# Patient Record
Sex: FEMALE | Race: WHITE | NOT HISPANIC OR LATINO | Employment: OTHER | URBAN - METROPOLITAN AREA
[De-identification: names, ages, dates, MRNs, and addresses within clinical notes are randomized per-mention and may not be internally consistent; named-entity substitution may affect disease eponyms.]

---

## 2022-05-20 LAB — HBA1C MFR BLD HPLC: 6.2 %

## 2022-08-26 ENCOUNTER — OFFICE VISIT (OUTPATIENT)
Dept: INTERNAL MEDICINE CLINIC | Facility: CLINIC | Age: 67
End: 2022-08-26
Payer: MEDICARE

## 2022-08-26 VITALS
TEMPERATURE: 97.6 F | BODY MASS INDEX: 51.7 KG/M2 | RESPIRATION RATE: 18 BRPM | OXYGEN SATURATION: 98 % | WEIGHT: 291.8 LBS | SYSTOLIC BLOOD PRESSURE: 138 MMHG | HEART RATE: 66 BPM | DIASTOLIC BLOOD PRESSURE: 78 MMHG | HEIGHT: 63 IN

## 2022-08-26 DIAGNOSIS — N39.3 STRESS INCONTINENCE: ICD-10-CM

## 2022-08-26 DIAGNOSIS — Z00.00 ENCOUNTER FOR SUBSEQUENT ANNUAL WELLNESS VISIT (AWV) IN MEDICARE PATIENT: Primary | ICD-10-CM

## 2022-08-26 DIAGNOSIS — E11.42 TYPE 2 DIABETES MELLITUS WITH DIABETIC POLYNEUROPATHY, WITH LONG-TERM CURRENT USE OF INSULIN (HCC): ICD-10-CM

## 2022-08-26 DIAGNOSIS — E78.2 MIXED HYPERLIPIDEMIA: ICD-10-CM

## 2022-08-26 DIAGNOSIS — I10 PRIMARY HYPERTENSION: ICD-10-CM

## 2022-08-26 DIAGNOSIS — Z12.31 ENCOUNTER FOR SCREENING MAMMOGRAM FOR BREAST CANCER: ICD-10-CM

## 2022-08-26 DIAGNOSIS — E66.01 OBESITY, MORBID (HCC): ICD-10-CM

## 2022-08-26 DIAGNOSIS — Z79.4 TYPE 2 DIABETES MELLITUS WITH DIABETIC POLYNEUROPATHY, WITH LONG-TERM CURRENT USE OF INSULIN (HCC): ICD-10-CM

## 2022-08-26 DIAGNOSIS — Z11.59 NEED FOR HEPATITIS C SCREENING TEST: ICD-10-CM

## 2022-08-26 PROCEDURE — 99204 OFFICE O/P NEW MOD 45 MIN: CPT | Performed by: FAMILY MEDICINE

## 2022-08-26 PROCEDURE — G0438 PPPS, INITIAL VISIT: HCPCS | Performed by: FAMILY MEDICINE

## 2022-08-26 RX ORDER — GABAPENTIN 600 MG/1
600 TABLET ORAL 3 TIMES DAILY
COMMUNITY

## 2022-08-26 RX ORDER — GLIMEPIRIDE 4 MG/1
4 TABLET ORAL
COMMUNITY
End: 2022-09-23

## 2022-08-26 RX ORDER — DULOXETIN HYDROCHLORIDE 60 MG/1
60 CAPSULE, DELAYED RELEASE ORAL
COMMUNITY

## 2022-08-26 RX ORDER — HYDROCODONE BITARTRATE AND ACETAMINOPHEN 7.5; 325 MG/1; MG/1
1 TABLET ORAL EVERY 6 HOURS PRN
COMMUNITY
End: 2022-08-26

## 2022-08-26 RX ORDER — FEXOFENADINE HCL 180 MG/1
180 TABLET ORAL
COMMUNITY

## 2022-08-26 RX ORDER — HYDROCHLOROTHIAZIDE 25 MG/1
25 TABLET ORAL
COMMUNITY

## 2022-08-26 RX ORDER — FUROSEMIDE 40 MG/1
40 TABLET ORAL DAILY
COMMUNITY

## 2022-08-26 RX ORDER — ATORVASTATIN CALCIUM 40 MG/1
40 TABLET, FILM COATED ORAL DAILY
COMMUNITY

## 2022-08-26 NOTE — PROGRESS NOTES
Assessment and Plan:     Problem List Items Addressed This Visit        Other    Obesity, morbid ([address] )      Other Visit Diagnoses     Encounter for subsequent annual wellness visit (AWV) in Medicare patient    -  Primary    Need for hepatitis C screening test        Relevant Orders    Hepatitis C Antibody ([facility]) (Completed)    Encounter for immunization        Encounter for screening mammogram for breast cancer        Relevant Orders    Mammo screening bilateral w 3d & cad    Type 2 diabetes mellitus with diabetic polyneuropathy, with long-term current use of insulin (HCC)        Relevant Medications    glimepiride (AMARYL) 4 mg tablet    Dulaglutide 1 5 MG/0 5ML SOPN    insulin glargine (TOUJEO MAX) 300 units/mL CONCENTRATED U-300 injection pen (2-unit dial)    Other Relevant Orders    HEMOGLOBIN A1C W/ EAG ESTIMATION (Completed)    Mixed hyperlipidemia        Relevant Medications    atorvastatin (LIPITOR) 40 mg tablet    Primary hypertension        Relevant Medications    furosemide (LASIX) 40 mg tablet    hydrochlorothiazide (HYDRODIURIL) 25 mg tablet    Stress incontinence            BMI Counseling: Body mass index is 51 69 kg/m²  The BMI is above normal  Nutrition recommendations include encouraging healthy choices of fruits and vegetables, reducing intake of saturated and trans fat and reducing intake of cholesterol  Exercise recommendations include exercising 3-5 times per week  Rationale for BMI follow-up plan is due to patient being overweight or obese  Depression Screening and Follow-up Plan: Patient was screened for depression during today's encounter  They screened negative with a PHQ-2 score of 1  Urinary Incontinence Plan of Care: counseling topics discussed: use restroom every 2 hours, limit alcohol, caffeine, spicy foods, and acidic foods and limiting fluid intake 3-4 hours before bed         Preventive health issues were discussed with patient, and age appropriate screening tests were ordered as noted in patient's After Visit Summary  Personalized health advice and appropriate referrals for health education or preventive services given if needed, as noted in patient's After Visit Summary  History of Present Illness:     Patient presents for a Medicare Wellness Visit       Patient Care Team:  Jorje Montague DO as PCP - General (Family Medicine)     Review of Systems:          Problem List:     Patient Active Problem List   Diagnosis    Obesity, morbid [facility])      Past Medical and Surgical History:     Past Medical History:   Diagnosis Date    Cancer [facility])      Past Surgical History:   Procedure Laterality Date    GALLBLADDER SURGERY      HYSTERECTOMY      UMBILICAL HERNIA REPAIR        Family History:     History reviewed  No pertinent family history     Social History:     Social History     Socioeconomic History    Marital status: /Civil Union     Spouse name: None    Number of children: None    Years of education: None    Highest education level: None   Occupational History    None   Tobacco Use    Smoking status: Former Smoker     Types: Cigarettes     Quit date: 2000     Years since quittin 7    Smokeless tobacco: Former User   Vaping Use    Vaping Use: Never used   Substance and Sexual Activity    Alcohol use: None    Drug use: Yes     Types: Marijuana     Comment: every day medical marijuana    Sexual activity: None   Other Topics Concern    None   Social History Narrative    None     Social Determinants of Health     Financial Resource Strain: Not on file   Food Insecurity: Not on file   Transportation Needs: Not on file   Physical Activity: Not on file   Stress: Not on file   Social Connections: Not on file   Intimate Partner Violence: Not on file   Housing Stability: Not on file      Medications and Allergies:     Current Outpatient Medications   Medication Sig Dispense Refill    atorvastatin (LIPITOR) 40 mg tablet Take 40 mg by mouth daily Once a day      DULoxetine (CYMBALTA) 60 mg delayed release capsule Take 60 mg by mouth      fexofenadine (ALLEGRA) 180 MG tablet Take 180 mg by mouth      furosemide (LASIX) 40 mg tablet Take 40 mg by mouth daily      gabapentin (NEURONTIN) 600 MG tablet Take 600 mg by mouth Three times a day      glimepiride (AMARYL) 4 mg tablet Take 4 mg by mouth      hydrochlorothiazide (HYDRODIURIL) 25 mg tablet Take 25 mg by mouth      insulin glargine (TOUJEO MAX) 300 units/mL CONCENTRATED U-300 injection pen (2-unit dial) Inject under the skin      Dulaglutide 1 5 MG/0 5ML SOPN Inject 1 5 mg under the skin Once a week (Patient not taking: Reported on 8/26/2022)       No current facility-administered medications for this visit  Allergies   Allergen Reactions    Penicillins Throat Swelling and Tongue Swelling      Immunizations:     Immunization History   Administered Date(s) Administered    COVID-19 PFIZER VACCINE 0 3 ML IM 04/19/2021, 05/14/2021      Health Maintenance:         Topic Date Due    Breast Cancer Screening: Mammogram  Never done    Colorectal Cancer Screening  Never done    Hepatitis C Screening  Completed         Topic Date Due    Pneumococcal Vaccine: 65+ Years (1 - PCV) Never done    COVID-19 Vaccine (3 - Booster for Pfizer series) 10/14/2021    Influenza Vaccine (1) 09/01/2022      Medicare Screening Tests and Risk Assessments:         Health Risk Assessment:   Patient rates overall health as fair  Patient feels that their physical health rating is slightly better  Patient is satisfied with their life  Eyesight was rated as same  Hearing was rated as slightly worse  Patient feels that their emotional and mental health rating is same  Patients states they are sometimes angry  Patient states they are always unusually tired/fatigued  Pain experienced in the last 7 days has been some  Patient's pain rating has been 8/10   Patient states that she has experienced weight loss or gain in last 6 months  Lost weight    Fall Risk Screening: In the past year, patient has experienced: no history of falling in past year      Urinary Incontinence Screening:   Patient has leaked urine accidently in the last six months  Home Safety:  Patient does not have trouble with stairs inside or outside of their home  Patient has working smoke alarms and has working carbon monoxide detector  Home safety hazards include: none  Nutrition:   Current diet is Unhealthy  Medications:   Patient is not currently taking any over-the-counter supplements  Patient is able to manage medications  Activities of Daily Living (ADLs)/Instrumental Activities of Daily Living (IADLs):   Walk and transfer into and out of bed and chair?: Yes  Dress and groom yourself?: Yes    Bathe or shower yourself?: Yes    Feed yourself? Yes  Do your laundry/housekeeping?: Yes  Manage your money, pay your bills and track your expenses?: Yes  Make your own meals?: Yes    Do your own shopping?: No    Previous Hospitalizations:   Any hospitalizations or ED visits within the last 12 months?: No      Advance Care Planning:   Living will: No      PREVENTIVE SCREENINGS        Colorectal Cancer Screening:     General: Screening Current      Cervical Cancer Screening:    General: Screening Not Indicated      Lung Cancer Screening:     General: Screening Not Indicated    Screening, Brief Intervention, and Referral to Treatment (SBIRT)    Screening  Typical number of drinks in a day: 0  Typical number of drinks in a week: 0  Interpretation: Low risk drinking behavior      Review of Current Opioid Use    Opioid Risk Tool (ORT) Interpretation: Complete Opioid Risk Tool (ORT)    No exam data present     Physical Exam:     /78 (BP Location: Left arm, Patient Position: Sitting, Cuff Size: Large)   Pulse 66   Temp 97 6 °F (36 4 °C) (Temporal)   Resp 18   Ht 5' 3" (1 6 m)   Wt 132 kg (291 lb 12 8 oz)   SpO2 98%   BMI 51 69 kg/m²          Waldo Jordan Lan Ocampo DO

## 2022-08-26 NOTE — PROGRESS NOTES
INITIAL OFFICE VISIT  [facility] - [facility] OF [address] [place] [address]    NAME: Shannon Styles  AGE: 78 y o  SEX: female  : 1955     DATE: 2022     Assessment and Plan:     Problem List Items Addressed This Visit        Other    Obesity, morbid ([address] )      Other Visit Diagnoses     Encounter for subsequent annual wellness visit (AWV) in Medicare patient    -  Primary    Need for hepatitis C screening test        Relevant Orders    Hepatitis C Antibody ([facility]) (Completed)    Encounter for screening mammogram for breast cancer        Relevant Orders    Mammo screening bilateral w 3d & cad    Type 2 diabetes mellitus with diabetic polyneuropathy, with long-term current use of insulin (HCC)        Relevant Medications    glimepiride (AMARYL) 4 mg tablet    Dulaglutide 1 5 MG/0 5ML SOPN    insulin glargine (TOUJEO MAX) 300 units/mL CONCENTRATED U-300 injection pen (2-unit dial)    Other Relevant Orders    HEMOGLOBIN A1C W/ EAG ESTIMATION (Completed)    Mixed hyperlipidemia        Relevant Medications    atorvastatin (LIPITOR) 40 mg tablet    Primary hypertension        Relevant Medications    furosemide (LASIX) 40 mg tablet    hydrochlorothiazide (HYDRODIURIL) 25 mg tablet    Stress incontinence            Refills provided  BP seems well controlled on current antihypertensive regimen  Will obtain updated laboratory studies of titrate additional therapies as needed  Return in about 4 weeks (around 2022) for Recheck  Chief Complaint:     Chief Complaint   Patient presents with    Establish Care     Pt states that she is here to start care and have other concerns      History of Present Illness:     Medical hx for dm2, htn, hld, drepressio h/o of endomedetirial cancer and chronic nerve pain  Has a medical marijuana licmien  usig oral thc  Also take advil, tyneol and gabapening  For diabetes trucliicyt, [address]  and glimpiride   No prandial inusline   Didn't tolrate metformin  Last colon cancer screening- colonoscpy 6 years + poloyps  Last mammo- never had one    Denies hx of MI or CVA   Quite toboacco   etoh once a year   Family hx    Wears glasses  Last visit was two years          The following portions of the patient's history were reviewed and updated as appropriate: allergies, current medications, past family history, past medical history, past social history, past surgical history and problem list      Review of Systems:     Review of Systems   Eyes: Positive for visual disturbance (glasses)  Respiratory: Negative for shortness of breath  Cardiovascular: Negative for chest pain  Gastrointestinal: Negative for constipation and diarrhea  Genitourinary: Positive for frequency  Musculoskeletal: Positive for arthralgias, back pain and gait problem  Neurological: Positive for light-headedness  Negative for headaches          Past Medical History:     Past Medical History:   Diagnosis Date    Cancer [facility])         Past Surgical History:     Past Surgical History:   Procedure Laterality Date    GALLBLADDER SURGERY      HYSTERECTOMY      UMBILICAL HERNIA REPAIR          Social History:     Social History     Socioeconomic History    Marital status: /Civil Union     Spouse name: None    Number of children: None    Years of education: None    Highest education level: None   Occupational History    None   Tobacco Use    Smoking status: Former Smoker     Types: Cigarettes     Quit date: 2000     Years since quittin 7    Smokeless tobacco: Former User   Vaping Use    Vaping Use: Never used   Substance and Sexual Activity    Alcohol use: None    Drug use: Yes     Types: Marijuana     Comment: every day medical marijuana    Sexual activity: None   Other Topics Concern    None   Social History Narrative    None     Social Determinants of Health     Financial Resource Strain: Not on file   Food Insecurity: Not on file   Transportation Needs: Not on file   Physical Activity: Not on file   Stress: Not on file   Social Connections: Not on file   Intimate Partner Violence: Not on file   Housing Stability: Not on file         Family History:   History reviewed  No pertinent family history  Current Medications:     Current Outpatient Medications:     atorvastatin (LIPITOR) 40 mg tablet, Take 40 mg by mouth daily Once a day, Disp: , Rfl:     DULoxetine (CYMBALTA) 60 mg delayed release capsule, Take 60 mg by mouth, Disp: , Rfl:     fexofenadine (ALLEGRA) 180 MG tablet, Take 180 mg by mouth, Disp: , Rfl:     furosemide (LASIX) 40 mg tablet, Take 40 mg by mouth daily, Disp: , Rfl:     gabapentin (NEURONTIN) 600 MG tablet, Take 600 mg by mouth Three times a day, Disp: , Rfl:     glimepiride (AMARYL) 4 mg tablet, Take 4 mg by mouth, Disp: , Rfl:     hydrochlorothiazide (HYDRODIURIL) 25 mg tablet, Take 25 mg by mouth, Disp: , Rfl:     insulin glargine (TOUJEO MAX) 300 units/mL CONCENTRATED U-300 injection pen (2-unit dial), Inject under the skin, Disp: , Rfl:     Dulaglutide 1 5 MG/0 5ML SOPN, Inject 1 5 mg under the skin Once a week (Patient not taking: Reported on 8/26/2022), Disp: , Rfl:      Allergies: Allergies   Allergen Reactions    Penicillins Throat Swelling and Tongue Swelling        Physical Exam:     /78 (BP Location: Left arm, Patient Position: Sitting, Cuff Size: Large)   Pulse 66   Temp 97 6 °F (36 4 °C) (Temporal)   Resp 18   Ht 5' 3" (1 6 m)   Wt 132 kg (291 lb 12 8 oz)   SpO2 98%   BMI 51 69 kg/m²     Physical Exam  Constitutional:       Appearance: She is obese  HENT:      Head: Normocephalic and atraumatic  Right Ear: External ear normal       Left Ear: External ear normal    Eyes:      Extraocular Movements: Extraocular movements intact  Conjunctiva/sclera: Conjunctivae normal       Pupils: Pupils are equal, round, and reactive to light     Cardiovascular:      Rate and Rhythm: Normal rate and regular rhythm  Heart sounds: No murmur heard  Pulmonary:      Effort: Pulmonary effort is normal       Breath sounds: Normal breath sounds  Abdominal:      General: Bowel sounds are normal       Palpations: Abdomen is soft  Musculoskeletal:      Right lower leg: Edema (trace) present  Left lower leg: Edema (trace) present  Neurological:      Mental Status: She is alert and oriented to person, place, and time     Psychiatric:         Mood and Affect: Mood normal          Behavior: Behavior normal               Antonio Carreon,   MEDICAL [address]

## 2022-08-26 NOTE — PATIENT INSTRUCTIONS
Urinary Incontinence   AMBULATORY CARE:   Urinary incontinence (UI)  is when you lose control of your bladder  UI develops because your bladder cannot store or empty urine properly  The 3 most common types of UI are stress incontinence, urge incontinence, or both  Common symptoms include the following:   · You feel like your bladder does not empty completely when you urinate  · You urinate often and need to urinate immediately  · You leak urine when you sleep, or you wake up with the urge to urinate  · You leak urine when you cough, sneeze, exercise, or laugh  Call your doctor if:   · You have severe pain  · You are confused or cannot think clearly  · You have a fever  · You see blood in your urine  · You have pain when you urinate  · You have new or worse pain, even after treatment  · Your mouth feels dry or you have vision changes  · Your urine is cloudy or smells bad  · You have questions or concerns about your condition or care  Medicines:   · Medicines  may be given to help strengthen your bladder control  · Take your medicine as directed  Contact your healthcare provider if you think your medicine is not helping or if you have side effects  Tell him or her if you are allergic to any medicine  Keep a list of the medicines, vitamins, and herbs you take  Include the amounts, and when and why you take them  Bring the list or the pill bottles to follow-up visits  Carry your medicine list with you in case of an emergency  Do pelvic muscle exercises often:  Your pelvic muscles help you stop urinating  Squeeze these muscles tight for 5 seconds, then relax for 5 seconds  Gradually work up to squeezing for 10 seconds  Do 3 sets of 15 repetitions a day, or as directed  This will help strengthen your pelvic muscles and improve bladder control  Train your bladder:  Go to the bathroom at set times, such as every 2 hours, even if you do not feel the urge to go   You can also try to hold your urine when you feel the urge to go  For example, hold your urine for 5 minutes when you feel the urge to go  As that becomes easier, hold your urine for 10 minutes  Self-care:   · Keep a UI record  Write down how often you leak urine and how much you leak  Make a note of what you were doing when you leaked urine  · Drink liquids as directed  Ask your healthcare provider how much liquid to drink each day and which liquids are best for you  You may need to limit the amount of liquid you drink to help control your urine leakage  Do not drink any liquid right before you go to bed  Limit or do not have drinks that contain caffeine or alcohol  · Prevent constipation  Eat a variety of high-fiber foods  Good examples are high-fiber cereals, beans, vegetables, and whole-grain breads  Prune juice may help make your bowel movement softer  Walking is the best way to trigger your intestines to have a bowel movement  · Exercise regularly and maintain a healthy weight  Ask your healthcare provider how much you should weigh and about the best exercise plan for you  Weight loss and exercise will decrease pressure on your bladder and help you control your leakage  Ask him or her to help you create a weight loss plan if you are overweight  · Use a catheter as directed  to help empty your bladder  A catheter is a tiny, plastic tube that is put into your bladder to drain your urine  Your healthcare provider may tell you to use a catheter to prevent your bladder from getting too full and leaking urine  · Go to behavior therapy as directed  Behavior therapy may be used to help you learn to control your urge to urinate  Follow up with your doctor as directed:  Write down your questions so you remember to ask them during your visits  © Copyright [employer] 2022 Information is for End User's use only and may not be sold, redistributed or otherwise used for commercial purposes   All illustrations and images included in CareNotes® are the copyrighted property of A D A M , Inc  or [employer] Garcia  The above information is an  only  It is not intended as medical advice for individual conditions or treatments  Talk to your doctor, nurse or pharmacist before following any medical regimen to see if it is safe and effective for you

## 2022-08-27 ENCOUNTER — APPOINTMENT (OUTPATIENT)
Dept: LAB | Facility: HOSPITAL | Age: 67
End: 2022-08-27
Payer: MEDICARE

## 2022-08-27 DIAGNOSIS — E11.42 TYPE 2 DIABETES MELLITUS WITH DIABETIC POLYNEUROPATHY, WITH LONG-TERM CURRENT USE OF INSULIN (HCC): ICD-10-CM

## 2022-08-27 DIAGNOSIS — Z79.4 TYPE 2 DIABETES MELLITUS WITH DIABETIC POLYNEUROPATHY, WITH LONG-TERM CURRENT USE OF INSULIN (HCC): ICD-10-CM

## 2022-08-27 DIAGNOSIS — Z11.59 NEED FOR HEPATITIS C SCREENING TEST: ICD-10-CM

## 2022-08-27 PROCEDURE — 86803 HEPATITIS C AB TEST: CPT

## 2022-08-27 PROCEDURE — 83036 HEMOGLOBIN GLYCOSYLATED A1C: CPT

## 2022-08-27 PROCEDURE — 36415 COLL VENOUS BLD VENIPUNCTURE: CPT

## 2022-08-28 LAB
EST. AVERAGE GLUCOSE BLD GHB EST-MCNC: 137 MG/DL
HBA1C MFR BLD: 6.4 %
HCV AB SER QL: NORMAL

## 2022-09-01 ENCOUNTER — TELEPHONE (OUTPATIENT)
Dept: INTERNAL MEDICINE CLINIC | Facility: CLINIC | Age: 67
End: 2022-09-01

## 2022-09-01 NOTE — TELEPHONE ENCOUNTER
Patient has been notified about her labs and A1C       Patient stated that she is also using her Free Style Mark 2 to monitor her sugars and it has helped her a lot and she will see you in a 4 weeks for a F/U

## 2022-09-01 NOTE — TELEPHONE ENCOUNTER
----- Message from Papa Lester DO sent at 9/1/2022  8:21 AM EDT -----  A1c is 6 4 this is in the goal range  Diabetes is well controlled on current medication

## 2022-09-16 ENCOUNTER — RA CDI HCC (OUTPATIENT)
Dept: OTHER | Facility: HOSPITAL | Age: 67
End: 2022-09-16

## 2022-09-16 NOTE — PROGRESS NOTES
[address]  coding opportunities       Chart reviewed, no opportunity found: CHART REVIEWED, NO OPPORTUNITY FOUND        Patients Insurance     Medicare Insurance: Medicare

## 2022-09-23 ENCOUNTER — OFFICE VISIT (OUTPATIENT)
Dept: INTERNAL MEDICINE CLINIC | Facility: CLINIC | Age: 67
End: 2022-09-23
Payer: MEDICARE

## 2022-09-23 VITALS
RESPIRATION RATE: 16 BRPM | WEIGHT: 292.4 LBS | TEMPERATURE: 98.1 F | BODY MASS INDEX: 51.81 KG/M2 | DIASTOLIC BLOOD PRESSURE: 76 MMHG | SYSTOLIC BLOOD PRESSURE: 142 MMHG | OXYGEN SATURATION: 98 % | HEIGHT: 63 IN | HEART RATE: 77 BPM

## 2022-09-23 DIAGNOSIS — Z79.4 TYPE 2 DIABETES MELLITUS WITH DIABETIC POLYNEUROPATHY, WITH LONG-TERM CURRENT USE OF INSULIN (HCC): Primary | ICD-10-CM

## 2022-09-23 DIAGNOSIS — Z23 ENCOUNTER FOR IMMUNIZATION: ICD-10-CM

## 2022-09-23 DIAGNOSIS — E78.2 MIXED HYPERLIPIDEMIA: ICD-10-CM

## 2022-09-23 DIAGNOSIS — Z78.0 POST-MENOPAUSAL: ICD-10-CM

## 2022-09-23 DIAGNOSIS — E11.42 TYPE 2 DIABETES MELLITUS WITH DIABETIC POLYNEUROPATHY, WITH LONG-TERM CURRENT USE OF INSULIN (HCC): Primary | ICD-10-CM

## 2022-09-23 DIAGNOSIS — I10 PRIMARY HYPERTENSION: ICD-10-CM

## 2022-09-23 PROCEDURE — G0008 ADMIN INFLUENZA VIRUS VAC: HCPCS | Performed by: FAMILY MEDICINE

## 2022-09-23 PROCEDURE — 90662 IIV NO PRSV INCREASED AG IM: CPT | Performed by: FAMILY MEDICINE

## 2022-09-23 PROCEDURE — 99214 OFFICE O/P EST MOD 30 MIN: CPT | Performed by: FAMILY MEDICINE

## 2022-09-23 NOTE — PROGRESS NOTES
FOLLOW-UP OFFICE VISIT  [facility]    NAME: Silvana Runner  AGE: 78 y o  SEX: female  : 1955     DATE: 2022     Assessment and Plan:     Problem List Items Addressed This Visit        Endocrine    Type 2 diabetes mellitus with diabetic polyneuropathy, with long-term current use of insulin ([address] ) - Primary    Relevant Medications    Empagliflozin (Jardiance) 10 MG TABS tablet       Cardiovascular and Mediastinum    Primary hypertension       Other    Mixed hyperlipidemia      Other Visit Diagnoses     Encounter for immunization        Relevant Orders    influenza vaccine, high-dose, PF 0 7 mL (FLUZONE HIGH-DOSE) (Completed)    Post-menopausal        Relevant Orders    DXA bone density spine hip and pelvis        A1C of 6  4  will decrease insulin to 70 units nightly  Add SGLT2  D/c sulfonyurea  Continued statin  BP well controlled  Screening studies ordered         Return in about 4 weeks (around 10/21/2022) for Recheck  Chief Complaint:     Chief Complaint   Patient presents with    Follow-up     4 week         History of Present Illness:     Presents for f/u  Discussed a1c and blood sugars  Review of Systems:     Review of Systems   Respiratory: Negative for shortness of breath  Cardiovascular: Negative for chest pain and leg swelling  Musculoskeletal: Negative for gait problem  Problem List:     Patient Active Problem List   Diagnosis    Obesity, morbid ([address] )    Mixed hyperlipidemia    Primary hypertension    Type 2 diabetes mellitus with diabetic polyneuropathy, with long-term current use of insulin (HCC)        Objective:     /76 (BP Location: Right arm, Patient Position: Sitting, Cuff Size: Large)   Pulse 77   Temp 98 1 °F (36 7 °C) (Tympanic)   Resp 16   Ht 5' 3" (1 6 m)   Wt 133 kg (292 lb 6 4 oz)   SpO2 98%   BMI 51 80 kg/m²     Physical Exam  HENT:      Head: Normocephalic     Eyes: Conjunctiva/sclera: Conjunctivae normal    Cardiovascular:      Rate and Rhythm: Normal rate and regular rhythm  Heart sounds: No murmur heard  Pulmonary:      Effort: Pulmonary effort is normal       Breath sounds: Normal breath sounds  Abdominal:      General: Bowel sounds are normal       Palpations: Abdomen is soft  Neurological:      Mental Status: She is alert  Pertinent Laboratory/Diagnostic Studies:    Laboratory Results: I have personally reviewed the pertinent laboratory results/reports     Chemistry Profile: No results for input(s): NA, K, CL, CO2, ANIONGAP, BUN, CREATININE, GLUF, GLUC, GLUCOSE, CALCIUM, CORRECTEDCA, MG, PHOS, AST, ALT, ALKPHOS, PROT, BILITOT, EGFR, GFRAA, GFRNONAA, EGFRAA, EGFRNAA, EGFRNONAFA in the last 8784 hours  Invalid input(s): EXTSODIUM, EXTPOTASSIUM, EXTCO2, EXTANIONGAP, EXTBUN, EXTCREAT, EXTGLUBLD, GLU, SLAMBGLUCOSE, EXTCALCIUM, CAADJUST, ALBUMIN, SERUMALBUMIN, EXTEGFR  Endocrine Studies:   Results from Last 12 Months   Lab Units 08/27/22  1117   HEMOGLOBIN A1C % 6 4*       Radiology/Other Diagnostic Testing Results: I have personally reviewed pertinent reports          Tr Lopez [facility]  9/27/2022 2:04 PM

## 2022-09-23 NOTE — PATIENT INSTRUCTIONS
Decrease insulin to 70 units  Start taking Jardiance 10 mg daily     Stop taking gliperimide    Schedule your bone scan and you mammogram

## 2022-10-13 ENCOUNTER — TELEPHONE (OUTPATIENT)
Dept: INTERNAL MEDICINE CLINIC | Facility: CLINIC | Age: 67
End: 2022-10-13

## 2022-10-13 DIAGNOSIS — E11.42 TYPE 2 DIABETES MELLITUS WITH DIABETIC POLYNEUROPATHY, WITH LONG-TERM CURRENT USE OF INSULIN (HCC): ICD-10-CM

## 2022-10-13 DIAGNOSIS — Z79.4 TYPE 2 DIABETES MELLITUS WITH DIABETIC POLYNEUROPATHY, WITH LONG-TERM CURRENT USE OF INSULIN (HCC): ICD-10-CM

## 2022-10-13 RX ORDER — EMPAGLIFLOZIN 10 MG/1
TABLET, FILM COATED ORAL
Qty: 30 TABLET | Refills: 11 | Status: SHIPPED | OUTPATIENT
Start: 2022-10-13 | End: 2022-10-18

## 2022-10-13 NOTE — TELEPHONE ENCOUNTER
Hold the jardiance for the next 3 days  If symptoms improve then it probably was from the medicaiton and I can switch it to something else

## 2022-10-13 NOTE — TELEPHONE ENCOUNTER
Taking Jardiance for 2 wks- lost 15lbs  Could she be sick from medication?   Didn't take it today  Vomiting, diahrrea, and not eating- can't keep anything down

## 2022-10-17 ENCOUNTER — TELEPHONE (OUTPATIENT)
Dept: INTERNAL MEDICINE CLINIC | Facility: CLINIC | Age: 67
End: 2022-10-17

## 2022-10-17 NOTE — TELEPHONE ENCOUNTER
Feeling better after stopping Jardiance for 3 days    Can Dr Anupam Gibbs order something different for her to try?

## 2022-10-18 DIAGNOSIS — Z79.4 TYPE 2 DIABETES MELLITUS WITH DIABETIC POLYNEUROPATHY, WITH LONG-TERM CURRENT USE OF INSULIN (HCC): Primary | ICD-10-CM

## 2022-10-18 DIAGNOSIS — E11.42 TYPE 2 DIABETES MELLITUS WITH DIABETIC POLYNEUROPATHY, WITH LONG-TERM CURRENT USE OF INSULIN (HCC): Primary | ICD-10-CM

## 2022-10-24 ENCOUNTER — TELEPHONE (OUTPATIENT)
Dept: OTHER | Facility: OTHER | Age: 67
End: 2022-10-24

## 2022-10-24 DIAGNOSIS — E11.42 TYPE 2 DIABETES MELLITUS WITH DIABETIC POLYNEUROPATHY, WITH LONG-TERM CURRENT USE OF INSULIN (HCC): Primary | ICD-10-CM

## 2022-10-24 DIAGNOSIS — Z79.4 TYPE 2 DIABETES MELLITUS WITH DIABETIC POLYNEUROPATHY, WITH LONG-TERM CURRENT USE OF INSULIN (HCC): Primary | ICD-10-CM

## 2022-10-24 NOTE — PROGRESS NOTES
Please notify pt that rybelsus wasn't covered by insurance  alterative antihyperglycemic farxiga has been sent to pharmacy    This is a once a day tablet for blood sugar

## 2022-10-24 NOTE — TELEPHONE ENCOUNTER
Contacted [facility] who states that patient's insurance does not cover Rybelsus at this time  Can someone contact patient in regards to this? Patient see's Dr Alicia Childers and this prescription was sent in on 10/18, as per [facility] they contacted the Dr's office to state that the Rybelsus was not covered by pt's insurance and an alternative needed to be ordered

## 2022-10-28 ENCOUNTER — OFFICE VISIT (OUTPATIENT)
Dept: INTERNAL MEDICINE CLINIC | Facility: CLINIC | Age: 67
End: 2022-10-28

## 2022-10-28 VITALS
TEMPERATURE: 97.2 F | OXYGEN SATURATION: 96 % | BODY MASS INDEX: 50.14 KG/M2 | RESPIRATION RATE: 18 BRPM | HEART RATE: 5 BPM | WEIGHT: 283 LBS | HEIGHT: 63 IN | DIASTOLIC BLOOD PRESSURE: 76 MMHG | SYSTOLIC BLOOD PRESSURE: 124 MMHG

## 2022-10-28 DIAGNOSIS — Z79.4 TYPE 2 DIABETES MELLITUS WITH DIABETIC POLYNEUROPATHY, WITH LONG-TERM CURRENT USE OF INSULIN (HCC): Primary | ICD-10-CM

## 2022-10-28 DIAGNOSIS — E78.2 MIXED HYPERLIPIDEMIA: ICD-10-CM

## 2022-10-28 DIAGNOSIS — I10 PRIMARY HYPERTENSION: ICD-10-CM

## 2022-10-28 DIAGNOSIS — E11.42 TYPE 2 DIABETES MELLITUS WITH DIABETIC POLYNEUROPATHY, WITH LONG-TERM CURRENT USE OF INSULIN (HCC): Primary | ICD-10-CM

## 2022-10-28 LAB
LEFT EYE DIABETIC RETINOPATHY: NORMAL
LEFT EYE IMAGE QUALITY: NORMAL
LEFT EYE MACULAR EDEMA: NORMAL
LEFT EYE OTHER RETINOPATHY: NORMAL
RIGHT EYE DIABETIC RETINOPATHY: NORMAL
RIGHT EYE IMAGE QUALITY: NORMAL
RIGHT EYE MACULAR EDEMA: NORMAL
RIGHT EYE OTHER RETINOPATHY: NORMAL
SEVERITY (EYE EXAM): NORMAL

## 2022-10-28 RX ORDER — DULAGLUTIDE 3 MG/.5ML
3 INJECTION, SOLUTION SUBCUTANEOUS WEEKLY
Qty: 6 ML | Refills: 0 | Status: SHIPPED | OUTPATIENT
Start: 2022-10-28 | End: 2023-01-26

## 2022-10-28 RX ORDER — DULAGLUTIDE 0.75 MG/.5ML
INJECTION, SOLUTION SUBCUTANEOUS
COMMUNITY
Start: 2022-10-21 | End: 2022-10-28

## 2022-10-28 NOTE — PROGRESS NOTES
FOLLOW-UP OFFICE VISIT  [facility] [address] [place] [address]    NAME: Renato Porter  AGE: 78 y o  SEX: female  : 1955     DATE: 10/28/2022     Assessment and Plan:     1  Type 2 diabetes mellitus with diabetic polyneuropathy, with long-term current use of insulin (HCC)  a m  fasting glucose within range  Overall also decrease insulin use  Patient encouraged to start [address]  Decrease Toujeo 65 units nightly  Will increase Trulicity to 3 mg Q weekly  Continues to have difficulty changing dietary choices that are beneficial for the management of this issue   - IRIS Diabetic eye exam  - Microalbumin / creatinine urine ratio ([facility]); Future  - HEMOGLOBIN A1C W/ EAG ESTIMATION; Future  - Dulaglutide (Trulicity) 3 UE/5 3PV SOPN; Inject 0 5 mL (3 mg total) under the skin once a week  Dispense: 6 mL; Refill: 0    2  Mixed hyperlipidemia-Continue atorvastatin 49mg qd  3  Primary hypertension  HTN controlled  Continue hctz 25mg qd  Return in about 3 months (around 2023) for Next scheduled follow up  Chief Complaint:     Chief Complaint   Patient presents with   • Follow-up     4 weeks        History of Present Illness:     Presents for diabetes follow-up  Currently taking 1 5 Q weekly Trulicity and 70 units of Toujeo  Has not yet received the [address]  Didn't tolerate the Jardiance  A m  fasting glucose between 85 and mid 120s  Taking statin without issue  Review of Systems:     Review of Systems   Constitutional: Negative for fever  Respiratory: Negative for shortness of breath  Cardiovascular: Negative for chest pain and leg swelling  Gastrointestinal: Positive for nausea and vomiting          Issue has resolved since stopping Jardiance        Problem List:     Patient Active Problem List   Diagnosis   • Obesity, morbid ([address] )   • Mixed hyperlipidemia   • Primary hypertension   • Type 2 diabetes mellitus with diabetic polyneuropathy, with long-term current use of insulin (HCC)        Objective:     /76 (BP Location: Left arm, Patient Position: Sitting, Cuff Size: Large)   Pulse (!) 5   Temp (!) 97 2 °F (36 2 °C) (Temporal)   Resp 18   Ht 5' 3" (1 6 m)   Wt 128 kg (283 lb) Comment: WITH SHOES ON  SpO2 96%   BMI 50 13 kg/m²     Physical Exam  Constitutional:       Appearance: She is obese  HENT:      Head: Normocephalic  Eyes:      Conjunctiva/sclera: Conjunctivae normal    Cardiovascular:      Rate and Rhythm: Normal rate and regular rhythm  Heart sounds: No murmur heard  Pulmonary:      Effort: Pulmonary effort is normal       Breath sounds: Normal breath sounds  Abdominal:      General: Bowel sounds are normal       Palpations: Abdomen is soft  Neurological:      Mental Status: She is alert           Pertinent Laboratory/Diagnostic Studies:    Laboratory Results: I have personally reviewed the pertinent laboratory results/reports           Lucina DUTTON: [facility]  10/28/2022 10:24 AM

## 2022-10-31 ENCOUNTER — TELEPHONE (OUTPATIENT)
Dept: INTERNAL MEDICINE CLINIC | Facility: CLINIC | Age: 67
End: 2022-10-31

## 2022-10-31 NOTE — TELEPHONE ENCOUNTER
AttnDoreene Lenka Pt was seen 10/28 dropped off paperwork to be faxed to [facility]  Pt can't get it through the pharmacy [facility] only  This needs to be done by Tues   11/1/2022  Please call pt back [phone]

## 2022-11-02 ENCOUNTER — TELEPHONE (OUTPATIENT)
Dept: INTERNAL MEDICINE CLINIC | Facility: CLINIC | Age: 67
End: 2022-11-02

## 2022-11-11 ENCOUNTER — HOSPITAL ENCOUNTER (OUTPATIENT)
Dept: BONE DENSITY | Facility: CLINIC | Age: 67
Discharge: HOME | End: 2022-11-11

## 2022-11-11 VITALS — WEIGHT: 283 LBS | HEIGHT: 62 IN | BODY MASS INDEX: 52.08 KG/M2

## 2022-11-11 DIAGNOSIS — Z78.0 POST-MENOPAUSAL: ICD-10-CM

## 2022-11-14 ENCOUNTER — TELEPHONE (OUTPATIENT)
Dept: INTERNAL MEDICINE CLINIC | Facility: CLINIC | Age: 67
End: 2022-11-14

## 2022-11-16 DIAGNOSIS — R35.0 URINARY FREQUENCY: Primary | ICD-10-CM

## 2022-11-18 ENCOUNTER — APPOINTMENT (OUTPATIENT)
Dept: LAB | Facility: CLINIC | Age: 67
End: 2022-11-18

## 2022-11-18 DIAGNOSIS — Z79.4 TYPE 2 DIABETES MELLITUS WITH DIABETIC POLYNEUROPATHY, WITH LONG-TERM CURRENT USE OF INSULIN (HCC): ICD-10-CM

## 2022-11-18 DIAGNOSIS — R35.0 URINARY FREQUENCY: ICD-10-CM

## 2022-11-18 DIAGNOSIS — E11.42 TYPE 2 DIABETES MELLITUS WITH DIABETIC POLYNEUROPATHY, WITH LONG-TERM CURRENT USE OF INSULIN (HCC): ICD-10-CM

## 2022-11-19 LAB
EST. AVERAGE GLUCOSE BLD GHB EST-MCNC: 137 MG/DL
HBA1C MFR BLD: 6.4 %

## 2022-11-21 ENCOUNTER — APPOINTMENT (OUTPATIENT)
Dept: LAB | Facility: CLINIC | Age: 67
End: 2022-11-21

## 2022-11-21 ENCOUNTER — TELEPHONE (OUTPATIENT)
Dept: INTERNAL MEDICINE CLINIC | Facility: CLINIC | Age: 67
End: 2022-11-21

## 2022-11-21 DIAGNOSIS — Z79.4 TYPE 2 DIABETES MELLITUS WITH DIABETIC POLYNEUROPATHY, WITH LONG-TERM CURRENT USE OF INSULIN (HCC): Primary | ICD-10-CM

## 2022-11-21 DIAGNOSIS — E11.42 TYPE 2 DIABETES MELLITUS WITH DIABETIC POLYNEUROPATHY, WITH LONG-TERM CURRENT USE OF INSULIN (HCC): Primary | ICD-10-CM

## 2022-11-21 LAB
BACTERIA UR QL AUTO: ABNORMAL /HPF
BILIRUB UR QL STRIP: NEGATIVE
CLARITY UR: ABNORMAL
COLOR UR: YELLOW
GLUCOSE UR STRIP-MCNC: NEGATIVE MG/DL
HGB UR QL STRIP.AUTO: NEGATIVE
KETONES UR STRIP-MCNC: NEGATIVE MG/DL
LEUKOCYTE ESTERASE UR QL STRIP: NEGATIVE
MUCOUS THREADS UR QL AUTO: ABNORMAL
NITRITE UR QL STRIP: NEGATIVE
NON-SQ EPI CELLS URNS QL MICRO: ABNORMAL /HPF
PH UR STRIP.AUTO: 6 [PH]
PROT UR STRIP-MCNC: ABNORMAL MG/DL
RBC #/AREA URNS AUTO: ABNORMAL /HPF
SP GR UR STRIP.AUTO: 1.02 (ref 1–1.03)
UROBILINOGEN UR STRIP-ACNC: <2 MG/DL
WBC #/AREA URNS AUTO: ABNORMAL /HPF

## 2022-11-22 ENCOUNTER — TELEPHONE (OUTPATIENT)
Dept: INTERNAL MEDICINE CLINIC | Facility: CLINIC | Age: 67
End: 2022-11-22

## 2022-11-22 LAB
CREAT UR-MCNC: 238 MG/DL
MICROALBUMIN UR-MCNC: 24.9 MG/L (ref 0–20)
MICROALBUMIN/CREAT 24H UR: 10 MG/G CREATININE (ref 0–30)

## 2022-11-22 RX ORDER — GLIPIZIDE 5 MG/1
5 TABLET, FILM COATED, EXTENDED RELEASE ORAL DAILY
Qty: 30 TABLET | Refills: 2 | Status: SHIPPED | OUTPATIENT
Start: 2022-11-22

## 2022-11-22 NOTE — TELEPHONE ENCOUNTER
----- Message from Tavo Green DO sent at 11/22/2022  9:44 AM EST -----  The urine doesn't have bacteria in it to suggest infection  However it is very concentrated  She is to increase her water intake and take over the counter AZO for discomfort

## 2023-01-01 ENCOUNTER — HOSPITAL ENCOUNTER (EMERGENCY)
Facility: HOSPITAL | Age: 68
Discharge: HOME | End: 2023-01-02
Attending: EMERGENCY MEDICINE

## 2023-01-01 VITALS
OXYGEN SATURATION: 99 % | TEMPERATURE: 99.7 F | SYSTOLIC BLOOD PRESSURE: 134 MMHG | DIASTOLIC BLOOD PRESSURE: 75 MMHG | HEART RATE: 72 BPM | RESPIRATION RATE: 16 BRPM

## 2023-01-01 DIAGNOSIS — E87.6 HYPOKALEMIA: ICD-10-CM

## 2023-01-01 DIAGNOSIS — R11.2 NAUSEA & VOMITING: ICD-10-CM

## 2023-01-01 DIAGNOSIS — J11.1 INFLUENZA: Primary | ICD-10-CM

## 2023-01-01 LAB
ALBUMIN SERPL BCP-MCNC: 3.6 G/DL (ref 3.5–5)
ALP SERPL-CCNC: 86 U/L (ref 46–116)
ALT SERPL W P-5'-P-CCNC: 24 U/L (ref 12–78)
ANION GAP SERPL CALCULATED.3IONS-SCNC: 10 MMOL/L (ref 4–13)
AST SERPL W P-5'-P-CCNC: 23 U/L (ref 5–45)
BASOPHILS # BLD AUTO: 0.04 THOUSANDS/ÂΜL (ref 0–0.1)
BASOPHILS NFR BLD AUTO: 1 % (ref 0–1)
BILIRUB SERPL-MCNC: 0.57 MG/DL (ref 0.2–1)
BUN SERPL-MCNC: 15 MG/DL (ref 5–25)
CALCIUM SERPL-MCNC: 9.3 MG/DL (ref 8.3–10.1)
CHLORIDE SERPL-SCNC: 100 MMOL/L (ref 96–108)
CO2 SERPL-SCNC: 30 MMOL/L (ref 21–32)
CREAT SERPL-MCNC: 0.79 MG/DL (ref 0.6–1.3)
EOSINOPHIL # BLD AUTO: 0.05 THOUSAND/ÂΜL (ref 0–0.61)
EOSINOPHIL NFR BLD AUTO: 1 % (ref 0–6)
ERYTHROCYTE [DISTWIDTH] IN BLOOD BY AUTOMATED COUNT: 14.7 % (ref 11.6–15.1)
FLUAV RNA RESP QL NAA+PROBE: POSITIVE
FLUBV RNA RESP QL NAA+PROBE: NEGATIVE
GFR SERPL CREATININE-BSD FRML MDRD: 78 ML/MIN/1.73SQ M
GLUCOSE SERPL-MCNC: 140 MG/DL (ref 65–140)
HCT VFR BLD AUTO: 45 % (ref 34.8–46.1)
HGB BLD-MCNC: 14.3 G/DL (ref 11.5–15.4)
IMM GRANULOCYTES # BLD AUTO: 0.04 THOUSAND/UL (ref 0–0.2)
IMM GRANULOCYTES NFR BLD AUTO: 1 % (ref 0–2)
LIPASE SERPL-CCNC: 72 U/L (ref 73–393)
LYMPHOCYTES # BLD AUTO: 0.77 THOUSANDS/ÂΜL (ref 0.6–4.47)
LYMPHOCYTES NFR BLD AUTO: 11 % (ref 14–44)
MCH RBC QN AUTO: 27.1 PG (ref 26.8–34.3)
MCHC RBC AUTO-ENTMCNC: 31.8 G/DL (ref 31.4–37.4)
MCV RBC AUTO: 85 FL (ref 82–98)
MONOCYTES # BLD AUTO: 1.12 THOUSAND/ÂΜL (ref 0.17–1.22)
MONOCYTES NFR BLD AUTO: 17 % (ref 4–12)
NEUTROPHILS # BLD AUTO: 4.78 THOUSANDS/ÂΜL (ref 1.85–7.62)
NEUTS SEG NFR BLD AUTO: 69 % (ref 43–75)
NRBC BLD AUTO-RTO: 0 /100 WBCS
PLATELET # BLD AUTO: 261 THOUSANDS/UL (ref 149–390)
PMV BLD AUTO: 9.7 FL (ref 8.9–12.7)
POTASSIUM SERPL-SCNC: 2.7 MMOL/L (ref 3.5–5.3)
PROT SERPL-MCNC: 8 G/DL (ref 6.4–8.4)
RBC # BLD AUTO: 5.28 MILLION/UL (ref 3.81–5.12)
RSV RNA RESP QL NAA+PROBE: NEGATIVE
SARS-COV-2 RNA RESP QL NAA+PROBE: NEGATIVE
SODIUM SERPL-SCNC: 140 MMOL/L (ref 135–147)
WBC # BLD AUTO: 6.8 THOUSAND/UL (ref 4.31–10.16)

## 2023-01-01 RX ORDER — ONDANSETRON 2 MG/ML
4 INJECTION INTRAMUSCULAR; INTRAVENOUS ONCE
Status: COMPLETED | OUTPATIENT
Start: 2023-01-01 | End: 2023-01-01

## 2023-01-01 RX ORDER — POTASSIUM CHLORIDE 20 MEQ/1
40 TABLET, EXTENDED RELEASE ORAL ONCE
Status: COMPLETED | OUTPATIENT
Start: 2023-01-02 | End: 2023-01-02

## 2023-01-01 RX ORDER — POTASSIUM CHLORIDE 14.9 MG/ML
20 INJECTION INTRAVENOUS ONCE
Status: DISCONTINUED | OUTPATIENT
Start: 2023-01-02 | End: 2023-01-02 | Stop reason: HOSPADM

## 2023-01-01 RX ADMIN — ONDANSETRON 4 MG: 2 INJECTION INTRAMUSCULAR; INTRAVENOUS at 22:59

## 2023-01-01 RX ADMIN — SODIUM CHLORIDE 1000 ML: 0.9 INJECTION, SOLUTION INTRAVENOUS at 22:54

## 2023-01-02 RX ORDER — ONDANSETRON 4 MG/1
4 TABLET, ORALLY DISINTEGRATING ORAL EVERY 6 HOURS PRN
Qty: 20 TABLET | Refills: 0 | Status: SHIPPED | OUTPATIENT
Start: 2023-01-02

## 2023-01-02 RX ADMIN — POTASSIUM CHLORIDE 40 MEQ: 1500 TABLET, EXTENDED RELEASE ORAL at 00:51

## 2023-01-02 NOTE — ED NOTES
Patient requesting if possible not to take IV potassium due to how long medication will take and patient does not wish to stay all night  Patient requesting if able to take more PO potassium instead   RN advised patient will inform provider and to see how PO meds are tolerated first  Provider stated that if pt able to tolerate PO potassium and no vomiting after zofran, pt will be able to be discharged home with medications     Refugia Pod, RN  01/02/23 9466

## 2023-01-03 ENCOUNTER — PATIENT MESSAGE (OUTPATIENT)
Dept: INTERNAL MEDICINE CLINIC | Facility: CLINIC | Age: 68
End: 2023-01-03

## 2023-01-03 DIAGNOSIS — E87.6 HYPOKALEMIA: Primary | ICD-10-CM

## 2023-01-05 ENCOUNTER — PATIENT MESSAGE (OUTPATIENT)
Dept: INTERNAL MEDICINE CLINIC | Facility: CLINIC | Age: 68
End: 2023-01-05

## 2023-01-05 ENCOUNTER — APPOINTMENT (OUTPATIENT)
Dept: LAB | Facility: CLINIC | Age: 68
End: 2023-01-05

## 2023-01-05 DIAGNOSIS — E87.6 HYPOKALEMIA: ICD-10-CM

## 2023-01-05 DIAGNOSIS — J10.1 INFLUENZA A: Primary | ICD-10-CM

## 2023-01-05 LAB
ANION GAP SERPL CALCULATED.3IONS-SCNC: 6 MMOL/L (ref 4–13)
BUN SERPL-MCNC: 10 MG/DL (ref 5–25)
CALCIUM SERPL-MCNC: 9 MG/DL (ref 8.3–10.1)
CHLORIDE SERPL-SCNC: 97 MMOL/L (ref 96–108)
CO2 SERPL-SCNC: 34 MMOL/L (ref 21–32)
CREAT SERPL-MCNC: 0.74 MG/DL (ref 0.6–1.3)
GFR SERPL CREATININE-BSD FRML MDRD: 84 ML/MIN/1.73SQ M
GLUCOSE P FAST SERPL-MCNC: 135 MG/DL (ref 65–99)
POTASSIUM SERPL-SCNC: 3.3 MMOL/L (ref 3.5–5.3)
SODIUM SERPL-SCNC: 137 MMOL/L (ref 135–147)

## 2023-01-05 RX ORDER — DEXTROMETHORPHAN HYDROBROMIDE AND PROMETHAZINE HYDROCHLORIDE 15; 6.25 MG/5ML; MG/5ML
5 SOLUTION ORAL 4 TIMES DAILY PRN
Qty: 240 ML | Refills: 0 | Status: SHIPPED | OUTPATIENT
Start: 2023-01-05 | End: 2023-02-04

## 2023-01-06 ENCOUNTER — PATIENT MESSAGE (OUTPATIENT)
Dept: INTERNAL MEDICINE CLINIC | Facility: CLINIC | Age: 68
End: 2023-01-06

## 2023-01-06 ENCOUNTER — TELEPHONE (OUTPATIENT)
Dept: INTERNAL MEDICINE CLINIC | Facility: CLINIC | Age: 68
End: 2023-01-06

## 2023-01-06 DIAGNOSIS — E87.6 LOW SERUM POTASSIUM: ICD-10-CM

## 2023-01-06 DIAGNOSIS — E87.6 LOW SERUM POTASSIUM: Primary | ICD-10-CM

## 2023-01-06 RX ORDER — POTASSIUM CHLORIDE 20 MEQ/1
20 TABLET, EXTENDED RELEASE ORAL DAILY
Qty: 60 TABLET | Refills: 0 | Status: SHIPPED | OUTPATIENT
Start: 2023-01-06 | End: 2023-01-09 | Stop reason: SDUPTHER

## 2023-01-06 RX ORDER — POTASSIUM CHLORIDE 20 MEQ/1
20 TABLET, EXTENDED RELEASE ORAL DAILY
Qty: 90 TABLET | Refills: 1 | Status: SHIPPED | OUTPATIENT
Start: 2023-01-06 | End: 2023-01-06 | Stop reason: SDUPTHER

## 2023-01-06 NOTE — TELEPHONE ENCOUNTER
----- Message from Lona Cruz DO sent at 1/6/2023  1:17 PM EST -----  Potassium is still low she should start a potassium supplement   I will sent to pharmacy today

## 2023-01-06 NOTE — TELEPHONE ENCOUNTER
As per pt consent we are able to leave a voicemail with results and providers message if further question she can call us back

## 2023-01-06 NOTE — TELEPHONE ENCOUNTER
----- Message from Felicia Weaver sent at 1/6/2023  3:08 PM EST -----  Regarding: Potassium pill  Contact: [phone]  Was sent to [facility]  it will take more than a week to receive this   can you cancel that and order [facility] again please

## 2023-01-09 DIAGNOSIS — E87.6 LOW SERUM POTASSIUM: ICD-10-CM

## 2023-01-09 RX ORDER — POTASSIUM CHLORIDE 20 MEQ/1
20 TABLET, EXTENDED RELEASE ORAL DAILY
Qty: 60 TABLET | Refills: 0 | Status: SHIPPED | OUTPATIENT
Start: 2023-01-09

## 2023-01-09 NOTE — ED PROVIDER NOTES
History  Chief Complaint   Patient presents with   • Vomiting     Pt c/o vomiting x 1 week, "I can't keep anything down, not even water" as well as several episodes of diarrhea  Pt states loss of appetite for 3 weeks now     60-year-old female presenting to the emergency department for evaluation of nausea vomiting  Patient had 3 weeks of poor appetite which has been worsening over the past 4 days, it has worsened to the point where she is even not able to keep down clear liquids including water and so she was concerned and came here to the emergency department, she has so had in this time several episodes of loose watery diarrhea, some diffuse cramping abdominal pain  No fevers or chills  No chest pain palpitations or shortness of breath  Prior to Admission Medications   Prescriptions Last Dose Informant Patient Reported? Taking?    Dulaglutide (Trulicity) 3 EO/2 2QH SOPN   No Yes   Sig: Inject 0 5 mL (3 mg total) under the skin once a week   Ibuprofen-diphenhydrAMINE Cit 200-38 MG TABS Not Taking  Yes No   Sig: Take by mouth   Patient not taking: Reported on 1/1/2023   atorvastatin (LIPITOR) 40 mg tablet   Yes Yes   Sig: Take 40 mg by mouth daily Once a day   fexofenadine (ALLEGRA) 180 MG tablet   Yes Yes   Sig: Take 180 mg by mouth   furosemide (LASIX) 40 mg tablet   Yes Yes   Sig: Take 40 mg by mouth daily   gabapentin (NEURONTIN) 600 MG tablet   No Yes   Sig: Take 1 tablet (600 mg total) by mouth 3 (three) times a day   glipiZIDE (GLUCOTROL XL) 5 mg 24 hr tablet   No Yes   Sig: Take 1 tablet (5 mg total) by mouth daily   hydrochlorothiazide (HYDRODIURIL) 25 mg tablet   Yes Yes   Sig: Take 25 mg by mouth   insulin glargine (TOUJEO MAX) 300 units/mL CONCENTRATED U-300 injection pen (2-unit dial)   Yes Yes   Sig: Inject 60 Units under the skin daily at bedtime      Facility-Administered Medications: None       Past Medical History:   Diagnosis Date   • Cancer [facility])        Past Surgical History: Procedure Laterality Date   • GALLBLADDER SURGERY     • HYSTERECTOMY     • UMBILICAL HERNIA REPAIR         No family history on file  I have reviewed and agree with the history as documented  E-Cigarette/Vaping   • E-Cigarette Use Never User      E-Cigarette/Vaping Substances   • Nicotine No      Social History     Tobacco Use   • Smoking status: Former     Types: Cigarettes     Quit date: 2000     Years since quittin 1   • Smokeless tobacco: Former   Vaping Use   • Vaping Use: Never used   Substance Use Topics   • Drug use: Yes     Types: Marijuana     Comment: every day medical marijuana       Review of Systems   Constitutional: Negative for appetite change, chills, fatigue and fever  HENT: Negative for sneezing and sore throat  Eyes: Negative for visual disturbance  Respiratory: Negative for cough, choking, chest tightness, shortness of breath and wheezing  Cardiovascular: Negative for chest pain and palpitations  Gastrointestinal: Positive for abdominal pain, diarrhea, nausea and vomiting  Negative for constipation  Genitourinary: Negative for difficulty urinating and dysuria  Neurological: Negative for dizziness, weakness, light-headedness, numbness and headaches  All other systems reviewed and are negative  Physical Exam  Physical Exam  Vitals and nursing note reviewed  Constitutional:       General: She is not in acute distress  Appearance: She is well-developed  She is not diaphoretic  HENT:      Head: Normocephalic and atraumatic  Eyes:      Pupils: Pupils are equal, round, and reactive to light  Neck:      Vascular: No JVD  Trachea: No tracheal deviation  Cardiovascular:      Rate and Rhythm: Normal rate and regular rhythm  Heart sounds: Normal heart sounds  No murmur heard  No friction rub  No gallop  Pulmonary:      Effort: Pulmonary effort is normal  No respiratory distress  Breath sounds: Normal breath sounds  No wheezing or rales  Abdominal:      General: Bowel sounds are normal  There is no distension  Palpations: Abdomen is soft  Tenderness: There is no abdominal tenderness  There is no guarding or rebound  Skin:     General: Skin is warm and dry  Coloration: Skin is not pale  Neurological:      Mental Status: She is alert and oriented to person, place, and time  Cranial Nerves: No cranial nerve deficit  Motor: No abnormal muscle tone     Psychiatric:         Behavior: Behavior normal          Vital Signs  ED Triage Vitals [01/01/23 2056]   Temperature Pulse Respirations Blood Pressure SpO2   97 9 °F (36 6 °C) 72 22 (!) 188/89 96 %      Temp Source Heart Rate Source Patient Position - Orthostatic VS BP Location FiO2 (%)   Oral Monitor Sitting Left arm --      Pain Score       --           Vitals:    01/01/23 2056 01/01/23 2344   BP: (!) 188/89 134/75   Pulse: 72 72   Patient Position - Orthostatic VS: Sitting Sitting         Visual Acuity      ED Medications  Medications   sodium chloride 0 9 % bolus 1,000 mL (0 mL Intravenous Stopped 1/2/23 0054)   ondansetron (ZOFRAN) injection 4 mg (4 mg Intravenous Given 1/1/23 2259)   potassium chloride (K-DUR,KLOR-CON) CR tablet 40 mEq (40 mEq Oral Given 1/2/23 0051)       Diagnostic Studies  Results Reviewed     Procedure Component Value Units Date/Time    Comprehensive metabolic panel [[id number]]  (Abnormal) Collected: 01/01/23 2253    Lab Status: Final result Specimen: Blood from Arm, Right Updated: 01/01/23 2354     Sodium 140 mmol/L      Potassium 2 7 mmol/L      Chloride 100 mmol/L      CO2 30 mmol/L      ANION GAP 10 mmol/L      BUN 15 mg/dL      Creatinine 0 79 mg/dL      Glucose 140 mg/dL      Calcium 9 3 mg/dL      AST 23 U/L      ALT 24 U/L      Alkaline Phosphatase 86 U/L      Total Protein 8 0 g/dL      Albumin 3 6 g/dL      Total Bilirubin 0 57 mg/dL      eGFR 78 ml/min/1 73sq m     Narrative:      [place] guidelines for Chronic Kidney Disease (CKD):   •  Stage 1 with normal or high GFR (GFR > 90 mL/min/1 73 square meters)  •  Stage 2 Mild CKD (GFR = 60-89 mL/min/1 73 square meters)  •  Stage 3A Moderate CKD (GFR = 45-59 mL/min/1 73 square meters)  •  Stage 3B Moderate CKD (GFR = 30-44 mL/min/1 73 square meters)  •  Stage 4 Severe CKD (GFR = 15-29 mL/min/1 73 square meters)  •  Stage 5 End Stage CKD (GFR <15 mL/min/1 73 square meters)  Note: GFR calculation is accurate only with a steady state creatinine    Lipase [[id number]]  (Abnormal) Collected: 01/01/23 2253    Lab Status: Final result Specimen: Blood from Arm, Right Updated: 01/01/23 2351     Lipase 72 u/L     CBC and differential [[id number]]  (Abnormal) Collected: 01/01/23 2253    Lab Status: Final result Specimen: Blood from Arm, Right Updated: 01/01/23 2259     WBC 6 80 Thousand/uL      RBC 5 28 Million/uL      Hemoglobin 14 3 g/dL      Hematocrit 45 0 %      MCV 85 fL      MCH 27 1 pg      MCHC 31 8 g/dL      RDW 14 7 %      MPV 9 7 fL      Platelets 818 Thousands/uL      nRBC 0 /100 WBCs      Neutrophils Relative 69 %      Immat GRANS % 1 %      Lymphocytes Relative 11 %      Monocytes Relative 17 %      Eosinophils Relative 1 %      Basophils Relative 1 %      Neutrophils Absolute 4 78 Thousands/µL      Immature Grans Absolute 0 04 Thousand/uL      Lymphocytes Absolute 0 77 Thousands/µL      Monocytes Absolute 1 12 Thousand/µL      Eosinophils Absolute 0 05 Thousand/µL      Basophils Absolute 0 04 Thousands/µL     FLU/RSV/COVID - if FLU/RSV clinically relevant [[id number]]  (Abnormal) Collected: 01/01/23 2059    Lab Status: Final result Specimen: Nares from Nose Updated: 01/01/23 2229     SARS-CoV-2 Negative     INFLUENZA A PCR Positive     INFLUENZA B PCR Negative     RSV PCR Negative    Narrative:      FOR PEDIATRIC PATIENTS - copy/paste COVID Guidelines URL to browser: [email or web address]    SARS-CoV-2 assay is a Nucleic Acid Amplification assay intended for the  qualitative detection of nucleic acid from SARS-CoV-2 in nasopharyngeal  swabs  Results are for the presumptive identification of SARS-CoV-2 RNA  Positive results are indicative of infection with SARS-CoV-2, the virus  causing COVID-19, but do not rule out bacterial infection or co-infection  with other viruses  Laboratories within the [place] and its  territories are required to report all positive results to the appropriate  public health authorities  Negative results do not preclude SARS-CoV-2  infection and should not be used as the sole basis for treatment or other  patient management decisions  Negative results must be combined with  clinical observations, patient history, and epidemiological information  This test has not been FDA cleared or approved  This test has been authorized by FDA under an Emergency Use Authorization  (EUA)  This test is only authorized for the duration of time the  declaration that circumstances exist justifying the authorization of the  emergency use of an in vitro diagnostic tests for detection of SARS-CoV-2  virus and/or diagnosis of COVID-19 infection under section 564(b)(1) of  the Act, 21 U  S C  160AEN-6(U)(5), unless the authorization is terminated  or revoked sooner  The test has been validated but independent review by FDA  and CLIA is pending  Test performed using [employer] GeneXpert: This RT-PCR assay targets N2,  a region unique to SARS-CoV-2  A conserved region in the E-gene was chosen  for pan-Sarbecovirus detection which includes SARS-CoV-2  According to CMS-2020-01-R, this platform meets the definition of high-throughput technology                   No orders to display              Procedures  Procedures         ED Course                               SBIRT 20yo+    Flowsheet Row Most Recent Value   SBIRT (23 yo +)    In order to provide better care to our patients, we are screening all of our patients for alcohol and drug use  Would it be okay to ask you these screening questions? Yes Filed at: 01/01/2023 2350   Initial Alcohol Screen: US AUDIT-C     1  How often do you have a drink containing alcohol? 0 Filed at: 01/01/2023 2350   2  How many drinks containing alcohol do you have on a typical day you are drinking? 0 Filed at: 01/01/2023 2350   3a  Male UNDER 65: How often do you have five or more drinks on one occasion? 0 Filed at: 01/01/2023 2350   3b  FEMALE Any Age, or MALE 65+: How often do you have 4 or more drinks on one occassion? 0 Filed at: 01/01/2023 2350   Audit-C Score 0 Filed at: 01/01/2023 2350   LESLI: How many times in the past year have you    Used an illegal drug or used a prescription medication for non-medical reasons? Never Filed at: 01/01/2023 2350                    Medical Decision Making  56-year-old female with abdominal pain nausea vomiting diarrhea, also has some flulike symptoms as well, check flu swab, check electrolytes, reassess  Patient's potassium is low, repleted here, patient is feeling better after fluids and antiemetics, patient is flu positive, she is tolerating p o  without difficulty and requesting discharge, I believe this is reasonable, will discharge her with antiemetics, instructions to follow-up with PCP closely to monitor potassium level, recommend dietary repletion for now, recommend return to emergency department if symptoms worsen  Patient expresses understanding  Hypokalemia: complicated acute illness or injury  Influenza: complicated acute illness or injury  Nausea & vomiting: complicated acute illness or injury  Amount and/or Complexity of Data Reviewed  Labs: ordered  Risk  Prescription drug management            Disposition  Final diagnoses:   Influenza   Nausea & vomiting   Hypokalemia     Time reflects when diagnosis was documented in both MDM as applicable and the Disposition within this note     Time User Action Codes Description Comment 1/2/2023  1:51 AM Kristian Curiel Add [J11 1] Influenza     1/2/2023  1:51 AM Kristian Curiel Add [R11 2] Nausea & vomiting     1/2/2023  1:51 AM Federica Odsoo Add [E87 6] Hypokalemia       ED Disposition     ED Disposition   Discharge    Condition   Stable    Date/Time   Mon Jan 2, 2023  1:51 AM    Charmaine Bolden discharge to home/self care                 Follow-up Information     Follow up With Specialties Details Why Contact Info    Segundo Muller DO Family Medicine   [address]  [phone]            Discharge Medication List as of 1/2/2023  2:01 AM      START taking these medications    Details   ondansetron (ZOFRAN-ODT) 4 mg disintegrating tablet Take 1 tablet (4 mg total) by mouth every 6 (six) hours as needed for nausea or vomiting, Starting Mon 1/2/2023, Normal         CONTINUE these medications which have NOT CHANGED    Details   atorvastatin (LIPITOR) 40 mg tablet Take 40 mg by mouth daily Once a day, Historical Med      Dulaglutide (Trulicity) 3 ES/1 8FO SOPN Inject 0 5 mL (3 mg total) under the skin once a week, Starting Fri 10/28/2022, Until Thu 1/26/2023, Normal      fexofenadine (ALLEGRA) 180 MG tablet Take 180 mg by mouth, Historical Med      furosemide (LASIX) 40 mg tablet Take 40 mg by mouth daily, Historical Med      gabapentin (NEURONTIN) 600 MG tablet Take 1 tablet (600 mg total) by mouth 3 (three) times a day, Starting Sun 1/1/2023, Until Fri 6/30/2023, Normal      glipiZIDE (GLUCOTROL XL) 5 mg 24 hr tablet Take 1 tablet (5 mg total) by mouth daily, Starting Tue 11/22/2022, Normal      hydrochlorothiazide (HYDRODIURIL) 25 mg tablet Take 25 mg by mouth, Historical Med      insulin glargine (TOUJEO MAX) 300 units/mL CONCENTRATED U-300 injection pen (2-unit dial) Inject 60 Units under the skin daily at bedtime, Historical Med      Ibuprofen-diphenhydrAMINE Cit 200-38 MG TABS Take by mouth, Historical Med             No discharge procedures on file     PDMP Review       Value Time User    PDMP Reviewed  Yes 1/5/2023 10:06 AM [address], DO          ED Provider  Electronically Signed by           Lorie Berman MD  01/09/23 9303

## 2023-01-20 ENCOUNTER — RA CDI HCC (OUTPATIENT)
Dept: OTHER | Facility: HOSPITAL | Age: 68
End: 2023-01-20

## 2023-01-23 DIAGNOSIS — E11.42 TYPE 2 DIABETES MELLITUS WITH DIABETIC POLYNEUROPATHY, WITH LONG-TERM CURRENT USE OF INSULIN (HCC): ICD-10-CM

## 2023-01-23 DIAGNOSIS — Z79.4 TYPE 2 DIABETES MELLITUS WITH DIABETIC POLYNEUROPATHY, WITH LONG-TERM CURRENT USE OF INSULIN (HCC): ICD-10-CM

## 2023-01-23 RX ORDER — GLIPIZIDE 5 MG/1
TABLET, FILM COATED, EXTENDED RELEASE ORAL
Qty: 90 TABLET | Refills: 3 | Status: SHIPPED | OUTPATIENT
Start: 2023-01-23

## 2023-01-27 ENCOUNTER — HOSPITAL ENCOUNTER (OUTPATIENT)
Dept: RADIOLOGY | Facility: HOSPITAL | Age: 68
End: 2023-01-27

## 2023-01-27 ENCOUNTER — APPOINTMENT (OUTPATIENT)
Dept: LAB | Facility: CLINIC | Age: 68
End: 2023-01-27

## 2023-01-27 ENCOUNTER — OFFICE VISIT (OUTPATIENT)
Dept: INTERNAL MEDICINE CLINIC | Facility: CLINIC | Age: 68
End: 2023-01-27

## 2023-01-27 VITALS
HEART RATE: 64 BPM | SYSTOLIC BLOOD PRESSURE: 128 MMHG | RESPIRATION RATE: 20 BRPM | OXYGEN SATURATION: 96 % | DIASTOLIC BLOOD PRESSURE: 78 MMHG | BODY MASS INDEX: 48.47 KG/M2 | TEMPERATURE: 97.7 F | WEIGHT: 265 LBS

## 2023-01-27 DIAGNOSIS — J10.1 INFLUENZA A: ICD-10-CM

## 2023-01-27 DIAGNOSIS — J10.1 INFLUENZA A: Primary | ICD-10-CM

## 2023-01-27 DIAGNOSIS — E66.01 OBESITY, MORBID (HCC): ICD-10-CM

## 2023-01-27 DIAGNOSIS — R11.2 NAUSEA & VOMITING: ICD-10-CM

## 2023-01-27 DIAGNOSIS — E11.42 TYPE 2 DIABETES MELLITUS WITH DIABETIC POLYNEUROPATHY, WITH LONG-TERM CURRENT USE OF INSULIN (HCC): ICD-10-CM

## 2023-01-27 DIAGNOSIS — E87.6 HYPOKALEMIA: ICD-10-CM

## 2023-01-27 DIAGNOSIS — Z79.4 TYPE 2 DIABETES MELLITUS WITH DIABETIC POLYNEUROPATHY, WITH LONG-TERM CURRENT USE OF INSULIN (HCC): ICD-10-CM

## 2023-01-27 LAB
ANION GAP SERPL CALCULATED.3IONS-SCNC: 6 MMOL/L (ref 4–13)
BUN SERPL-MCNC: 9 MG/DL (ref 5–25)
CALCIUM SERPL-MCNC: 9.5 MG/DL (ref 8.3–10.1)
CHLORIDE SERPL-SCNC: 105 MMOL/L (ref 96–108)
CO2 SERPL-SCNC: 28 MMOL/L (ref 21–32)
CREAT SERPL-MCNC: 0.8 MG/DL (ref 0.6–1.3)
GFR SERPL CREATININE-BSD FRML MDRD: 77 ML/MIN/1.73SQ M
GLUCOSE P FAST SERPL-MCNC: 130 MG/DL (ref 65–99)
POTASSIUM SERPL-SCNC: 3.9 MMOL/L (ref 3.5–5.3)
SODIUM SERPL-SCNC: 139 MMOL/L (ref 135–147)

## 2023-01-27 RX ORDER — ONDANSETRON 4 MG/1
4 TABLET, ORALLY DISINTEGRATING ORAL EVERY 6 HOURS PRN
Qty: 20 TABLET | Refills: 0 | Status: SHIPPED | OUTPATIENT
Start: 2023-01-27 | End: 2023-01-27

## 2023-01-27 RX ORDER — ONDANSETRON 4 MG/1
4 TABLET, ORALLY DISINTEGRATING ORAL EVERY 6 HOURS PRN
Qty: 20 TABLET | Refills: 0 | Status: SHIPPED | OUTPATIENT
Start: 2023-01-27

## 2023-01-27 RX ORDER — DEXTROMETHORPHAN HYDROBROMIDE AND PROMETHAZINE HYDROCHLORIDE 15; 6.25 MG/5ML; MG/5ML
5 SOLUTION ORAL 4 TIMES DAILY PRN
Qty: 240 ML | Refills: 0 | Status: SHIPPED | OUTPATIENT
Start: 2023-01-27 | End: 2023-02-26

## 2023-01-27 RX ORDER — DEXTROMETHORPHAN HYDROBROMIDE AND PROMETHAZINE HYDROCHLORIDE 15; 6.25 MG/5ML; MG/5ML
5 SOLUTION ORAL 4 TIMES DAILY PRN
Qty: 240 ML | Refills: 0 | Status: SHIPPED | OUTPATIENT
Start: 2023-01-27 | End: 2023-01-27

## 2023-01-27 NOTE — PROGRESS NOTES
Name: Alan Ingram      : 1955      MRN: [id number]  Encounter Provider: Maggie Orr DO  Encounter Date: 2023   [facility]   [address]     1  Influenza A- slow recovery  Possibly second prodromal spike  Will evaluate for post influenza PNA  Antitussive provided  Pt encouraged to continue rest, vitamin C, and hydration  -     XR chest pa & lateral; Future; Expected date: 2023  -     Promethazine-DM (PHENERGAN-DM) 6 25-15 mg/5 mL oral syrup; Take 5 mL by mouth 4 (four) times a day as needed for cough      2  Obesity, morbid ([address] )- note counseling below     3  Nausea & vomiting- 2/2 influenza illness  Will provide antiemetic    -     ondansetron (ZOFRAN-ODT) 4 mg disintegrating tablet; Take 1 tablet (4 mg total) by mouth every 6 (six) hours as needed for nausea or vomiting    4  Hypokalemia- persistant  Was started on kdur 20mEq  Will repeat BMP to determine if an increase is needed  -     Basic metabolic panel; Future      BMI Counseling: Body mass index is 48 47 kg/m²  The BMI is above normal  Nutrition recommendations include encouraging healthy choices of fruits and vegetables, decreasing fast food intake, consuming healthier snacks and limiting drinks that contain sugar  Rationale for BMI follow-up plan is due to patient being overweight or obese  Depression Screening and Follow-up Plan: Patient was screened for depression during today's encounter  They screened negative with a PHQ-2 score of 0  Subjective      Presents for f/u  Reports very slow recovery to the flu  Still having episodes of n/v  Poor po intake  Still feels very weak and tired  Review of Systems   Constitutional: Positive for fatigue  Negative for fever  Respiratory: Positive for cough and shortness of breath  Cardiovascular: Negative for chest pain  Gastrointestinal: Positive for nausea and vomiting     Neurological: Positive for weakness (generalized)  Current Outpatient Medications on File Prior to Visit   Medication Sig   • atorvastatin (LIPITOR) 40 mg tablet Take 40 mg by mouth daily Once a day   • Dulaglutide (Trulicity) 3 VV/8 1WE SOPN Inject 0 5 mL (3 mg total) under the skin once a week   • fexofenadine (ALLEGRA) 180 MG tablet Take 180 mg by mouth   • furosemide (LASIX) 40 mg tablet Take 40 mg by mouth daily   • gabapentin (NEURONTIN) 600 MG tablet Take 1 tablet (600 mg total) by mouth 3 (three) times a day   • glipiZIDE (GLUCOTROL XL) 5 mg 24 hr tablet TAKE 1 TABLET BY MOUTH DAILY   • hydrochlorothiazide (HYDRODIURIL) 25 mg tablet Take 25 mg by mouth   • insulin glargine (TOUJEO MAX) 300 units/mL CONCENTRATED U-300 injection pen (2-unit dial) Inject 60 Units under the skin daily at bedtime   • potassium chloride (K-DUR,KLOR-CON) 20 mEq tablet Take 1 tablet (20 mEq total) by mouth daily   • [DISCONTINUED] Promethazine-DM (PHENERGAN-DM) 6 25-15 mg/5 mL oral syrup Take 5 mL by mouth 4 (four) times a day as needed for cough   • [DISCONTINUED] Ibuprofen-diphenhydrAMINE Cit 200-38 MG TABS Take by mouth (Patient not taking: Reported on 1/1/2023)   • [DISCONTINUED] ondansetron (ZOFRAN-ODT) 4 mg disintegrating tablet Take 1 tablet (4 mg total) by mouth every 6 (six) hours as needed for nausea or vomiting (Patient not taking: Reported on 1/27/2023)       Objective     /78 (BP Location: Left arm, Patient Position: Sitting, Cuff Size: Large)   Pulse 64   Temp 97 7 °F (36 5 °C) (Temporal)   Resp 20   Wt 120 kg (265 lb)   SpO2 96%   BMI 48 47 kg/m²     Physical Exam  Constitutional:       General: She is not in acute distress  Comments: Appears tired    HENT:      Head: Normocephalic and atraumatic  Mouth/Throat:      Mouth: Mucous membranes are dry  Eyes:      Conjunctiva/sclera: Conjunctivae normal    Cardiovascular:      Rate and Rhythm: Normal rate and regular rhythm        Heart sounds: No murmur heard   Pulmonary:      Effort: Pulmonary effort is normal       Breath sounds: Normal breath sounds  Neurological:      Mental Status: She is alert and oriented to person, place, and time         Chaparro Miller DO

## 2023-01-31 ENCOUNTER — TELEPHONE (OUTPATIENT)
Dept: INTERNAL MEDICINE CLINIC | Facility: CLINIC | Age: 68
End: 2023-01-31

## 2023-01-31 NOTE — TELEPHONE ENCOUNTER
----- Message from Paramjit Márquez DO sent at 1/31/2023  8:41 AM EST -----  Potassium is normal  Her electrolytes re ok  Her blood sugar is elevated  Overall blood work is ok

## 2023-02-06 ENCOUNTER — PATIENT MESSAGE (OUTPATIENT)
Dept: INTERNAL MEDICINE CLINIC | Facility: CLINIC | Age: 68
End: 2023-02-06

## 2023-02-06 DIAGNOSIS — E11.42 TYPE 2 DIABETES MELLITUS WITH DIABETIC POLYNEUROPATHY, WITH LONG-TERM CURRENT USE OF INSULIN (HCC): Primary | ICD-10-CM

## 2023-02-06 DIAGNOSIS — Z79.4 TYPE 2 DIABETES MELLITUS WITH DIABETIC POLYNEUROPATHY, WITH LONG-TERM CURRENT USE OF INSULIN (HCC): Primary | ICD-10-CM

## 2023-02-06 RX ORDER — DULOXETIN HYDROCHLORIDE 60 MG/1
60 CAPSULE, DELAYED RELEASE ORAL DAILY
Qty: 90 CAPSULE | Refills: 1 | Status: SHIPPED | OUTPATIENT
Start: 2023-02-06 | End: 2023-05-07

## 2023-02-20 ENCOUNTER — PATIENT MESSAGE (OUTPATIENT)
Dept: INTERNAL MEDICINE CLINIC | Facility: CLINIC | Age: 68
End: 2023-02-20

## 2023-02-20 DIAGNOSIS — E11.42 TYPE 2 DIABETES MELLITUS WITH DIABETIC POLYNEUROPATHY, WITH LONG-TERM CURRENT USE OF INSULIN (HCC): ICD-10-CM

## 2023-02-20 DIAGNOSIS — Z79.4 TYPE 2 DIABETES MELLITUS WITH DIABETIC POLYNEUROPATHY, WITH LONG-TERM CURRENT USE OF INSULIN (HCC): ICD-10-CM

## 2023-02-20 RX ORDER — GLIPIZIDE 5 MG/1
5 TABLET, FILM COATED, EXTENDED RELEASE ORAL DAILY
Qty: 90 TABLET | Refills: 3 | Status: SHIPPED | OUTPATIENT
Start: 2023-02-20

## 2023-03-03 ENCOUNTER — RA CDI HCC (OUTPATIENT)
Dept: OTHER | Facility: HOSPITAL | Age: 68
End: 2023-03-03

## 2023-05-04 DIAGNOSIS — I10 PRIMARY HYPERTENSION: Primary | ICD-10-CM

## 2023-05-04 RX ORDER — HYDROCHLOROTHIAZIDE 25 MG/1
25 TABLET ORAL DAILY
Qty: 90 TABLET | Refills: 1 | Status: SHIPPED | OUTPATIENT
Start: 2023-05-04

## 2023-05-04 NOTE — TELEPHONE ENCOUNTER
----- Message from Lynne Faith sent at 5/4/2023  9:13 AM EDT -----  Regarding: hydrochlorothiazide 25 mg 1 x for blood pressure  Contact: [phone]  [facility] called said tried to reach out for a new prescription on this 
Statement Selected

## 2023-05-17 DIAGNOSIS — E11.42 TYPE 2 DIABETES MELLITUS WITH DIABETIC POLYNEUROPATHY, WITH LONG-TERM CURRENT USE OF INSULIN (HCC): ICD-10-CM

## 2023-05-17 DIAGNOSIS — Z79.4 TYPE 2 DIABETES MELLITUS WITH DIABETIC POLYNEUROPATHY, WITH LONG-TERM CURRENT USE OF INSULIN (HCC): ICD-10-CM

## 2023-05-18 RX ORDER — GABAPENTIN 600 MG/1
TABLET ORAL
Qty: 270 TABLET | Refills: 3 | Status: SHIPPED | OUTPATIENT
Start: 2023-05-18

## 2023-06-16 ENCOUNTER — TELEPHONE (OUTPATIENT)
Age: 68
End: 2023-06-16

## 2023-06-16 NOTE — TELEPHONE ENCOUNTER
[facility]    Requesting the doc in patients Media  6/9/23   (order for diabetic supplies) be faxed along with last two AVS     Fax # [phone]    Ph# [phone]

## 2023-06-23 ENCOUNTER — OFFICE VISIT (OUTPATIENT)
Age: 68
End: 2023-06-23
Payer: MEDICARE

## 2023-06-23 VITALS
HEIGHT: 62 IN | DIASTOLIC BLOOD PRESSURE: 80 MMHG | WEIGHT: 245 LBS | OXYGEN SATURATION: 99 % | RESPIRATION RATE: 18 BRPM | SYSTOLIC BLOOD PRESSURE: 140 MMHG | TEMPERATURE: 98.6 F | HEART RATE: 91 BPM | BODY MASS INDEX: 45.08 KG/M2

## 2023-06-23 DIAGNOSIS — Z79.4 TYPE 2 DIABETES MELLITUS WITH DIABETIC POLYNEUROPATHY, WITH LONG-TERM CURRENT USE OF INSULIN (HCC): Primary | ICD-10-CM

## 2023-06-23 DIAGNOSIS — E11.42 TYPE 2 DIABETES MELLITUS WITH DIABETIC POLYNEUROPATHY, WITH LONG-TERM CURRENT USE OF INSULIN (HCC): Primary | ICD-10-CM

## 2023-06-23 DIAGNOSIS — R11.2 NAUSEA AND VOMITING, UNSPECIFIED VOMITING TYPE: ICD-10-CM

## 2023-06-23 DIAGNOSIS — R63.4 UNINTENTIONAL WEIGHT LOSS: ICD-10-CM

## 2023-06-23 LAB — SL AMB POCT HEMOGLOBIN AIC: 6.4 (ref ?–6.5)

## 2023-06-23 PROCEDURE — 99214 OFFICE O/P EST MOD 30 MIN: CPT | Performed by: FAMILY MEDICINE

## 2023-06-23 PROCEDURE — 83036 HEMOGLOBIN GLYCOSYLATED A1C: CPT | Performed by: FAMILY MEDICINE

## 2023-06-23 NOTE — TELEPHONE ENCOUNTER
Valdemar Carcamo received fax that Amrita send on 6/16/23/    Requesting a diagnosis code on the office notes then refax notes pls    [phone]

## 2023-06-26 ENCOUNTER — TELEPHONE (OUTPATIENT)
Age: 68
End: 2023-06-26

## 2023-06-26 NOTE — TELEPHONE ENCOUNTER
Karen THOMASON from [address] [place] [address]     phone number is [phone]  fax number is [phone]    received medical records, but we need the diagnosis codes to be included on these records  Please reflax those documents so that the patient can continue receiving supplies

## 2023-06-26 NOTE — PROGRESS NOTES
Name: Amparo Sauer      : 1955      MRN: [id number]  Encounter Provider: Zaira Pham DO  Encounter Date: 2023   Encounter department: [address] [place] [address]  Type 2 diabetes mellitus with diabetic polyneuropathy, with long-term current use of insulin (HCC)(E11 42)- well controlled with an a1c of 6 4  pt to continue glipizide 5mg qd and toujeo 60 units a day  Pt to continue check blood sugars at least 3x daily    -     POCT hemoglobin A1c    2  Nausea and vomiting, unspecified vomiting type(R11 2)  3  Unintentional weight loss(R63 4)- etiology unclear  Concerned for intraabdominal pathology  Will evaluate via imaging     -     Ambulatory Referral to Gastroenterology; Future  -     CT abdomen pelvis w contrast; Future; Expected date: 2023           Subjective      Pt reports cyclic nausea and vomiting  Going on for more than 3 months  Every 4 or 5th day of eating she has a vomiting episodes  Unintentional  No hematemesis  Has lost a significant amount of with but hasn't been trying  She is tolerating some fluids but has had episodes for coming after drinking water  Review of Systems   Constitutional: Positive for fatigue  Negative for fever  HENT: Negative for sore throat and trouble swallowing  Gastrointestinal: Positive for abdominal pain, nausea and vomiting         Current Outpatient Medications on File Prior to Visit   Medication Sig   • atorvastatin (LIPITOR) 40 mg tablet Take 1 tablet (40 mg total) by mouth daily Once a day   • DULoxetine (CYMBALTA) 60 mg delayed release capsule Take 1 capsule (60 mg total) by mouth daily   • fexofenadine (ALLEGRA) 180 MG tablet Take 180 mg by mouth   • furosemide (LASIX) 40 mg tablet Take 40 mg by mouth daily   • glipiZIDE (GLUCOTROL XL) 5 mg 24 hr tablet Take 1 tablet (5 mg total) by mouth daily   • hydrochlorothiazide (HYDRODIURIL) 25 mg tablet Take 1 tablet (25 mg total) by mouth daily "  • insulin glargine (TOUJEO MAX) 300 units/mL CONCENTRATED U-300 injection pen (2-unit dial) Inject 60 Units under the skin daily at bedtime   • ondansetron (ZOFRAN-ODT) 4 mg disintegrating tablet Take 1 tablet (4 mg total) by mouth every 6 (six) hours as needed for nausea or vomiting   • potassium chloride (K-DUR,KLOR-CON) 20 mEq tablet Take 1 tablet (20 mEq total) by mouth daily       Objective     /80 (BP Location: Left arm, Patient Position: Sitting, Cuff Size: Large)   Pulse 91   Temp 98 6 °F (37 °C) (Tympanic)   Resp 18   Ht 5' 2\" (1 575 m)   Wt 111 kg (245 lb)   SpO2 99%   BMI 44 81 kg/m²     Physical Exam  HENT:      Head: Normocephalic  Cardiovascular:      Rate and Rhythm: Normal rate and regular rhythm  Heart sounds: No murmur heard  Pulmonary:      Effort: Pulmonary effort is normal    Abdominal:      Palpations: Abdomen is soft  Tenderness: There is abdominal tenderness (generalized )  There is no guarding or rebound  Neurological:      Mental Status: She is alert and oriented to person, place, and time     Psychiatric:         Mood and Affect: Mood normal          Behavior: Behavior normal        Andie Childs DO  "

## 2023-06-27 ENCOUNTER — TELEPHONE (OUTPATIENT)
Age: 68
End: 2023-06-27

## 2023-06-27 DIAGNOSIS — I10 PRIMARY HYPERTENSION: Primary | ICD-10-CM

## 2023-06-27 NOTE — TELEPHONE ENCOUNTER
Poke to central scheduling and she will need to have blood work bun and creatinine prior to having ct done

## 2023-06-27 NOTE — TELEPHONE ENCOUNTER
Ely Bray called back to get the diagnosis codes for the office visit from most recent office note    I see one code is: E11 42  Z79 4    [facility] is requesting these for diabetic supplies  Received DWO form    Please Re-Fax with diagnosis codes; noted in the office note    F#: [phone]

## 2023-06-27 NOTE — TELEPHONE ENCOUNTER
Ольга Douglass /  Nyasia Scott    Patient is scheduled for a CT scan of the abdomen and pelvis with contrast at the [facility] on August 25    Her script only says oral contrast PO contrast   Does not say so just clarifying the script is with IV which is the contrast part and the PO or if it's just with PO contrast   It should say without a contract since contrast only refers to IV  Wanted to clarify this correction you a call back and just to see if to reschedule it and if it's if you could just clarify the script and change it if need be  If it stays PO, it should say without contrast and if it's IV, if it has IV part of it then the with contrast is fine      Please Advise  No number given

## 2023-08-11 ENCOUNTER — APPOINTMENT (OUTPATIENT)
Age: 68
End: 2023-08-11
Payer: MEDICARE

## 2023-08-11 DIAGNOSIS — I10 PRIMARY HYPERTENSION: ICD-10-CM

## 2023-08-11 LAB
ANION GAP SERPL CALCULATED.3IONS-SCNC: 4 MMOL/L
BUN SERPL-MCNC: 14 MG/DL (ref 5–25)
CALCIUM SERPL-MCNC: 9.5 MG/DL (ref 8.3–10.1)
CHLORIDE SERPL-SCNC: 106 MMOL/L (ref 96–108)
CO2 SERPL-SCNC: 31 MMOL/L (ref 21–32)
CREAT SERPL-MCNC: 0.74 MG/DL (ref 0.6–1.3)
GFR SERPL CREATININE-BSD FRML MDRD: 84 ML/MIN/1.73SQ M
GLUCOSE P FAST SERPL-MCNC: 101 MG/DL (ref 65–99)
POTASSIUM SERPL-SCNC: 3.9 MMOL/L (ref 3.5–5.3)
SODIUM SERPL-SCNC: 141 MMOL/L (ref 135–147)

## 2023-08-11 PROCEDURE — 80048 BASIC METABOLIC PNL TOTAL CA: CPT

## 2023-08-11 PROCEDURE — 36415 COLL VENOUS BLD VENIPUNCTURE: CPT

## 2023-08-15 DIAGNOSIS — E11.42 TYPE 2 DIABETES MELLITUS WITH DIABETIC POLYNEUROPATHY, WITH LONG-TERM CURRENT USE OF INSULIN (HCC): Primary | ICD-10-CM

## 2023-08-15 DIAGNOSIS — Z79.4 TYPE 2 DIABETES MELLITUS WITH DIABETIC POLYNEUROPATHY, WITH LONG-TERM CURRENT USE OF INSULIN (HCC): Primary | ICD-10-CM

## 2023-08-22 ENCOUNTER — TELEPHONE (OUTPATIENT)
Age: 68
End: 2023-08-22

## 2023-08-22 NOTE — TELEPHONE ENCOUNTER
----- Message from Sapna Mireles DO sent at 8/22/2023 12:42 PM EDT -----  BMP is ok. Potassium level is normal. sugar is improved.

## 2023-08-25 ENCOUNTER — HOSPITAL ENCOUNTER (OUTPATIENT)
Dept: CT IMAGING | Facility: CLINIC | Age: 68
Discharge: HOME | End: 2023-08-25
Payer: MEDICARE

## 2023-08-25 DIAGNOSIS — R11.2 NAUSEA AND VOMITING, UNSPECIFIED VOMITING TYPE: ICD-10-CM

## 2023-08-25 DIAGNOSIS — R63.4 UNINTENTIONAL WEIGHT LOSS: ICD-10-CM

## 2023-08-25 PROCEDURE — G1004 CDSM NDSC: HCPCS

## 2023-08-25 PROCEDURE — 74177 CT ABD & PELVIS W/CONTRAST: CPT

## 2023-08-25 RX ADMIN — IOHEXOL 100 ML: 350 INJECTION, SOLUTION INTRAVENOUS at 10:13

## 2023-08-31 DIAGNOSIS — K86.2 PANCREATIC CYST: Primary | ICD-10-CM

## 2023-08-31 PROBLEM — M47.816 LUMBAR FACET ARTHROPATHY: Status: ACTIVE | Noted: 2023-08-31

## 2023-08-31 PROBLEM — K57.90 DIVERTICULOSIS: Status: ACTIVE | Noted: 2023-08-31

## 2023-09-01 ENCOUNTER — TELEPHONE (OUTPATIENT)
Age: 68
End: 2023-09-01

## 2023-09-01 DIAGNOSIS — E78.2 MIXED HYPERLIPIDEMIA: ICD-10-CM

## 2023-09-01 RX ORDER — ATORVASTATIN CALCIUM 40 MG/1
40 TABLET, FILM COATED ORAL DAILY
Qty: 90 TABLET | Refills: 1 | Status: SHIPPED | OUTPATIENT
Start: 2023-09-01

## 2023-09-01 NOTE — TELEPHONE ENCOUNTER
Patient has a referral from her PCP to have certain tests done but is not sure what they are.   She would like to schedule in the [facility]

## 2023-09-05 DIAGNOSIS — E11.42 TYPE 2 DIABETES MELLITUS WITH DIABETIC POLYNEUROPATHY, WITH LONG-TERM CURRENT USE OF INSULIN (HCC): ICD-10-CM

## 2023-09-05 DIAGNOSIS — Z79.4 TYPE 2 DIABETES MELLITUS WITH DIABETIC POLYNEUROPATHY, WITH LONG-TERM CURRENT USE OF INSULIN (HCC): ICD-10-CM

## 2023-09-05 RX ORDER — INSULIN GLARGINE 300 U/ML
INJECTION, SOLUTION SUBCUTANEOUS
Qty: 6 ML | Refills: 11 | Status: SHIPPED | OUTPATIENT
Start: 2023-09-05

## 2023-09-12 DIAGNOSIS — Z79.4 TYPE 2 DIABETES MELLITUS WITH DIABETIC POLYNEUROPATHY, WITH LONG-TERM CURRENT USE OF INSULIN (HCC): ICD-10-CM

## 2023-09-12 DIAGNOSIS — E11.42 TYPE 2 DIABETES MELLITUS WITH DIABETIC POLYNEUROPATHY, WITH LONG-TERM CURRENT USE OF INSULIN (HCC): ICD-10-CM

## 2023-09-12 DIAGNOSIS — E78.2 MIXED HYPERLIPIDEMIA: ICD-10-CM

## 2023-09-12 RX ORDER — ATORVASTATIN CALCIUM 40 MG/1
40 TABLET, FILM COATED ORAL DAILY
Qty: 90 TABLET | Refills: 0 | OUTPATIENT
Start: 2023-09-12

## 2023-09-12 RX ORDER — INSULIN GLARGINE 300 U/ML
INJECTION, SOLUTION SUBCUTANEOUS
Qty: 6 ML | Refills: 0 | OUTPATIENT
Start: 2023-09-12

## 2023-09-13 ENCOUNTER — OFFICE VISIT (OUTPATIENT)
Age: 68
End: 2023-09-13
Payer: MEDICARE

## 2023-09-13 ENCOUNTER — TELEPHONE (OUTPATIENT)
Age: 68
End: 2023-09-13

## 2023-09-13 VITALS
HEIGHT: 62 IN | DIASTOLIC BLOOD PRESSURE: 80 MMHG | WEIGHT: 264 LBS | OXYGEN SATURATION: 98 % | SYSTOLIC BLOOD PRESSURE: 142 MMHG | HEART RATE: 75 BPM | BODY MASS INDEX: 48.58 KG/M2

## 2023-09-13 DIAGNOSIS — K86.2 PANCREATIC CYST: ICD-10-CM

## 2023-09-13 DIAGNOSIS — R10.13 EPIGASTRIC PAIN: ICD-10-CM

## 2023-09-13 DIAGNOSIS — R63.4 ABNORMAL WEIGHT LOSS: ICD-10-CM

## 2023-09-13 DIAGNOSIS — R11.0 NAUSEA: ICD-10-CM

## 2023-09-13 DIAGNOSIS — R63.4 WEIGHT LOSS: Primary | ICD-10-CM

## 2023-09-13 PROCEDURE — 99204 OFFICE O/P NEW MOD 45 MIN: CPT | Performed by: PHYSICIAN ASSISTANT

## 2023-09-13 NOTE — PROGRESS NOTES
[facility] - Outpatient Consultation  [facility] [place] 80 y.o. female MRN: [id number]  Encounter: [id number]          ASSESSMENT AND PLAN:      1. Pancreatic cyst  2. Weight loss  3. Nausea  4. Epigastric pain    Patient presents for an evaluation of an enlarging pancreatic cystic lesion. Patient reports that she has been struggling with epigastric pain, poor appetite, and nausea with vomiting at times x 2 months. She reports a weight loss of 40 lbs. CT Scan A/P with IV contrast showed an enlarging pancreatic cystic lesion of 2.4 cm (previously was 1.8cm). Will refer patient to our advanced endoscopists for further evaluation with an urgent endoscopic ultrasound with FNA. Addendum 9/15: Will also plan to rule out other causes of potential weight loss. EGD/colonoscopy, mesenteric duplex, gastric emptying study, TSH level, etc.   ______________________________________________________________________    HPI:  Patient is a pleasant 80year old female with a PMH of DM, HTN, hyperlipidemia, obesity, endometrial cancer who presents to the office for a gastrointestinal evaluation. Patient reports that she has been struggling with epigastric pain, poor appetite, and nausea with vomiting at times x 2 months. She reports a weight loss of 40 lbs. No heartburn. CT Scan A/P with IV contrast showed an enlarging pancreatic cystic lesion of 2.4 cm (previously was 1.8cm). She reports recently she started utilizing marijuana. She is a former smoker and quit in 2000. No family history of pancreatic cancer. No family history of esophageal, stomach, or colon cancer. She reports her last colonoscopy was 5 years ago and polyps were removed. REVIEW OF SYSTEMS:    CONSTITUTIONAL: Denies any fever, chills, rigors, +weight loss. HEENT: No earache or tinnitus. Denies hearing loss or visual disturbances. CARDIOVASCULAR: No chest pain or palpitations.    RESPIRATORY: Denies any cough, hemoptysis, shortness of breath or dyspnea on exertion. GASTROINTESTINAL: As noted in the History of Present Illness. GENITOURINARY: No problems with urination. Denies any hematuria or dysuria. NEUROLOGIC: No dizziness or vertigo, denies headaches. MUSCULOSKELETAL: Denies any muscle or joint pain. SKIN: Denies skin rashes or itching. ENDOCRINE: Denies excessive thirst. Denies intolerance to heat or cold. PSYCHOSOCIAL: Denies depression or anxiety. Denies any recent memory loss.        Historical Information   Past Medical History:   Diagnosis Date   • Cancer ([facility])    • Diabetes mellitus ([facility])    • Diverticulitis of colon    • Hypertension      Past Surgical History:   Procedure Laterality Date   • CHOLECYSTECTOMY     • COLONOSCOPY     • GALLBLADDER SURGERY     • HYSTERECTOMY     • UMBILICAL HERNIA REPAIR       Social History   Social History     Substance and Sexual Activity   Alcohol Use Not Currently     Social History     Substance and Sexual Activity   Drug Use Yes   • Types: Marijuana    Comment: every day medical marijuana     Social History     Tobacco Use   Smoking Status Former   • Types: Cigarettes   • Quit date: 2000   • Years since quittin.7   Smokeless Tobacco Former     Family History   Problem Relation Age of Onset   • Heart disease Mother    • Cancer Father    • Hypertension Father    • Cancer Sister    • Mental illness Sister    • Cancer Paternal Aunt    • Diabetes Brother        Meds/Allergies       Current Outpatient Medications:   •  atorvastatin (LIPITOR) 40 mg tablet  •  DULoxetine (CYMBALTA) 60 mg delayed release capsule  •  fexofenadine (ALLEGRA) 180 MG tablet  •  furosemide (LASIX) 40 mg tablet  •  glipiZIDE (GLUCOTROL XL) 5 mg 24 hr tablet  •  hydrochlorothiazide (HYDRODIURIL) 25 mg tablet  •  ondansetron (ZOFRAN-ODT) 4 mg disintegrating tablet  •  potassium chloride (K-DUR,KLOR-CON) 20 mEq tablet  •  Toujeo Max SoloStar 300 units/mL CONCENTRATED U-300 injection pen (2-unit dial)    Allergies   Allergen Reactions   • Penicillins Throat Swelling and Tongue Swelling   • Pollen Extract Abdominal Pain           Objective     Blood pressure 142/80, pulse 75, height 5' 2" (1.575 m), weight 120 kg (264 lb), SpO2 98 %. Body mass index is 48.29 kg/m². PHYSICAL EXAM:      General Appearance:   Alert, cooperative, no distress   HEENT:   Normocephalic, atraumatic, anicteric    Neck:  Supple, symmetrical, trachea midline   Lungs:   Clear to auscultation bilaterally; no rales, rhonchi or wheezing; respirations unlabored    Heart[de-identified]   Regular rate and rhythm; no murmur, rub, or gallop. Abdomen:   Soft, non-tender, non-distended; normal bowel sounds; no masses, no organomegaly    Genitalia:   Deferred    Rectal:   Deferred    Extremities:  No cyanosis, clubbing or edema    Pulses:  2+ and symmetric    Skin:  No jaundice, rashes, or lesions    Lymph nodes:  No palpable cervical lymphadenopathy        Lab Results:   No visits with results within 1 Day(s) from this visit. Latest known visit with results is:   Appointment on 08/11/2023   Component Date Value   • Sodium 08/11/2023 141    • Potassium 08/11/2023 3.9    • Chloride 08/11/2023 106    • CO2 08/11/2023 31    • ANION GAP 08/11/2023 4    • BUN 08/11/2023 14    • Creatinine 08/11/2023 0.74    • Glucose, Fasting 08/11/2023 101 (H)    • Calcium 08/11/2023 9.5    • eGFR 08/11/2023 84          Radiology Results:   CT abdomen pelvis w contrast    Result Date: 8/31/2023  Narrative: CT ABDOMEN AND PELVIS WITH IV CONTRAST INDICATION:   R11.2: Nausea with vomiting, unspecified R63.4: Abnormal weight loss. COMPARISON: Report of CT abdomen and pelvis February 2016 TECHNIQUE:  CT examination of the abdomen and pelvis was performed. Multiplanar 2D reformatted images were created from the source data.  This examination, like all CT scans performed in the [facility], was performed utilizing techniques to minimize radiation dose exposure, including the use of iterative reconstruction and automated exposure control. Radiation dose length product (DLP) for this visit:  1710 mGy-cm IV Contrast:  100 mL of iohexol (OMNIPAQUE) Enteric Contrast:  Enteric contrast was not administered. FINDINGS: ABDOMEN LOWER CHEST:  No clinically significant abnormality identified in the visualized lower chest. LIVER/BILIARY TREE:  Unremarkable. GALLBLADDER: Gallbladder is surgically absent. SPLEEN:  Unremarkable. PANCREAS: 2.4 x 1.9 cm cystic lesion is demonstrated arising in the body of the pancreas. This has enlarged compared to the report of the previous exam when it measured 1.8 x 1.6 cm. Correlation with MRI abdomen with MRCP is recommended. There are 2 peripheral calcifications along the lateral aspect of the lesion. ADRENAL GLANDS:  Unremarkable. KIDNEYS/URETERS:  No hydronephrosis or urinary tract calculus. One or more sharply circumscribed subcentimeter renal hypodensities are present, too small to accurately characterize, and statistically most likely benign findings. According to recent literature (Radiology 2019) no further workup of these findings is recommended. Excretion of contrast by the renal collecting systems is seen with small amount of contrast in the urinary bladder. STOMACH AND BOWEL: Stomach is unremarkable. Small bowel loops are normal. Moderate amount of stool is seen in the colon. There is colonic diverticulosis without evidence of acute diverticulitis. APPENDIX:  No findings to suggest appendicitis. ABDOMINOPELVIC CAVITY:  No ascites. No pneumoperitoneum. No lymphadenopathy. VESSELS:  Unremarkable for patient's age. PELVIS REPRODUCTIVE ORGANS:  Surgical changes of prior hysterectomy. URINARY BLADDER:  Unremarkable. ABDOMINAL WALL/INGUINAL REGIONS: Postsurgical changes in the anterior abdominal wall. OSSEOUS STRUCTURES: Grade 1 anterolisthesis of L4 on L5. Severe lumbar facet hypertrophy. Thoracic and lumbar spondylosis.  Mild old superior endplate compressions from T10-T12. Impression: 1. 2.4 cm cystic lesion pancreatic body lesion, enlarged compared to report of prior CT. Evaluation MRI abdomen with MRCP is recommended. 2. No inflammatory changes or bowel obstruction. The study was marked in [facility] for significant notification and additional evaluation.  Workstation performed: [id number]

## 2023-09-13 NOTE — LETTER
September 15, 2023     Charity Orona DO  [address] [place] [address]    Patient: Sally Bello   YOB: 1955   Date of Visit: 9/13/2023       Dear Dr. Subha Tenorio: Thank you for referring Sally Bello to me for evaluation. Below are my notes for this consultation. If you have questions, please do not hesitate to call me. I look forward to following your patient along with you. Sincerely,        Clau Brunner PA-C        CC: No Recipients    Mercy Mejia  9/13/2023  2:35 PM  Attested  [facility] - Outpatient Consultation  Sally Bello 80 y.o. female MRN: [id number]  Encounter: [id number]          ASSESSMENT AND PLAN:      1. Pancreatic cyst  2. Weight loss  3. Nausea  4. Epigastric pain    Patient presents for an evaluation of an enlarging pancreatic cystic lesion. Patient reports that she has been struggling with epigastric pain, poor appetite, and nausea with vomiting at times x 2 months. She reports a weight loss of 40 lbs. CT Scan A/P with IV contrast showed an enlarging pancreatic cystic lesion of 2.4 cm (previously was 1.8cm). Will refer patient to our advanced endoscopists for further evaluation with an urgent endoscopic ultrasound with FNA. Also, suggested she reach out to her PCP regarding her Toujeo as this medication delays gastric emptying and could be contributing to her symptoms. Zofran prn.  ______________________________________________________________________    HPI:  Patient is a pleasant 80year old female with a PMH of DM, HTN, hyperlipidemia, obesity, endometrial cancer who presents to the office for a gastrointestinal evaluation. Patient reports that she has been struggling with epigastric pain, poor appetite, and nausea with vomiting at times x 2 months. She reports a weight loss of 40 lbs. No heartburn.   CT Scan A/P with IV contrast showed an enlarging pancreatic cystic lesion of 2.4 cm (previously was 1.8cm). She reports recently she started utilizing marijuana. She is a former smoker and quit in . No family history of pancreatic cancer. No family history of esophageal, stomach, or colon cancer. She reports her last colonoscopy was 5 years ago and polyps were removed (she reports she is due for a repeat and will be following up for a repeat after her upper GI symptoms are evaluated). REVIEW OF SYSTEMS:    CONSTITUTIONAL: Denies any fever, chills, rigors, +weight loss. HEENT: No earache or tinnitus. Denies hearing loss or visual disturbances. CARDIOVASCULAR: No chest pain or palpitations. RESPIRATORY: Denies any cough, hemoptysis, shortness of breath or dyspnea on exertion. GASTROINTESTINAL: As noted in the History of Present Illness. GENITOURINARY: No problems with urination. Denies any hematuria or dysuria. NEUROLOGIC: No dizziness or vertigo, denies headaches. MUSCULOSKELETAL: Denies any muscle or joint pain. SKIN: Denies skin rashes or itching. ENDOCRINE: Denies excessive thirst. Denies intolerance to heat or cold. PSYCHOSOCIAL: Denies depression or anxiety. Denies any recent memory loss.        Historical Information   Past Medical History:   Diagnosis Date   • Cancer ([address])    • Diabetes mellitus ([address])    • Diverticulitis of colon    • Hypertension      Past Surgical History:   Procedure Laterality Date   • CHOLECYSTECTOMY     • COLONOSCOPY     • GALLBLADDER SURGERY     • HYSTERECTOMY     • UMBILICAL HERNIA REPAIR       Social History   Social History     Substance and Sexual Activity   Alcohol Use Not Currently     Social History     Substance and Sexual Activity   Drug Use Yes   • Types: Marijuana    Comment: every day medical marijuana     Social History     Tobacco Use   Smoking Status Former   • Types: Cigarettes   • Quit date: 2000   • Years since quittin.7   Smokeless Tobacco Former     Family History   Problem Relation Age of Onset   • Heart disease Mother    • Cancer Father    • Hypertension Father    • Cancer Sister    • Mental illness Sister    • Cancer Paternal Aunt    • Diabetes Brother        Meds/Allergies       Current Outpatient Medications:   •  atorvastatin (LIPITOR) 40 mg tablet  •  DULoxetine (CYMBALTA) 60 mg delayed release capsule  •  fexofenadine (ALLEGRA) 180 MG tablet  •  furosemide (LASIX) 40 mg tablet  •  glipiZIDE (GLUCOTROL XL) 5 mg 24 hr tablet  •  hydrochlorothiazide (HYDRODIURIL) 25 mg tablet  •  ondansetron (ZOFRAN-ODT) 4 mg disintegrating tablet  •  potassium chloride (K-DUR,KLOR-CON) 20 mEq tablet  •  Toujeo Max SoloStar 300 units/mL CONCENTRATED U-300 injection pen (2-unit dial)    Allergies   Allergen Reactions   • Penicillins Throat Swelling and Tongue Swelling   • Pollen Extract Abdominal Pain           Objective     Blood pressure 142/80, pulse 75, height 5' 2" (1.575 m), weight 120 kg (264 lb), SpO2 98 %. Body mass index is 48.29 kg/m². PHYSICAL EXAM:      General Appearance:   Alert, cooperative, no distress   HEENT:   Normocephalic, atraumatic, anicteric    Neck:  Supple, symmetrical, trachea midline   Lungs:   Clear to auscultation bilaterally; no rales, rhonchi or wheezing; respirations unlabored    Heart[de-identified]   Regular rate and rhythm; no murmur, rub, or gallop. Abdomen:   Soft, non-tender, non-distended; normal bowel sounds; no masses, no organomegaly    Genitalia:   Deferred    Rectal:   Deferred    Extremities:  No cyanosis, clubbing or edema    Pulses:  2+ and symmetric    Skin:  No jaundice, rashes, or lesions    Lymph nodes:  No palpable cervical lymphadenopathy        Lab Results:   No visits with results within 1 Day(s) from this visit.    Latest known visit with results is:   Appointment on 08/11/2023   Component Date Value   • Sodium 08/11/2023 141    • Potassium 08/11/2023 3.9    • Chloride 08/11/2023 106    • CO2 08/11/2023 31    • ANION GAP 08/11/2023 4    • BUN 08/11/2023 14    • Creatinine 08/11/2023 0.74    • Glucose, Fasting 08/11/2023 101 (H)    • Calcium 08/11/2023 9.5    • eGFR 08/11/2023 84          Radiology Results:   CT abdomen pelvis w contrast    Result Date: 8/31/2023  Narrative: CT ABDOMEN AND PELVIS WITH IV CONTRAST INDICATION:   R11.2: Nausea with vomiting, unspecified R63.4: Abnormal weight loss. COMPARISON: Report of CT abdomen and pelvis February 2016 TECHNIQUE:  CT examination of the abdomen and pelvis was performed. Multiplanar 2D reformatted images were created from the source data. This examination, like all CT scans performed in the [facility], was performed utilizing techniques to minimize radiation dose exposure, including the use of iterative reconstruction and automated exposure control. Radiation dose length product (DLP) for this visit:  1710 mGy-cm IV Contrast:  100 mL of iohexol (OMNIPAQUE) Enteric Contrast:  Enteric contrast was not administered. FINDINGS: ABDOMEN LOWER CHEST:  No clinically significant abnormality identified in the visualized lower chest. LIVER/BILIARY TREE:  Unremarkable. GALLBLADDER: Gallbladder is surgically absent. SPLEEN:  Unremarkable. PANCREAS: 2.4 x 1.9 cm cystic lesion is demonstrated arising in the body of the pancreas. This has enlarged compared to the report of the previous exam when it measured 1.8 x 1.6 cm. Correlation with MRI abdomen with MRCP is recommended. There are 2 peripheral calcifications along the lateral aspect of the lesion. ADRENAL GLANDS:  Unremarkable. KIDNEYS/URETERS:  No hydronephrosis or urinary tract calculus. One or more sharply circumscribed subcentimeter renal hypodensities are present, too small to accurately characterize, and statistically most likely benign findings. According to recent literature (Radiology 2019) no further workup of these findings is recommended. Excretion of contrast by the renal collecting systems is seen with small amount of contrast in the urinary bladder.  STOMACH AND BOWEL: Stomach is unremarkable. Small bowel loops are normal. Moderate amount of stool is seen in the colon. There is colonic diverticulosis without evidence of acute diverticulitis. APPENDIX:  No findings to suggest appendicitis. ABDOMINOPELVIC CAVITY:  No ascites. No pneumoperitoneum. No lymphadenopathy. VESSELS:  Unremarkable for patient's age. PELVIS REPRODUCTIVE ORGANS:  Surgical changes of prior hysterectomy. URINARY BLADDER:  Unremarkable. ABDOMINAL WALL/INGUINAL REGIONS: Postsurgical changes in the anterior abdominal wall. OSSEOUS STRUCTURES: Grade 1 anterolisthesis of L4 on L5. Severe lumbar facet hypertrophy. Thoracic and lumbar spondylosis. Mild old superior endplate compressions from T10-T12. Impression: 1. 2.4 cm cystic lesion pancreatic body lesion, enlarged compared to report of prior CT. Evaluation MRI abdomen with MRCP is recommended. 2. No inflammatory changes or bowel obstruction. The study was marked in [facility] for significant notification and additional evaluation. Workstation performed: [id number]   Attestation signed by Malcom Bhatt MD at 9/13/2023  3:55 PM:  I supervised the Advanced Practitioner. I reviewed the Advanced Practitioner note and agree.     Malcom Bhatt MD 09/13/23

## 2023-09-15 ENCOUNTER — TELEPHONE (OUTPATIENT)
Age: 68
End: 2023-09-15

## 2023-09-15 ENCOUNTER — NURSE TRIAGE (OUTPATIENT)
Age: 68
End: 2023-09-15

## 2023-09-15 NOTE — TELEPHONE ENCOUNTER
SPOKE WITH PT, WANTS YOU TO KNOW SHE HAS BEEN TAKING TRULICITY, WAS INCREASED  UNITS WEEKLY IN 1/2023. THIS IS NOT ON HER MEDICATION LIST AND WANTED YOU TO BE AWARE IN CASE THIS ALTERS HER PLAN OF CARE. THANKS.

## 2023-09-15 NOTE — TELEPHONE ENCOUNTER
Planning for EUS for enlarging pancreatic cystic lesion. Appreciate Dr. Navarro Generous input: the amount of weight loss seems too much from a cyst that size. Also, recommend evaluating for other sources. Will plan for EGD/colonoscopy, mesenteric duplex, GES and check a TSH level. LMOM for patient to call back to discuss plan.

## 2023-09-15 NOTE — TELEPHONE ENCOUNTER
Reviewed plan with patient as detailed below and she is agreeable. Can you please schedule patient for EGD/colonoscopy (diagnoses: weight loss, epigastric pain, nausea, history of colon polyps).

## 2023-09-15 NOTE — TELEPHONE ENCOUNTER
Patients GI provider:  SHAAN Lockhart    Number to return call: [phone]    Reason for call: Pt returning [address] call and would like a call back.     Scheduled procedure/appointment date if applicable: N/A

## 2023-09-15 NOTE — TELEPHONE ENCOUNTER
Trulicity does delay gastric emptying and can cause nausea, vomiting, and decreased appetite. Recommend she talk to her prescriber about stopping this medication. Please inform, thanks!

## 2023-09-18 ENCOUNTER — TELEPHONE (OUTPATIENT)
Dept: GASTROENTEROLOGY | Facility: CLINIC | Age: 68
End: 2023-09-18

## 2023-09-18 NOTE — TELEPHONE ENCOUNTER
Scheduled date of EUS(as of today): 11/14/2023  Physician performing EUS: Sera  Location of EUS: [facility]  Instructions reviewed with patient by: Yolanda Alberto and sent in the mail  Clearances:  NONE    Patient is diabetic

## 2023-09-18 NOTE — TELEPHONE ENCOUNTER
Spoke to pt. She has left a message with the provider and waiting on response. She will continue with test as ordered.

## 2023-09-18 NOTE — TELEPHONE ENCOUNTER
Spoke to ptn, she said one of her doctors was doubling her meds and she believes that's what was makig her sick.  She is declining the EGd/Colonoscopy at this time but would like to proceed with the EUS

## 2023-10-02 DIAGNOSIS — Z79.4 TYPE 2 DIABETES MELLITUS WITH DIABETIC POLYNEUROPATHY, WITH LONG-TERM CURRENT USE OF INSULIN (HCC): ICD-10-CM

## 2023-10-02 DIAGNOSIS — E11.42 TYPE 2 DIABETES MELLITUS WITH DIABETIC POLYNEUROPATHY, WITH LONG-TERM CURRENT USE OF INSULIN (HCC): ICD-10-CM

## 2023-10-02 RX ORDER — INSULIN GLARGINE 300 U/ML
60 INJECTION, SOLUTION SUBCUTANEOUS
Qty: 6 ML | Refills: 11 | Status: SHIPPED | OUTPATIENT
Start: 2023-10-02

## 2023-10-02 NOTE — TELEPHONE ENCOUNTER
----- Message from Kike Rojas sent at 10/1/2023  1:39 PM EDT -----  Regarding: toujeo   Contact: [phone]  requested this and for some reason my chart wont let me order it and i am so close to being out.,  please help me.

## 2023-10-12 ENCOUNTER — TELEPHONE (OUTPATIENT)
Age: 68
End: 2023-10-12

## 2023-10-12 NOTE — TELEPHONE ENCOUNTER
Pt blood sugar as of today is morning 176  end of the day it around 414 woke up to 50. Blood sugar right now is 230 ate this morning. She was taken off of trulicity, is this affecting her sugar. How should to take toujeo max?     Call pt back [phone]

## 2023-10-16 ENCOUNTER — OFFICE VISIT (OUTPATIENT)
Age: 68
End: 2023-10-16
Payer: MEDICARE

## 2023-10-16 VITALS
TEMPERATURE: 97.5 F | HEART RATE: 60 BPM | BODY MASS INDEX: 45.58 KG/M2 | WEIGHT: 249.2 LBS | SYSTOLIC BLOOD PRESSURE: 128 MMHG | DIASTOLIC BLOOD PRESSURE: 70 MMHG | OXYGEN SATURATION: 98 % | RESPIRATION RATE: 18 BRPM

## 2023-10-16 DIAGNOSIS — Z79.4 TYPE 2 DIABETES MELLITUS WITH DIABETIC POLYNEUROPATHY, WITH LONG-TERM CURRENT USE OF INSULIN (HCC): ICD-10-CM

## 2023-10-16 DIAGNOSIS — I10 PRIMARY HYPERTENSION: ICD-10-CM

## 2023-10-16 DIAGNOSIS — Z00.00 ENCOUNTER FOR SUBSEQUENT ANNUAL WELLNESS VISIT (AWV) IN MEDICARE PATIENT: Primary | ICD-10-CM

## 2023-10-16 DIAGNOSIS — N39.41 URGE URINARY INCONTINENCE: ICD-10-CM

## 2023-10-16 DIAGNOSIS — M47.816 LUMBAR FACET ARTHROPATHY: ICD-10-CM

## 2023-10-16 DIAGNOSIS — K86.2 PANCREATIC CYST: ICD-10-CM

## 2023-10-16 DIAGNOSIS — E11.42 TYPE 2 DIABETES MELLITUS WITH DIABETIC POLYNEUROPATHY, WITH LONG-TERM CURRENT USE OF INSULIN (HCC): ICD-10-CM

## 2023-10-16 PROCEDURE — 99214 OFFICE O/P EST MOD 30 MIN: CPT | Performed by: FAMILY MEDICINE

## 2023-10-16 PROCEDURE — G0439 PPPS, SUBSEQ VISIT: HCPCS | Performed by: FAMILY MEDICINE

## 2023-10-16 RX ORDER — GABAPENTIN 300 MG/1
600 CAPSULE ORAL 3 TIMES DAILY
Qty: 540 CAPSULE | Refills: 1 | Status: SHIPPED | OUTPATIENT
Start: 2023-10-16 | End: 2024-01-14

## 2023-10-16 RX ORDER — INSULIN GLARGINE 300 U/ML
45 INJECTION, SOLUTION SUBCUTANEOUS
Qty: 6 ML | Refills: 11
Start: 2023-10-16

## 2023-10-16 NOTE — PROGRESS NOTES
Assessment and Plan:     Problem List Items Addressed This Visit       Primary hypertension-well-controlled on current antihypertensive. Patient continue hydrochlorothiazide 25 mg daily, Lasix 20 mg daily and daily potassium supplementation. Type 2 diabetes mellitus with diabetic polyneuropathy, with long-term current use of insulin ([facility])-pt reports episodes of hypoglycemia. Currently taking basal insulin and glipizide 5 mg daily. Unable to continue Trulicity due to evolving pancreatic cyst.  decrease Toujeo to 45 units nightly. Continue glipizide 5 mg daily. Patient educated on frequency of when she should check her blood sugar which is in no sooner than 2 hours after eating and in the morning before breakfast.    Relevant Medications    insulin glargine (Toujeo Max SoloStar) 300 units/mL CONCENTRATED U-300 injection pen (2-unit dial)    Other Relevant Orders    Albumin / creatinine urine ratio    Basic metabolic panel    Hemoglobin A1C    Pancreatic cyst-2.4 cm enlarging cyst found on 8/25/2023 abdominal CT. Patient has since established with GI. She's been referred to an advanced endoscopist for probable ultrasound with FNA. She will obtain an EGD/[place] mesenteric duplex and a gastric emptying study for further evaluation. Lumbar facet arthropathy-longstanding issue. History of gabapentin use. Patient has since discontinued medication thinking that it was not providing relief. Since being off of gabapentin for greater than 3 weeks she has noted worsening of pain. Will restart at 600 mg 3 times daily. Patient to continue Cymbalta 60 mg daily. Relevant Medications    gabapentin (NEURONTIN) 300 mg capsule     Other Visit Diagnoses       Encounter for subsequent annual wellness visit (AWV) in Medicare patient    -  Primary    Urge urinary incontinence    -see counseling below            Depression Screening and Follow-up Plan: Patient was screened for depression during today's encounter.  They screened negative with a PHQ-2 score of 1. Falls Plan of Care: balance, strength, and gait training instructions were provided. Urinary Incontinence Plan of Care: counseling topics discussed: use restroom every 2 hours, limit alcohol, caffeine, spicy foods, and acidic foods and limiting fluid intake 3-4 hours before bed. Preventive health issues were discussed with patient, and age appropriate screening tests were ordered as noted in patient's After Visit Summary. Personalized health advice and appropriate referrals for health education or preventive services given if needed, as noted in patient's After Visit Summary. History of Present Illness:     Patient presents for a Medicare Wellness Visit    Discussed diabetes regimen with patient. No longer on Trulicity due to recent change in her pancreatic cyst.  This is being evaluated by GI. Only taking glipizide 5 mg and 50 units of [place]. Patient reports blood sugars in the a.m. between 61 and 46s. She admits to checking her blood sugars frequently and often times soon after eating.        Patient Care Team:  Rohan Zambrano DO as PCP - General (Family Medicine)         Problem List:     Patient Active Problem List   Diagnosis    Obesity, morbid ([address])    Mixed hyperlipidemia    Primary hypertension    Type 2 diabetes mellitus with diabetic polyneuropathy, with long-term current use of insulin (HCC)    Diverticulosis    Pancreatic cyst    Lumbar facet arthropathy      Past Medical and Surgical History:     Past Medical History:   Diagnosis Date    Cancer ([address])     Diabetes mellitus ([address])     Diverticulitis of colon     Hypertension      Past Surgical History:   Procedure Laterality Date    CHOLECYSTECTOMY      COLONOSCOPY      GALLBLADDER SURGERY      HYSTERECTOMY      UMBILICAL HERNIA REPAIR        Family History:     Family History   Problem Relation Age of Onset    Heart disease Mother     Cancer Father     Hypertension Father Cancer Sister     Mental illness Sister     Cancer Paternal Aunt     Diabetes Brother       Social History:     Social History     Socioeconomic History    Marital status: /Civil Union     Spouse name: None    Number of children: None    Years of education: None    Highest education level: None   Occupational History    None   Tobacco Use    Smoking status: Former     Types: Cigarettes     Quit date: 2000     Years since quittin.8    Smokeless tobacco: Former   Vaping Use    Vaping Use: Never used   Substance and Sexual Activity    Alcohol use: Not Currently    Drug use: Yes     Types: Marijuana     Comment: every day medical marijuana    Sexual activity: Not Currently     Partners: Male     Birth control/protection: None   Other Topics Concern    None   Social History Narrative    None     Social Determinants of Health     Financial Resource Strain: High Risk (10/16/2023)    Overall Financial Resource Strain (CARDIA)     Difficulty of Paying Living Expenses: Hard   Food Insecurity: Not on file   Transportation Needs: No Transportation Needs (10/16/2023)    PRAPARE - Transportation     Lack of Transportation (Medical): No     Lack of Transportation (Non-Medical):  No   Physical Activity: Not on file   Stress: Stress Concern Present (10/28/2022)    [facility]     Feeling of Stress : Rather much   Social Connections: Not on file   Intimate Partner Violence: Not on file   Housing Stability: Not on file      Medications and Allergies:     Current Outpatient Medications   Medication Sig Dispense Refill    atorvastatin (LIPITOR) 40 mg tablet TAKE 1 TABLET BY MOUTH ONCE  DAILY 90 tablet 1    DULoxetine (CYMBALTA) 60 mg delayed release capsule TAKE 1 CAPSULE BY MOUTH DAILY 90 capsule 2    fexofenadine (ALLEGRA) 180 MG tablet Take 180 mg by mouth      furosemide (LASIX) 40 mg tablet Take 40 mg by mouth daily      gabapentin (NEURONTIN) 300 mg capsule Take 2 capsules (600 mg total) by mouth 3 (three) times a day 540 capsule 1    glipiZIDE (GLUCOTROL XL) 5 mg 24 hr tablet Take 1 tablet (5 mg total) by mouth daily 90 tablet 3    hydrochlorothiazide (HYDRODIURIL) 25 mg tablet Take 1 tablet (25 mg total) by mouth daily 90 tablet 1    insulin glargine (Toujeo Max SoloStar) 300 units/mL CONCENTRATED U-300 injection pen (2-unit dial) Inject 45 Units under the skin daily at bedtime 6 mL 11    ondansetron (ZOFRAN-ODT) 4 mg disintegrating tablet Take 1 tablet (4 mg total) by mouth every 6 (six) hours as needed for nausea or vomiting 20 tablet 0    potassium chloride (K-DUR,KLOR-CON) 20 mEq tablet Take 1 tablet (20 mEq total) by mouth daily 60 tablet 0     No current facility-administered medications for this visit. Allergies   Allergen Reactions    Penicillins Throat Swelling and Tongue Swelling    Pollen Extract Abdominal Pain      Immunizations:     Immunization History   Administered Date(s) Administered    COVID-19 PFIZER VACCINE 0.3 ML IM 04/19/2021, 05/14/2021, 05/14/2021    INFLUENZA 09/23/2022    Influenza, high dose seasonal 0.7 mL 09/23/2022      Health Maintenance:         Topic Date Due    Colorectal Cancer Screening  Never done    Breast Cancer Screening: Mammogram  10/16/2024 (Originally 6/8/1995)    Hepatitis C Screening  Completed         Topic Date Due    Pneumococcal Vaccine: 65+ Years (1 - PCV) Never done    COVID-19 Vaccine (4 - Pfizer series) 07/09/2021    Influenza Vaccine (1) 09/01/2023      Medicare Screening Tests and Risk Assessments:     Evangelina Turner is here for her Subsequent Wellness visit. Health Risk Assessment:   Patient rates overall health as fair. Patient feels that their physical health rating is slightly better. Patient is satisfied with their life. Eyesight was rated as same. Hearing was rated as same. Patient feels that their emotional and mental health rating is slightly worse. Patients states they are often angry. Patient states they are often unusually tired/fatigued. Pain experienced in the last 7 days has been a lot. Patient's pain rating has been 10/10. Patient states that she has experienced weight loss or gain in last 6 months. Depression Screening:   PHQ-2 Score: 1      Fall Risk Screening: In the past year, patient has experienced: history of falling in past year    Number of falls: 1  Injured during fall?: Yes    Feels unsteady when standing or walking?: Yes    Worried about falling?: Yes      Urinary Incontinence Screening:   Patient has leaked urine accidently in the last six months. Home Safety:  Patient has trouble with stairs inside or outside of their home. Patient has working smoke alarms and has working carbon monoxide detector. No stairs at home    Nutrition:   Current diet is Regular. Medications:   Patient is not currently taking any over-the-counter supplements. Patient is able to manage medications. Activities of Daily Living (ADLs)/Instrumental Activities of Daily Living (IADLs):   Walk and transfer into and out of bed and chair?: Yes  Dress and groom yourself?: Yes    Bathe or shower yourself?: Yes    Feed yourself?  Yes  Do your laundry/housekeeping?: Yes  Manage your money, pay your bills and track your expenses?: Yes  Make your own meals?: Yes    Do your own shopping?: Yes    Previous Hospitalizations:   Any hospitalizations or ED visits within the last 12 months?: No      Advance Care Planning:   Living will: No    Durable POA for healthcare: Yes      PREVENTIVE SCREENINGS      Cardiovascular Screening:    General: Screening Not Indicated and History Lipid Disorder      Diabetes Screening:     General: Screening Not Indicated and History Diabetes      Colorectal Cancer Screening:     General: Screening Current      Cervical Cancer Screening:    General: Screening Not Indicated      Lung Cancer Screening:     General: Screening Not Indicated      Hepatitis C Screening:    General: Screening Current    Screening, Brief Intervention, and Referral to Treatment (SBIRT)    Screening  Typical number of drinks in a day: 0  Typical number of drinks in a week: 0  Interpretation: Low risk drinking behavior. Single Item Drug Screening:  How often have you used an illegal drug (including marijuana) or a prescription medication for non-medical reasons in the past year? never    Single Item Drug Screen Score: 0  Interpretation: Negative screen for possible drug use disorder    No results found. Physical Exam:     /70 (BP Location: Left arm, Patient Position: Sitting, Cuff Size: Large)   Pulse 60   Temp 97.5 °F (36.4 °C) (Temporal)   Resp 18   Wt 113 kg (249 lb 3.2 oz)   SpO2 98%   BMI 45.58 kg/m²     Physical Exam  HENT:      Head: Normocephalic and atraumatic. Right Ear: External ear normal.      Left Ear: External ear normal.   Eyes:      Conjunctiva/sclera: Conjunctivae normal.   Cardiovascular:      Rate and Rhythm: Normal rate and regular rhythm. Heart sounds: No murmur heard. Pulmonary:      Effort: Pulmonary effort is normal.      Breath sounds: Normal breath sounds. Abdominal:      General: Bowel sounds are normal.      Palpations: Abdomen is soft. Musculoskeletal:      Right lower leg: Edema (trace) present. Left lower leg: Edema (trace) present. Neurological:      Mental Status: She is alert and oriented to person, place, and time.    Psychiatric:         Mood and Affect: Mood normal.         Behavior: Behavior normal.          Diane Mays DO

## 2023-10-16 NOTE — PATIENT INSTRUCTIONS
Continue to take glipizide 5mg   Decrease the insulin to 45 units at bedtime  Restart  gabapentin 600mg three times a day

## 2023-11-12 DIAGNOSIS — I10 PRIMARY HYPERTENSION: ICD-10-CM

## 2023-11-12 RX ORDER — HYDROCHLOROTHIAZIDE 25 MG/1
25 TABLET ORAL DAILY
Qty: 90 TABLET | Refills: 1 | Status: SHIPPED | OUTPATIENT
Start: 2023-11-12

## 2023-11-13 ENCOUNTER — TELEPHONE (OUTPATIENT)
Dept: GASTROENTEROLOGY | Facility: HOSPITAL | Age: 68
End: 2023-11-13

## 2023-11-13 ENCOUNTER — TELEPHONE (OUTPATIENT)
Age: 68
End: 2023-11-13

## 2023-11-14 ENCOUNTER — HOSPITAL ENCOUNTER (OUTPATIENT)
Dept: GASTROENTEROLOGY | Facility: HOSPITAL | Age: 68
Setting detail: OUTPATIENT SURGERY
Discharge: HOME | End: 2023-11-14
Attending: INTERNAL MEDICINE
Payer: MEDICARE

## 2023-11-14 ENCOUNTER — ANESTHESIA EVENT (OUTPATIENT)
Dept: GASTROENTEROLOGY | Facility: HOSPITAL | Age: 68
End: 2023-11-14

## 2023-11-14 ENCOUNTER — NURSE TRIAGE (OUTPATIENT)
Age: 68
End: 2023-11-14

## 2023-11-14 ENCOUNTER — ANESTHESIA (OUTPATIENT)
Dept: GASTROENTEROLOGY | Facility: HOSPITAL | Age: 68
End: 2023-11-14

## 2023-11-14 VITALS
WEIGHT: 249.12 LBS | SYSTOLIC BLOOD PRESSURE: 139 MMHG | HEART RATE: 68 BPM | BODY MASS INDEX: 45.84 KG/M2 | OXYGEN SATURATION: 95 % | HEIGHT: 62 IN | DIASTOLIC BLOOD PRESSURE: 77 MMHG | RESPIRATION RATE: 18 BRPM | TEMPERATURE: 96.8 F

## 2023-11-14 DIAGNOSIS — K86.2 PANCREATIC CYST: ICD-10-CM

## 2023-11-14 DIAGNOSIS — R10.13 EPIGASTRIC PAIN: ICD-10-CM

## 2023-11-14 PROCEDURE — 88305 TISSUE EXAM BY PATHOLOGIST: CPT | Performed by: PATHOLOGY

## 2023-11-14 PROCEDURE — 43242 EGD US FINE NEEDLE BX/ASPIR: CPT | Performed by: INTERNAL MEDICINE

## 2023-11-14 PROCEDURE — C1889 IMPLANT/INSERT DEVICE, NOC: HCPCS

## 2023-11-14 PROCEDURE — 43239 EGD BIOPSY SINGLE/MULTIPLE: CPT | Performed by: INTERNAL MEDICINE

## 2023-11-14 PROCEDURE — 43254 EGD ENDO MUCOSAL RESECTION: CPT | Performed by: INTERNAL MEDICINE

## 2023-11-14 PROCEDURE — 88360 TUMOR IMMUNOHISTOCHEM/MANUAL: CPT | Performed by: PATHOLOGY

## 2023-11-14 PROCEDURE — 88342 IMHCHEM/IMCYTCHM 1ST ANTB: CPT | Performed by: PATHOLOGY

## 2023-11-14 RX ORDER — CIPROFLOXACIN 500 MG/1
500 TABLET, FILM COATED ORAL EVERY 12 HOURS SCHEDULED
Qty: 6 TABLET | Refills: 0 | Status: SHIPPED | OUTPATIENT
Start: 2023-11-14 | End: 2023-11-14 | Stop reason: SDUPTHER

## 2023-11-14 RX ORDER — PROPOFOL 10 MG/ML
INJECTION, EMULSION INTRAVENOUS AS NEEDED
Status: DISCONTINUED | OUTPATIENT
Start: 2023-11-14 | End: 2023-11-14

## 2023-11-14 RX ORDER — CIPROFLOXACIN 2 MG/ML
400 INJECTION, SOLUTION INTRAVENOUS ONCE
Status: COMPLETED | OUTPATIENT
Start: 2023-11-14 | End: 2023-11-14

## 2023-11-14 RX ORDER — CIPROFLOXACIN 500 MG/1
500 TABLET, FILM COATED ORAL EVERY 12 HOURS SCHEDULED
Qty: 6 TABLET | Refills: 0 | Status: SHIPPED | OUTPATIENT
Start: 2023-11-14 | End: 2023-11-17

## 2023-11-14 RX ORDER — LABETALOL HYDROCHLORIDE 5 MG/ML
INJECTION, SOLUTION INTRAVENOUS AS NEEDED
Status: DISCONTINUED | OUTPATIENT
Start: 2023-11-14 | End: 2023-11-14

## 2023-11-14 RX ORDER — LIDOCAINE HYDROCHLORIDE 20 MG/ML
INJECTION, SOLUTION EPIDURAL; INFILTRATION; INTRACAUDAL; PERINEURAL AS NEEDED
Status: DISCONTINUED | OUTPATIENT
Start: 2023-11-14 | End: 2023-11-14

## 2023-11-14 RX ORDER — SODIUM CHLORIDE 9 MG/ML
INJECTION, SOLUTION INTRAVENOUS CONTINUOUS PRN
Status: DISCONTINUED | OUTPATIENT
Start: 2023-11-14 | End: 2023-11-14

## 2023-11-14 RX ORDER — FENTANYL CITRATE 50 UG/ML
INJECTION, SOLUTION INTRAMUSCULAR; INTRAVENOUS AS NEEDED
Status: DISCONTINUED | OUTPATIENT
Start: 2023-11-14 | End: 2023-11-14

## 2023-11-14 RX ORDER — GLYCOPYRROLATE 0.2 MG/ML
INJECTION INTRAMUSCULAR; INTRAVENOUS AS NEEDED
Status: DISCONTINUED | OUTPATIENT
Start: 2023-11-14 | End: 2023-11-14

## 2023-11-14 RX ORDER — PROPOFOL 10 MG/ML
INJECTION, EMULSION INTRAVENOUS CONTINUOUS PRN
Status: DISCONTINUED | OUTPATIENT
Start: 2023-11-14 | End: 2023-11-14

## 2023-11-14 RX ADMIN — PROPOFOL 50 MG: 10 INJECTION, EMULSION INTRAVENOUS at 08:45

## 2023-11-14 RX ADMIN — SODIUM CHLORIDE: 0.9 INJECTION, SOLUTION INTRAVENOUS at 09:24

## 2023-11-14 RX ADMIN — FENTANYL CITRATE 25 MCG: 50 INJECTION INTRAMUSCULAR; INTRAVENOUS at 08:59

## 2023-11-14 RX ADMIN — FENTANYL CITRATE 50 MCG: 50 INJECTION INTRAMUSCULAR; INTRAVENOUS at 08:45

## 2023-11-14 RX ADMIN — SODIUM CHLORIDE: 0.9 INJECTION, SOLUTION INTRAVENOUS at 08:34

## 2023-11-14 RX ADMIN — PROPOFOL 150 MG: 10 INJECTION, EMULSION INTRAVENOUS at 08:44

## 2023-11-14 RX ADMIN — CIPROFLOXACIN: 2 INJECTION, SOLUTION INTRAVENOUS at 08:43

## 2023-11-14 RX ADMIN — LABETALOL HYDROCHLORIDE 10 MG: 5 INJECTION, SOLUTION INTRAVENOUS at 08:51

## 2023-11-14 RX ADMIN — LIDOCAINE HYDROCHLORIDE 100 MG: 20 INJECTION, SOLUTION EPIDURAL; INFILTRATION; INTRACAUDAL; PERINEURAL at 08:44

## 2023-11-14 RX ADMIN — GLYCOPYRROLATE 0.2 MG: 0.2 INJECTION, SOLUTION INTRAMUSCULAR; INTRAVENOUS at 08:50

## 2023-11-14 RX ADMIN — FENTANYL CITRATE 25 MCG: 50 INJECTION INTRAMUSCULAR; INTRAVENOUS at 08:50

## 2023-11-14 RX ADMIN — PROPOFOL 100 MCG/KG/MIN: 10 INJECTION, EMULSION INTRAVENOUS at 08:44

## 2023-11-14 NOTE — TELEPHONE ENCOUNTER
Pt calling in, she antibx cipro was [facility] but she would like it to [facility] to [facility]. Please sign medication.    Reason for Disposition  • Caller has medicine question only, adult not sick, and triager answers question    Protocols used: Medication Question Call-ADULT-[place]

## 2023-11-14 NOTE — ANESTHESIA PROCEDURE NOTES
Anesthesia Notable Event    Date/Time: 11/14/2023 3:01 PM    Patient location during procedure: PACU  Performed by: Renetta Phelan MD  Authorized by: Renetta Phelan MD

## 2023-11-14 NOTE — ANESTHESIA PREPROCEDURE EVALUATION
Procedure:  ENDOSCOPIC ULTRASOUND (UPPER)    Relevant Problems   CARDIO   (+) Mixed hyperlipidemia   (+) Primary hypertension      ENDO   (+) Type 2 diabetes mellitus with diabetic polyneuropathy, with long-term current use of insulin (HCC)      GI/HEPATIC   (+) Pancreatic cyst        Physical Exam    Airway    Mallampati score: I  TM Distance: >3 FB  Neck ROM: full     Dental    upper dentures    Cardiovascular  Cardiovascular exam normal    Pulmonary  Pulmonary exam normal     Other Findings        Anesthesia Plan  ASA Score- 3     Anesthesia Type- IV sedation with anesthesia with ASA Monitors. Additional Monitors:     Airway Plan:            Plan Factors-Exercise tolerance (METS): >4 METS. Chart reviewed. EKG reviewed. Existing labs reviewed. Patient summary reviewed. Patient is not a current smoker. Patient did not smoke on day of surgery. Obstructive sleep apnea risk education given perioperatively. Induction-     Postoperative Plan- Plan for postoperative opioid use. Informed Consent- Anesthetic plan and risks discussed with patient. I personally reviewed this patient with the CRNA. Discussed and agreed on the Anesthesia Plan with the CRNA. Iliana Enriquez

## 2023-11-14 NOTE — H&P
History and Physical - [facility]  Amarjit Arvizu 80 y.o. female MRN: [id number]                  HPI: Amarjit Arvizu is a 80y.o. year old female who presents for pancreatic cyst.      REVIEW OF SYSTEMS: Per the HPI, and otherwise unremarkable.     Historical Information   Past Medical History:   Diagnosis Date    Cancer ([address])     Diabetes mellitus ([address])     Diverticulitis of colon     Hypertension      Past Surgical History:   Procedure Laterality Date    CHOLECYSTECTOMY      COLONOSCOPY      GALLBLADDER SURGERY      HYSTERECTOMY      UMBILICAL HERNIA REPAIR       Social History   Social History     Substance and Sexual Activity   Alcohol Use Not Currently     Social History     Substance and Sexual Activity   Drug Use Yes    Types: Marijuana    Comment: every day medical marijuana     Social History     Tobacco Use   Smoking Status Former    Types: Cigarettes    Quit date: 2000    Years since quittin.9   Smokeless Tobacco Former     Family History   Problem Relation Age of Onset    Heart disease Mother     Cancer Father     Hypertension Father     Cancer Sister     Mental illness Sister     Cancer Paternal Aunt     Diabetes Brother        Meds/Allergies       Current Outpatient Medications:     atorvastatin (LIPITOR) 40 mg tablet    DULoxetine (CYMBALTA) 60 mg delayed release capsule    fexofenadine (ALLEGRA) 180 MG tablet    furosemide (LASIX) 40 mg tablet    gabapentin (NEURONTIN) 300 mg capsule    glipiZIDE (GLUCOTROL XL) 5 mg 24 hr tablet    hydrochlorothiazide (HYDRODIURIL) 25 mg tablet    insulin glargine (Toujeo Max SoloStar) 300 units/mL CONCENTRATED U-300 injection pen (2-unit dial)    ondansetron (ZOFRAN-ODT) 4 mg disintegrating tablet    potassium chloride (K-DUR,KLOR-CON) 20 mEq tablet    Current Facility-Administered Medications:     ciprofloxacin (CIPRO) IVPB (premix in 5% dextrose) 400 mg 200 mL, 400 mg, Intravenous, Once    Allergies   Allergen Reactions Penicillins Throat Swelling and Tongue Swelling    Pollen Extract Abdominal Pain       Objective     There were no vitals taken for this visit. PHYSICAL EXAM    Gen: NAD  Head: NCAT  CV: RRR  CHEST: Clear  ABD: soft, NT/ND  EXT: no edema      ASSESSMENT/PLAN:  This is a 80y.o. year old female here for EUS possible FNA, and she is stable and optimized for her procedure.

## 2023-11-14 NOTE — DISCHARGE INSTRUCTIONS
Upper Endoscopic Gastrointestinal Ultrasonography   WHAT YOU NEED TO KNOW:   An upper gastrointestinal endoscopic ultrasound is done to look at the different parts of your upper gastrointestinal (GI) tract. The upper GI tract includes the esophagus, stomach, and duodenum (first part of the small intestine). This procedure is used to help diagnose and treat diseases that affect the upper GI tract. DISCHARGE INSTRUCTIONS:   Seek care immediately if:   You have sudden, severe abdominal pain. You have problems swallowing. You have a large amount of black, sticky bowel movements or blood in your bowel movements. You have sudden trouble breathing. You feel weak, lightheaded, or faint or your heart beats faster than normal for you. Contact your healthcare provider if:   You have a fever and chills. You have nausea or are vomiting. Your abdomen is bloated or feels full and hard. You have abdominal pain. You have a large amount of black, sticky bowel movements or blood in your bowel movements. You have not had a bowel movement for 3 days after your procedure. You have rash or hives. You lose your appetite, your skin feels itchy, and your skin turns yellow. You have questions or concerns about your procedure. Self-care:   ·      Rest when you feel it is needed. You may be drowsy for up to 24 hours after your procedure. Return to your daily activities as directed. ·       Ask when you can eat regular foods. Healthy foods include fruits, vegetables, whole-grain breads, low-fat dairy products, beans, lean meats, and fish. ·       Relieve a sore throat with ice chips, liquids, or lozenges as directed. Follow up with your healthcare provider as directed: Write down your questions so you remember to ask them during your visits.       If you take a “blood thinner”, please review the specific instructions from your endoscopist about when you should resume it. These can be found in the “Recommendation” and “Your Medication list” sections of this After Visit Summary.

## 2023-11-14 NOTE — TELEPHONE ENCOUNTER
Pt calling in, she is asking for cipro 500 mg BID for 3 days as recommended after EUS today to be sent to [facility] pharmacy   Reason for Disposition  • Caller has medicine question only, adult not sick, and triager answers question    Protocols used: Medication Question Call-ADULT-[place]

## 2023-11-20 ENCOUNTER — PREP FOR PROCEDURE (OUTPATIENT)
Dept: GASTROENTEROLOGY | Facility: CLINIC | Age: 68
End: 2023-11-20

## 2023-11-20 DIAGNOSIS — K22.70 BARRETT'S ESOPHAGUS WITHOUT DYSPLASIA: Primary | ICD-10-CM

## 2023-11-20 DIAGNOSIS — K31.7 GASTRIC POLYP: Primary | ICD-10-CM

## 2023-11-20 PROCEDURE — 88360 TUMOR IMMUNOHISTOCHEM/MANUAL: CPT | Performed by: PATHOLOGY

## 2023-11-20 PROCEDURE — 88305 TISSUE EXAM BY PATHOLOGIST: CPT | Performed by: PATHOLOGY

## 2023-11-20 PROCEDURE — 88342 IMHCHEM/IMCYTCHM 1ST ANTB: CPT | Performed by: PATHOLOGY

## 2023-11-20 RX ORDER — OMEPRAZOLE 40 MG/1
40 CAPSULE, DELAYED RELEASE ORAL DAILY
Qty: 30 CAPSULE | Refills: 0 | Status: SHIPPED | OUTPATIENT
Start: 2023-11-20 | End: 2023-12-20

## 2023-11-20 NOTE — RESULT ENCOUNTER NOTE
[employer] message sent. Recall for EGD in 3-6 months for hyperplastic polyp surveillance. 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

## 2023-11-21 ENCOUNTER — TELEPHONE (OUTPATIENT)
Dept: GASTROENTEROLOGY | Facility: CLINIC | Age: 68
End: 2023-11-21

## 2023-11-21 NOTE — TELEPHONE ENCOUNTER
Patients GI provider:  Dr. Carmelo Mars    Number to return call: [phone]    Reason for call: Pt calling for pathology results.     Scheduled procedure/appointment date if applicable: none

## 2023-11-22 ENCOUNTER — DOCUMENTATION (OUTPATIENT)
Dept: GASTROENTEROLOGY | Facility: MEDICAL CENTER | Age: 68
End: 2023-11-22

## 2023-11-22 ENCOUNTER — TELEPHONE (OUTPATIENT)
Dept: GASTROENTEROLOGY | Facility: CLINIC | Age: 68
End: 2023-11-22

## 2023-11-22 DIAGNOSIS — K86.2 PANCREATIC CYST: Primary | ICD-10-CM

## 2023-11-22 NOTE — PROGRESS NOTES
This is a follow-up of the endoscopic ultrasound and cyst aspiration. The following are the results of the cyst chemistry, cytology and molecular analyses if applicable. EUS Date : 11/14/23    EUS Findings : Barretts. 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 Small mural nodule (possible). Plan :   Surgical oncology eval  MRI in 6 months   PPI  EGD in 3 months. Discussed with patient.

## 2023-11-22 NOTE — TELEPHONE ENCOUNTER
----- Message from Steven August MD sent at 11/22/2023 12:16 PM EST -----  Inform patient via [address] [place] [address]. Please review the pathology/lab result of further discussion. Copied from [address] message :       German Hauser,     As we discuss. You will get a call to schedule the appointment with the surgeon, get endoscopy in 3 months and MRI in 6 months.      Best regards,     Steven August MD

## 2023-11-24 ENCOUNTER — TELEPHONE (OUTPATIENT)
Dept: HEMATOLOGY ONCOLOGY | Facility: CLINIC | Age: 68
End: 2023-11-24

## 2023-11-24 NOTE — TELEPHONE ENCOUNTER
I called [place] [facility] in response to a referral that was received for patient to establish care with Surgical Oncology. Outreach was made to schedule a consultation. I left a voicemail explaining the reason for my call and advised patient to call [facility] at [phone]. Another attempt will be made to contact patient.

## 2023-11-27 ENCOUNTER — TELEPHONE (OUTPATIENT)
Dept: HEMATOLOGY ONCOLOGY | Facility: CLINIC | Age: 68
End: 2023-11-27

## 2023-11-27 NOTE — TELEPHONE ENCOUNTER
I called [facility] in response to a referral that was received for patient to establish care with Surgical Oncology. Outreach was made to schedule a consultation. Patient declined scheduling. at this time advise that when she is ready she will call back to schedule an appointment. The referral has been closed.

## 2023-11-30 ENCOUNTER — RA CDI HCC (OUTPATIENT)
Dept: OTHER | Facility: HOSPITAL | Age: 68
End: 2023-11-30

## 2023-12-07 ENCOUNTER — OFFICE VISIT (OUTPATIENT)
Age: 68
End: 2023-12-07
Payer: MEDICARE

## 2023-12-07 ENCOUNTER — TELEPHONE (OUTPATIENT)
Age: 68
End: 2023-12-07

## 2023-12-07 VITALS
BODY MASS INDEX: 45.76 KG/M2 | TEMPERATURE: 97.5 F | DIASTOLIC BLOOD PRESSURE: 72 MMHG | RESPIRATION RATE: 18 BRPM | WEIGHT: 250.2 LBS | HEART RATE: 57 BPM | SYSTOLIC BLOOD PRESSURE: 110 MMHG | OXYGEN SATURATION: 98 %

## 2023-12-07 DIAGNOSIS — E11.42 TYPE 2 DIABETES MELLITUS WITH DIABETIC POLYNEUROPATHY, WITH LONG-TERM CURRENT USE OF INSULIN (HCC): Primary | ICD-10-CM

## 2023-12-07 DIAGNOSIS — Z79.4 TYPE 2 DIABETES MELLITUS WITH DIABETIC POLYNEUROPATHY, WITH LONG-TERM CURRENT USE OF INSULIN (HCC): Primary | ICD-10-CM

## 2023-12-07 DIAGNOSIS — K22.70 BARRETT'S ESOPHAGUS WITHOUT DYSPLASIA: ICD-10-CM

## 2023-12-07 DIAGNOSIS — N39.45 URINARY INCONTINENCE WITH CONTINUOUS LEAKAGE: ICD-10-CM

## 2023-12-07 DIAGNOSIS — I10 PRIMARY HYPERTENSION: ICD-10-CM

## 2023-12-07 DIAGNOSIS — Z23 ENCOUNTER FOR IMMUNIZATION: ICD-10-CM

## 2023-12-07 LAB
LEFT EYE DIABETIC RETINOPATHY: ABNORMAL
LEFT EYE IMAGE QUALITY: ABNORMAL
LEFT EYE MACULAR EDEMA: POSITIVE
LEFT EYE OTHER RETINOPATHY: ABNORMAL
RIGHT EYE DIABETIC RETINOPATHY: ABNORMAL
RIGHT EYE IMAGE QUALITY: ABNORMAL
RIGHT EYE MACULAR EDEMA: ABNORMAL
RIGHT EYE OTHER RETINOPATHY: ABNORMAL
SEVERITY (EYE EXAM): ABNORMAL

## 2023-12-07 PROCEDURE — 90662 IIV NO PRSV INCREASED AG IM: CPT | Performed by: FAMILY MEDICINE

## 2023-12-07 PROCEDURE — 92250 FUNDUS PHOTOGRAPHY W/I&R: CPT | Performed by: FAMILY MEDICINE

## 2023-12-07 PROCEDURE — G0008 ADMIN INFLUENZA VIRUS VAC: HCPCS | Performed by: FAMILY MEDICINE

## 2023-12-07 PROCEDURE — 99214 OFFICE O/P EST MOD 30 MIN: CPT | Performed by: FAMILY MEDICINE

## 2023-12-07 NOTE — TELEPHONE ENCOUNTER
----- Message from Sapna Mireles DO sent at 12/7/2023  1:43 PM EST -----  There is some evidence on the diabetes eye screening of changes in the retinas that are likely due to diabetes.   I recommend following up with an ophthalmologist soon

## 2023-12-10 DIAGNOSIS — K22.70 BARRETT'S ESOPHAGUS WITHOUT DYSPLASIA: ICD-10-CM

## 2023-12-11 ENCOUNTER — TELEPHONE (OUTPATIENT)
Dept: GASTROENTEROLOGY | Facility: MEDICAL CENTER | Age: 68
End: 2023-12-11

## 2023-12-11 RX ORDER — OMEPRAZOLE 40 MG/1
40 CAPSULE, DELAYED RELEASE ORAL DAILY
Qty: 30 CAPSULE | Refills: 5 | Status: SHIPPED | OUTPATIENT
Start: 2023-12-11

## 2023-12-11 NOTE — TELEPHONE ENCOUNTER
----- Message from Hallie Lo MD sent at 12/8/2023 11:43 AM EST -----  Put her on a [facility]ellation list. Thanks.     ----- Message -----  From: Frandy Aguilar  Sent: 12/8/2023  10:42 AM EST  To: Hallie Lo MD    Please let me know what day and time. Currently no openings for ov. Thank you. Ira Rutledge  ----- Message -----  From: Hallie Lo MD  Sent: 12/8/2023   8:55 AM EST  To: [facility]    Please schedule with me in office in 1 - 2 months and follow up with EGD in 3 months. Thank you.    Tamela Williamson

## 2023-12-11 NOTE — TELEPHONE ENCOUNTER
Scheduled repeat EGD in 3 months as requested. Pt requested to schedule for April. Pt states she currently does not need to see you in office due to having discussed everything over the phone recently. Pt was made aware to hold Trulicity for 7 days prior.

## 2023-12-12 NOTE — ANESTHESIA POSTPROCEDURE EVALUATION
Post-Op Assessment Note    CV Status:  Stable  Pain Score: 0    Pain management: adequate    Multimodal analgesia used between 6 hours prior to anesthesia start to PACU discharge    Mental Status:  Alert, awake and agitated   Hydration Status:  Euvolemic   PONV Controlled:  Controlled   Airway Patency:  Patent     Post Op Vitals Reviewed: Yes    No anethesia notable event occurred.     Staff: CRNA, Anesthesiologist       Patient tearful in pacu        BP   139/77   Temp 96.8   Pulse 68   Resp 18   SpO2 95%
2022

## 2023-12-14 DIAGNOSIS — E11.42 TYPE 2 DIABETES MELLITUS WITH DIABETIC POLYNEUROPATHY, WITH LONG-TERM CURRENT USE OF INSULIN (HCC): ICD-10-CM

## 2023-12-14 DIAGNOSIS — Z79.4 TYPE 2 DIABETES MELLITUS WITH DIABETIC POLYNEUROPATHY, WITH LONG-TERM CURRENT USE OF INSULIN (HCC): ICD-10-CM

## 2023-12-14 RX ORDER — GLIPIZIDE 5 MG/1
5 TABLET, FILM COATED, EXTENDED RELEASE ORAL DAILY
Qty: 90 TABLET | Refills: 2 | Status: SHIPPED | OUTPATIENT
Start: 2023-12-14

## 2023-12-24 NOTE — PROGRESS NOTES
Name: Hattie Patton      : 1955      MRN: [id number]  Encounter Provider: Linda Patton DO  Encounter Date: 2023   Encounter department: [facility] PRIMARY CARE [place]    Assessment & Plan     1. Type 2 diabetes mellitus with diabetic polyneuropathy, with long-term current use of insulin ([facility])-well-controlled with A1c of 6.4.  Patient to continue basal insulin therapy 45 units q. nightly and glipizide 5 mg daily.  On atorvastatin 40.  Using Cymbalta and gabapentin to help with neuropathy.  -     IRIS Diabetic eye exam    2. Primary hypertension-much improved on furosemide, 40 mg daily and hydrochlorothiazide 25 mg daily.  Will continue to monitor.  Repeat endoscopy scheduled for 3 months    3. Omalley's esophagus without dysplasia-etiology of patient's dysphagia and vomiting.  Has started PPI therapy and has seen improvement of her symptoms.  Patient to continue follow-up with GI.    4. Urinary incontinence with continuous leakage  -     Ambulatory Referral to Urogynecology; Future; Expected date: 2023    5. Encounter for immunization  -     influenza vaccine, high-dose, PF 0.7 mL (FLUZONE HIGH-DOSE)        Depression Screening and Follow-up Plan: Patient was screened for depression during today's encounter. They screened negative with a PHQ-2 score of 0.        Subjective      Presents for follow-up.  Discussed recent specialist visit.  Reviewed laboratory studies and special studies with patient.  Discussed diabetes regimen.      Review of Systems   Constitutional:  Negative for fatigue and fever.   HENT:  Positive for trouble swallowing (Improved).    Cardiovascular:  Negative for chest pain and leg swelling.   Genitourinary:  Positive for difficulty urinating.   Musculoskeletal:  Positive for arthralgias.       Current Outpatient Medications on File Prior to Visit   Medication Sig    atorvastatin (LIPITOR) 40 mg tablet TAKE 1 TABLET BY MOUTH ONCE  DAILY    DULoxetine  (CYMBALTA) 60 mg delayed release capsule TAKE 1 CAPSULE BY MOUTH DAILY    fexofenadine (ALLEGRA) 180 MG tablet Take 180 mg by mouth    furosemide (LASIX) 40 mg tablet Take 40 mg by mouth daily    gabapentin (NEURONTIN) 300 mg capsule Take 2 capsules (600 mg total) by mouth 3 (three) times a day    hydrochlorothiazide (HYDRODIURIL) 25 mg tablet take 1 tablet by mouth once daily    insulin glargine (Toujeo Max SoloStar) 300 units/mL CONCENTRATED U-300 injection pen (2-unit dial) Inject 45 Units under the skin daily at bedtime    ondansetron (ZOFRAN-ODT) 4 mg disintegrating tablet Take 1 tablet (4 mg total) by mouth every 6 (six) hours as needed for nausea or vomiting    potassium chloride (K-DUR,KLOR-CON) 20 mEq tablet Take 1 tablet (20 mEq total) by mouth daily       Objective     /72 (BP Location: Left arm, Patient Position: Sitting, Cuff Size: Large)   Pulse 57   Temp 97.5 °F (36.4 °C) (Temporal)   Resp 18   Wt 113 kg (250 lb 3.2 oz)   SpO2 98%   BMI 45.76 kg/m²     Physical Exam  Linda Patton DO

## 2023-12-27 DIAGNOSIS — E78.2 MIXED HYPERLIPIDEMIA: ICD-10-CM

## 2023-12-27 DIAGNOSIS — E11.42 TYPE 2 DIABETES MELLITUS WITH DIABETIC POLYNEUROPATHY, WITH LONG-TERM CURRENT USE OF INSULIN (HCC): ICD-10-CM

## 2023-12-27 DIAGNOSIS — M47.816 LUMBAR FACET ARTHROPATHY: ICD-10-CM

## 2023-12-27 DIAGNOSIS — Z79.4 TYPE 2 DIABETES MELLITUS WITH DIABETIC POLYNEUROPATHY, WITH LONG-TERM CURRENT USE OF INSULIN (HCC): ICD-10-CM

## 2023-12-27 RX ORDER — GABAPENTIN 300 MG/1
600 CAPSULE ORAL 3 TIMES DAILY
Qty: 540 CAPSULE | Refills: 2 | Status: SHIPPED | OUTPATIENT
Start: 2023-12-27

## 2023-12-27 RX ORDER — ATORVASTATIN CALCIUM 40 MG/1
40 TABLET, FILM COATED ORAL DAILY
Qty: 90 TABLET | Refills: 2 | Status: SHIPPED | OUTPATIENT
Start: 2023-12-27

## 2023-12-27 RX ORDER — DULOXETIN HYDROCHLORIDE 60 MG/1
CAPSULE, DELAYED RELEASE ORAL
Qty: 90 CAPSULE | Refills: 2 | Status: SHIPPED | OUTPATIENT
Start: 2023-12-27

## 2024-01-03 LAB
LEFT EYE DIABETIC RETINOPATHY: NORMAL
RIGHT EYE DIABETIC RETINOPATHY: NORMAL
SEVERITY (EYE EXAM): NORMAL

## 2024-01-19 DIAGNOSIS — Z12.31 ENCOUNTER FOR SCREENING MAMMOGRAM FOR MALIGNANT NEOPLASM OF BREAST: Primary | ICD-10-CM

## 2024-01-26 ENCOUNTER — TELEPHONE (OUTPATIENT)
Age: 69
End: 2024-01-26

## 2024-01-26 DIAGNOSIS — E11.42 TYPE 2 DIABETES MELLITUS WITH DIABETIC POLYNEUROPATHY, WITH LONG-TERM CURRENT USE OF INSULIN (HCC): Primary | ICD-10-CM

## 2024-01-26 DIAGNOSIS — Z79.4 TYPE 2 DIABETES MELLITUS WITH DIABETIC POLYNEUROPATHY, WITH LONG-TERM CURRENT USE OF INSULIN (HCC): Primary | ICD-10-CM

## 2024-01-26 NOTE — TELEPHONE ENCOUNTER
Received a call from [facility] they need notes that the patient has a freestyle sruthi 2 sensor so they can continue to give her the supply's for it.     You can reach them at phone [phone] or fax the notes to [phone]

## 2024-01-29 ENCOUNTER — VBI (OUTPATIENT)
Dept: ADMINISTRATIVE | Facility: OTHER | Age: 69
End: 2024-01-29

## 2024-02-02 NOTE — TELEPHONE ENCOUNTER
Freddie from [facility] wanted to know if you could add the freestyle sruthi 2 sensor  to her med list so they can continue to send her supply's. You can reach them at [phone]

## 2024-02-05 ENCOUNTER — TELEPHONE (OUTPATIENT)
Age: 69
End: 2024-02-05

## 2024-02-05 NOTE — TELEPHONE ENCOUNTER
[place] from [facility] [phone]     Needs the last office notes , diagnostic  for free style sruthi  and the script    Fax# [phone]..

## 2024-02-10 DIAGNOSIS — I10 PRIMARY HYPERTENSION: ICD-10-CM

## 2024-02-12 RX ORDER — HYDROCHLOROTHIAZIDE 25 MG/1
25 TABLET ORAL DAILY
Qty: 90 TABLET | Refills: 1 | Status: SHIPPED | OUTPATIENT
Start: 2024-02-12

## 2024-02-13 ENCOUNTER — APPOINTMENT (EMERGENCY)
Dept: RADIOLOGY | Facility: HOSPITAL | Age: 69
DRG: 189 | End: 2024-02-13
Payer: MEDICARE

## 2024-02-13 ENCOUNTER — HOSPITAL ENCOUNTER (INPATIENT)
Facility: HOSPITAL | Age: 69
LOS: 3 days | Discharge: HOME | DRG: 189 | End: 2024-02-16
Attending: EMERGENCY MEDICINE | Admitting: INTERNAL MEDICINE
Payer: MEDICARE

## 2024-02-13 DIAGNOSIS — R06.00 DYSPNEA: Primary | ICD-10-CM

## 2024-02-13 DIAGNOSIS — R09.02 HYPOXIA: ICD-10-CM

## 2024-02-13 DIAGNOSIS — R78.81 POSITIVE BLOOD CULTURE: ICD-10-CM

## 2024-02-13 PROBLEM — J96.01 ACUTE RESPIRATORY FAILURE WITH HYPOXIA (HCC): Status: ACTIVE | Noted: 2024-02-13

## 2024-02-13 PROBLEM — J44.1 COPD EXACERBATION (HCC): Status: ACTIVE | Noted: 2024-02-13

## 2024-02-13 PROBLEM — R65.10 SIRS (SYSTEMIC INFLAMMATORY RESPONSE SYNDROME) (HCC): Status: ACTIVE | Noted: 2024-02-13

## 2024-02-13 LAB
2HR DELTA HS TROPONIN: -5 NG/L
ALBUMIN SERPL BCP-MCNC: 4.4 G/DL (ref 3.5–5)
ALP SERPL-CCNC: 93 U/L (ref 34–104)
ALT SERPL W P-5'-P-CCNC: 19 U/L (ref 7–52)
ANION GAP SERPL CALCULATED.3IONS-SCNC: 10 MMOL/L
AST SERPL W P-5'-P-CCNC: 20 U/L (ref 13–39)
BASOPHILS # BLD AUTO: 0.05 THOUSANDS/ÂΜL (ref 0–0.1)
BASOPHILS NFR BLD AUTO: 1 % (ref 0–1)
BILIRUB SERPL-MCNC: 0.56 MG/DL (ref 0.2–1)
BUN SERPL-MCNC: 12 MG/DL (ref 5–25)
CALCIUM SERPL-MCNC: 9.7 MG/DL (ref 8.4–10.2)
CARDIAC TROPONIN I PNL SERPL HS: 3 NG/L
CARDIAC TROPONIN I PNL SERPL HS: 8 NG/L
CHLORIDE SERPL-SCNC: 106 MMOL/L (ref 96–108)
CO2 SERPL-SCNC: 27 MMOL/L (ref 21–32)
CREAT SERPL-MCNC: 0.88 MG/DL (ref 0.6–1.3)
EOSINOPHIL # BLD AUTO: 0.58 THOUSAND/ÂΜL (ref 0–0.61)
EOSINOPHIL NFR BLD AUTO: 7 % (ref 0–6)
ERYTHROCYTE [DISTWIDTH] IN BLOOD BY AUTOMATED COUNT: 14.4 % (ref 11.6–15.1)
FLUAV RNA RESP QL NAA+PROBE: NEGATIVE
FLUBV RNA RESP QL NAA+PROBE: NEGATIVE
GFR SERPL CREATININE-BSD FRML MDRD: 68 ML/MIN/1.73SQ M
GLUCOSE SERPL-MCNC: 121 MG/DL (ref 65–140)
GLUCOSE SERPL-MCNC: 266 MG/DL (ref 65–140)
HCT VFR BLD AUTO: 48.6 % (ref 34.8–46.1)
HGB BLD-MCNC: 15.4 G/DL (ref 11.5–15.4)
IMM GRANULOCYTES # BLD AUTO: 0.03 THOUSAND/UL (ref 0–0.2)
IMM GRANULOCYTES NFR BLD AUTO: 0 % (ref 0–2)
LYMPHOCYTES # BLD AUTO: 1.87 THOUSANDS/ÂΜL (ref 0.6–4.47)
LYMPHOCYTES NFR BLD AUTO: 22 % (ref 14–44)
MCH RBC QN AUTO: 27.3 PG (ref 26.8–34.3)
MCHC RBC AUTO-ENTMCNC: 31.7 G/DL (ref 31.4–37.4)
MCV RBC AUTO: 86 FL (ref 82–98)
MONOCYTES # BLD AUTO: 0.7 THOUSAND/ÂΜL (ref 0.17–1.22)
MONOCYTES NFR BLD AUTO: 8 % (ref 4–12)
NEUTROPHILS # BLD AUTO: 5.17 THOUSANDS/ÂΜL (ref 1.85–7.62)
NEUTS SEG NFR BLD AUTO: 62 % (ref 43–75)
NRBC BLD AUTO-RTO: 0 /100 WBCS
PLATELET # BLD AUTO: 252 THOUSANDS/UL (ref 149–390)
PMV BLD AUTO: 9.9 FL (ref 8.9–12.7)
POTASSIUM SERPL-SCNC: 3.6 MMOL/L (ref 3.5–5.3)
PROT SERPL-MCNC: 8 G/DL (ref 6.4–8.4)
RBC # BLD AUTO: 5.64 MILLION/UL (ref 3.81–5.12)
RSV RNA RESP QL NAA+PROBE: NEGATIVE
SARS-COV-2 RNA RESP QL NAA+PROBE: NEGATIVE
SODIUM SERPL-SCNC: 143 MMOL/L (ref 135–147)
WBC # BLD AUTO: 8.4 THOUSAND/UL (ref 4.31–10.16)

## 2024-02-13 PROCEDURE — 0241U HB NFCT DS VIR RESP RNA 4 TRGT: CPT | Performed by: EMERGENCY MEDICINE

## 2024-02-13 PROCEDURE — 87186 SC STD MICRODIL/AGAR DIL: CPT | Performed by: EMERGENCY MEDICINE

## 2024-02-13 PROCEDURE — 71045 X-RAY EXAM CHEST 1 VIEW: CPT

## 2024-02-13 PROCEDURE — 87040 BLOOD CULTURE FOR BACTERIA: CPT | Performed by: EMERGENCY MEDICINE

## 2024-02-13 PROCEDURE — 85025 COMPLETE CBC W/AUTO DIFF WBC: CPT | Performed by: EMERGENCY MEDICINE

## 2024-02-13 PROCEDURE — 87147 CULTURE TYPE IMMUNOLOGIC: CPT | Performed by: EMERGENCY MEDICINE

## 2024-02-13 PROCEDURE — 36415 COLL VENOUS BLD VENIPUNCTURE: CPT | Performed by: EMERGENCY MEDICINE

## 2024-02-13 PROCEDURE — 96365 THER/PROPH/DIAG IV INF INIT: CPT

## 2024-02-13 PROCEDURE — 87077 CULTURE AEROBIC IDENTIFY: CPT | Performed by: EMERGENCY MEDICINE

## 2024-02-13 PROCEDURE — 99222 1ST HOSP IP/OBS MODERATE 55: CPT | Performed by: INTERNAL MEDICINE

## 2024-02-13 PROCEDURE — 87154 CUL TYP ID BLD PTHGN 6+ TRGT: CPT | Performed by: EMERGENCY MEDICINE

## 2024-02-13 PROCEDURE — 99285 EMERGENCY DEPT VISIT HI MDM: CPT

## 2024-02-13 PROCEDURE — 82948 REAGENT STRIP/BLOOD GLUCOSE: CPT

## 2024-02-13 PROCEDURE — 99285 EMERGENCY DEPT VISIT HI MDM: CPT | Performed by: EMERGENCY MEDICINE

## 2024-02-13 PROCEDURE — 84484 ASSAY OF TROPONIN QUANT: CPT | Performed by: EMERGENCY MEDICINE

## 2024-02-13 PROCEDURE — 93005 ELECTROCARDIOGRAM TRACING: CPT

## 2024-02-13 PROCEDURE — 80053 COMPREHEN METABOLIC PANEL: CPT | Performed by: EMERGENCY MEDICINE

## 2024-02-13 RX ORDER — ENOXAPARIN SODIUM 100 MG/ML
40 INJECTION SUBCUTANEOUS DAILY
Status: DISCONTINUED | OUTPATIENT
Start: 2024-02-14 | End: 2024-02-16 | Stop reason: HOSPADM

## 2024-02-13 RX ORDER — LORATADINE 10 MG/1
10 TABLET ORAL DAILY
Status: DISCONTINUED | OUTPATIENT
Start: 2024-02-14 | End: 2024-02-16 | Stop reason: HOSPADM

## 2024-02-13 RX ORDER — GUAIFENESIN/DEXTROMETHORPHAN 100-10MG/5
10 SYRUP ORAL EVERY 4 HOURS PRN
Status: DISCONTINUED | OUTPATIENT
Start: 2024-02-13 | End: 2024-02-16 | Stop reason: HOSPADM

## 2024-02-13 RX ORDER — PREDNISONE 20 MG/1
40 TABLET ORAL ONCE
Status: COMPLETED | OUTPATIENT
Start: 2024-02-13 | End: 2024-02-13

## 2024-02-13 RX ORDER — PANTOPRAZOLE SODIUM 40 MG/1
40 TABLET, DELAYED RELEASE ORAL
Status: DISCONTINUED | OUTPATIENT
Start: 2024-02-14 | End: 2024-02-16 | Stop reason: HOSPADM

## 2024-02-13 RX ORDER — ATORVASTATIN CALCIUM 40 MG/1
40 TABLET, FILM COATED ORAL DAILY
Status: DISCONTINUED | OUTPATIENT
Start: 2024-02-14 | End: 2024-02-16 | Stop reason: HOSPADM

## 2024-02-13 RX ORDER — METHYLPREDNISOLONE SODIUM SUCCINATE 40 MG/ML
40 INJECTION, POWDER, LYOPHILIZED, FOR SOLUTION INTRAMUSCULAR; INTRAVENOUS EVERY 12 HOURS SCHEDULED
Status: DISCONTINUED | OUTPATIENT
Start: 2024-02-13 | End: 2024-02-16 | Stop reason: HOSPADM

## 2024-02-13 RX ORDER — MAGNESIUM SULFATE HEPTAHYDRATE 40 MG/ML
2 INJECTION, SOLUTION INTRAVENOUS ONCE
Status: COMPLETED | OUTPATIENT
Start: 2024-02-13 | End: 2024-02-13

## 2024-02-13 RX ORDER — ALBUTEROL SULFATE 2.5 MG/3ML
5 SOLUTION RESPIRATORY (INHALATION) ONCE
Status: COMPLETED | OUTPATIENT
Start: 2024-02-13 | End: 2024-02-13

## 2024-02-13 RX ORDER — GUAIFENESIN 600 MG/1
600 TABLET, EXTENDED RELEASE ORAL 2 TIMES DAILY
Status: DISCONTINUED | OUTPATIENT
Start: 2024-02-13 | End: 2024-02-16 | Stop reason: HOSPADM

## 2024-02-13 RX ORDER — IPRATROPIUM BROMIDE AND ALBUTEROL SULFATE 2.5; .5 MG/3ML; MG/3ML
3 SOLUTION RESPIRATORY (INHALATION)
Status: DISCONTINUED | OUTPATIENT
Start: 2024-02-13 | End: 2024-02-14

## 2024-02-13 RX ORDER — GABAPENTIN 300 MG/1
600 CAPSULE ORAL 3 TIMES DAILY
Status: DISCONTINUED | OUTPATIENT
Start: 2024-02-13 | End: 2024-02-16 | Stop reason: HOSPADM

## 2024-02-13 RX ORDER — INSULIN GLARGINE 100 [IU]/ML
45 INJECTION, SOLUTION SUBCUTANEOUS
Status: DISCONTINUED | OUTPATIENT
Start: 2024-02-13 | End: 2024-02-16 | Stop reason: HOSPADM

## 2024-02-13 RX ORDER — FUROSEMIDE 40 MG/1
40 TABLET ORAL DAILY
Status: DISCONTINUED | OUTPATIENT
Start: 2024-02-14 | End: 2024-02-16 | Stop reason: HOSPADM

## 2024-02-13 RX ORDER — AZITHROMYCIN 500 MG/1
500 TABLET, FILM COATED ORAL EVERY 24 HOURS
Status: COMPLETED | OUTPATIENT
Start: 2024-02-14 | End: 2024-02-16

## 2024-02-13 RX ORDER — ACETAMINOPHEN 325 MG/1
650 TABLET ORAL EVERY 6 HOURS PRN
Status: DISCONTINUED | OUTPATIENT
Start: 2024-02-13 | End: 2024-02-16 | Stop reason: HOSPADM

## 2024-02-13 RX ORDER — DULOXETIN HYDROCHLORIDE 60 MG/1
60 CAPSULE, DELAYED RELEASE ORAL DAILY
Status: DISCONTINUED | OUTPATIENT
Start: 2024-02-14 | End: 2024-02-16 | Stop reason: HOSPADM

## 2024-02-13 RX ADMIN — PREDNISONE 40 MG: 20 TABLET ORAL at 17:01

## 2024-02-13 RX ADMIN — DOXYCYCLINE 100 MG: 100 INJECTION, POWDER, LYOPHILIZED, FOR SOLUTION INTRAVENOUS at 21:53

## 2024-02-13 RX ADMIN — MAGNESIUM SULFATE HEPTAHYDRATE 2 G: 40 INJECTION, SOLUTION INTRAVENOUS at 20:01

## 2024-02-13 RX ADMIN — INSULIN GLARGINE 45 UNITS: 100 INJECTION, SOLUTION SUBCUTANEOUS at 23:39

## 2024-02-13 RX ADMIN — GUAIFENESIN 600 MG: 600 TABLET ORAL at 21:31

## 2024-02-13 RX ADMIN — IPRATROPIUM BROMIDE 0.5 MG: 0.5 SOLUTION RESPIRATORY (INHALATION) at 17:02

## 2024-02-13 RX ADMIN — IPRATROPIUM BROMIDE 0.5 MG: 0.5 SOLUTION RESPIRATORY (INHALATION) at 17:59

## 2024-02-13 RX ADMIN — METHYLPREDNISOLONE SODIUM SUCCINATE 40 MG: 40 INJECTION, POWDER, FOR SOLUTION INTRAMUSCULAR; INTRAVENOUS at 21:31

## 2024-02-13 RX ADMIN — ALBUTEROL SULFATE 5 MG: 2.5 SOLUTION RESPIRATORY (INHALATION) at 17:01

## 2024-02-13 RX ADMIN — ALBUTEROL SULFATE 5 MG: 2.5 SOLUTION RESPIRATORY (INHALATION) at 17:59

## 2024-02-14 LAB
ANION GAP SERPL CALCULATED.3IONS-SCNC: 8 MMOL/L
BUN SERPL-MCNC: 17 MG/DL (ref 5–25)
CALCIUM SERPL-MCNC: 9.2 MG/DL (ref 8.4–10.2)
CHLORIDE SERPL-SCNC: 105 MMOL/L (ref 96–108)
CO2 SERPL-SCNC: 27 MMOL/L (ref 21–32)
CREAT SERPL-MCNC: 0.74 MG/DL (ref 0.6–1.3)
ERYTHROCYTE [DISTWIDTH] IN BLOOD BY AUTOMATED COUNT: 14.5 % (ref 11.6–15.1)
GFR SERPL CREATININE-BSD FRML MDRD: 84 ML/MIN/1.73SQ M
GLUCOSE SERPL-MCNC: 218 MG/DL (ref 65–140)
GLUCOSE SERPL-MCNC: 283 MG/DL (ref 65–140)
HCT VFR BLD AUTO: 42 % (ref 34.8–46.1)
HGB BLD-MCNC: 13.4 G/DL (ref 11.5–15.4)
MCH RBC QN AUTO: 27.4 PG (ref 26.8–34.3)
MCHC RBC AUTO-ENTMCNC: 31.9 G/DL (ref 31.4–37.4)
MCV RBC AUTO: 86 FL (ref 82–98)
PLATELET # BLD AUTO: 218 THOUSANDS/UL (ref 149–390)
PMV BLD AUTO: 10.1 FL (ref 8.9–12.7)
POTASSIUM SERPL-SCNC: 4.2 MMOL/L (ref 3.5–5.3)
PROCALCITONIN SERPL-MCNC: 0.07 NG/ML
RBC # BLD AUTO: 4.89 MILLION/UL (ref 3.81–5.12)
SODIUM SERPL-SCNC: 140 MMOL/L (ref 135–147)
WBC # BLD AUTO: 4.03 THOUSAND/UL (ref 4.31–10.16)

## 2024-02-14 PROCEDURE — 84145 PROCALCITONIN (PCT): CPT | Performed by: STUDENT IN AN ORGANIZED HEALTH CARE EDUCATION/TRAINING PROGRAM

## 2024-02-14 PROCEDURE — 82948 REAGENT STRIP/BLOOD GLUCOSE: CPT

## 2024-02-14 PROCEDURE — 85027 COMPLETE CBC AUTOMATED: CPT | Performed by: INTERNAL MEDICINE

## 2024-02-14 PROCEDURE — 36415 COLL VENOUS BLD VENIPUNCTURE: CPT | Performed by: INTERNAL MEDICINE

## 2024-02-14 PROCEDURE — 94760 N-INVAS EAR/PLS OXIMETRY 1: CPT

## 2024-02-14 PROCEDURE — 94640 AIRWAY INHALATION TREATMENT: CPT

## 2024-02-14 PROCEDURE — 80048 BASIC METABOLIC PNL TOTAL CA: CPT | Performed by: INTERNAL MEDICINE

## 2024-02-14 PROCEDURE — 99232 SBSQ HOSP IP/OBS MODERATE 35: CPT | Performed by: STUDENT IN AN ORGANIZED HEALTH CARE EDUCATION/TRAINING PROGRAM

## 2024-02-14 PROCEDURE — 94664 DEMO&/EVAL PT USE INHALER: CPT

## 2024-02-14 RX ORDER — LEVALBUTEROL INHALATION SOLUTION 1.25 MG/3ML
1.25 SOLUTION RESPIRATORY (INHALATION)
Status: DISCONTINUED | OUTPATIENT
Start: 2024-02-14 | End: 2024-02-16 | Stop reason: HOSPADM

## 2024-02-14 RX ORDER — ALBUTEROL SULFATE 90 UG/1
2 AEROSOL, METERED RESPIRATORY (INHALATION) EVERY 4 HOURS PRN
Status: DISCONTINUED | OUTPATIENT
Start: 2024-02-14 | End: 2024-02-16 | Stop reason: HOSPADM

## 2024-02-14 RX ORDER — KETOROLAC TROMETHAMINE 30 MG/ML
15 INJECTION, SOLUTION INTRAMUSCULAR; INTRAVENOUS ONCE
Status: COMPLETED | OUTPATIENT
Start: 2024-02-14 | End: 2024-02-14

## 2024-02-14 RX ADMIN — GUAIFENESIN 600 MG: 600 TABLET ORAL at 09:19

## 2024-02-14 RX ADMIN — LEVALBUTEROL HYDROCHLORIDE 1.25 MG: 1.25 SOLUTION RESPIRATORY (INHALATION) at 13:28

## 2024-02-14 RX ADMIN — METHYLPREDNISOLONE SODIUM SUCCINATE 40 MG: 40 INJECTION, POWDER, FOR SOLUTION INTRAMUSCULAR; INTRAVENOUS at 09:23

## 2024-02-14 RX ADMIN — ENOXAPARIN SODIUM 40 MG: 40 INJECTION SUBCUTANEOUS at 09:20

## 2024-02-14 RX ADMIN — FUROSEMIDE 40 MG: 40 TABLET ORAL at 09:19

## 2024-02-14 RX ADMIN — ATORVASTATIN CALCIUM 40 MG: 40 TABLET, FILM COATED ORAL at 09:19

## 2024-02-14 RX ADMIN — IPRATROPIUM BROMIDE AND ALBUTEROL SULFATE 3 ML: 2.5; .5 SOLUTION RESPIRATORY (INHALATION) at 01:00

## 2024-02-14 RX ADMIN — IPRATROPIUM BROMIDE 0.5 MG: 0.5 SOLUTION RESPIRATORY (INHALATION) at 19:53

## 2024-02-14 RX ADMIN — IPRATROPIUM BROMIDE 0.5 MG: 0.5 SOLUTION RESPIRATORY (INHALATION) at 13:28

## 2024-02-14 RX ADMIN — LORATADINE 10 MG: 10 TABLET ORAL at 09:18

## 2024-02-14 RX ADMIN — IPRATROPIUM BROMIDE 0.5 MG: 0.5 SOLUTION RESPIRATORY (INHALATION) at 07:39

## 2024-02-14 RX ADMIN — LEVALBUTEROL HYDROCHLORIDE 1.25 MG: 1.25 SOLUTION RESPIRATORY (INHALATION) at 19:53

## 2024-02-14 RX ADMIN — GUAIFENESIN 600 MG: 600 TABLET ORAL at 17:16

## 2024-02-14 RX ADMIN — INSULIN GLARGINE 45 UNITS: 100 INJECTION, SOLUTION SUBCUTANEOUS at 21:46

## 2024-02-14 RX ADMIN — DULOXETINE HYDROCHLORIDE 60 MG: 60 CAPSULE, DELAYED RELEASE ORAL at 09:19

## 2024-02-14 RX ADMIN — KETOROLAC TROMETHAMINE 15 MG: 30 INJECTION, SOLUTION INTRAMUSCULAR at 19:46

## 2024-02-14 RX ADMIN — GABAPENTIN 600 MG: 300 CAPSULE ORAL at 17:11

## 2024-02-14 RX ADMIN — LEVALBUTEROL HYDROCHLORIDE 1.25 MG: 1.25 SOLUTION RESPIRATORY (INHALATION) at 07:39

## 2024-02-14 RX ADMIN — METHYLPREDNISOLONE SODIUM SUCCINATE 40 MG: 40 INJECTION, POWDER, FOR SOLUTION INTRAMUSCULAR; INTRAVENOUS at 21:46

## 2024-02-14 RX ADMIN — GABAPENTIN 600 MG: 300 CAPSULE ORAL at 21:46

## 2024-02-14 RX ADMIN — AZITHROMYCIN 500 MG: 500 TABLET, FILM COATED ORAL at 09:19

## 2024-02-14 RX ADMIN — GABAPENTIN 600 MG: 300 CAPSULE ORAL at 09:18

## 2024-02-14 RX ADMIN — PANTOPRAZOLE SODIUM 40 MG: 40 TABLET, DELAYED RELEASE ORAL at 06:00

## 2024-02-14 RX ADMIN — ACETAMINOPHEN 650 MG: 325 TABLET, FILM COATED ORAL at 17:12

## 2024-02-14 NOTE — ASSESSMENT & PLAN NOTE
Met criteria with tachypnea, tachycardia secondary to acute bronchitis  No other identified source of infection

## 2024-02-14 NOTE — PROGRESS NOTES
[facility]  Progress Note  Name: Hattie Patton I  MRN: [id number]  Unit/Bed#: ED 28 I Date of Admission: 2/13/2024   Date of Service: 2/14/2024 I Hospital Day: 1    Assessment/Plan   SIRS (systemic inflammatory response syndrome) (HCC)  Assessment & Plan  Met criteria with tachypnea, tachycardia secondary to acute bronchitis  No suspected source of bacterial infectioin    Type 2 diabetes mellitus with diabetic polyneuropathy, with long-term current use of insulin (HCC)  Assessment & Plan  Lab Results   Component Value Date    HGBA1C 6.4 06/23/2023       Recent Labs     02/13/24  2335   POCGLU 266*       Blood Sugar Average: Last 72 hrs:  (P) 266Resume on insulin regimen  ISS      Primary hypertension  Assessment & Plan  Stable  Cont meds; not sure why on both HCTZ and lasix    Obesity, morbid (HCC)  Assessment & Plan  Lifestyle/dietary modification    * Acute respiratory failure with hypoxia (HCC)  Assessment & Plan  Secondary to acute viral bronchitis  Pt with no formal dx of obstructive disease but has previous tobacco use though quit since 2000  Continue IV steroids, pulm toilet, azithromycin   CXR negative, procalcitonin negative.   Neg COVID/Flu/RSV  Cont O2 supplementation and wean as tolerated  Referral to pulmonary as outpt for PFT             VTE Pharmacologic Prophylaxis: VTE Score: 6 High Risk (Score >/= 5) - Pharmacological DVT Prophylaxis Ordered: enoxaparin (Lovenox). Sequential Compression Devices Ordered.    Mobility:      Not yet assessed    Patient Centered Rounds: I performed bedside rounds with nursing staff today.   Discussions with Specialists or Other Care Team Provider: None    Education and Discussions with Family / Patient: Updated  () at bedside.    Total Time Spent on Date of Encounter in care of patient: 40 mins. This time was spent on one or more of the following: performing physical exam; counseling and coordination of care; obtaining or  reviewing history; documenting in the medical record; reviewing/ordering tests, medications or procedures; communicating with other healthcare professionals and discussing with patient's family/caregivers.    Current Length of Stay: 1 day(s)  Current Patient Status: Inpatient   Certification Statement: The patient will continue to require additional inpatient hospital stay due to acute hypoxic respiratory failure   Discharge Plan: Anticipate discharge in 24-48 hrs to home.    Code Status: Level 1 - Full Code    Subjective:   Patient reports her breathing is a little better today. Still coughing. Still requiring supplemental oxygen.    Objective:     Vitals:   Temp (24hrs), Av °F (36.7 °C), Min:98 °F (36.7 °C), Max:98 °F (36.7 °C)    Temp:  [98 °F (36.7 °C)] 98 °F (36.7 °C)  HR:  [] 75  Resp:  [17-28] 17  BP: (129-222)/() 154/68  SpO2:  [89 %-98 %] 96 %  Body mass index is 45.73 kg/m².     Input and Output Summary (last 24 hours):     Intake/Output Summary (Last 24 hours) at 2024 1239  Last data filed at 2024 2325  Gross per 24 hour   Intake 100 ml   Output --   Net 100 ml       Physical Exam:   Physical Exam  Vitals and nursing note reviewed.   Constitutional:       Appearance: Normal appearance.   HENT:      Head: Normocephalic and atraumatic.      Mouth/Throat:      Mouth: Mucous membranes are moist.   Eyes:      Conjunctiva/sclera: Conjunctivae normal.      Pupils: Pupils are equal, round, and reactive to light.   Cardiovascular:      Rate and Rhythm: Normal rate and regular rhythm.      Pulses: Normal pulses.      Heart sounds: Normal heart sounds. No murmur heard.     No gallop.   Pulmonary:      Effort: Pulmonary effort is normal. No respiratory distress.      Breath sounds: No rales.      Comments: End expiratory wheezing  Abdominal:      General: Abdomen is flat. Bowel sounds are normal. There is no distension.      Palpations: Abdomen is soft.      Tenderness: There is no abdominal  tenderness. There is no guarding or rebound.   Musculoskeletal:         General: No swelling. Normal range of motion.   Skin:     General: Skin is warm and dry.      Capillary Refill: Capillary refill takes less than 2 seconds.   Neurological:      General: No focal deficit present.      Mental Status: She is alert. Mental status is at baseline.   Psychiatric:         Mood and Affect: Mood normal.         Additional Data:     Labs:  Results from last 7 days   Lab Units 02/14/24  0640 02/13/24  1644   WBC Thousand/uL 4.03* 8.40   HEMOGLOBIN g/dL 13.4 15.4   HEMATOCRIT % 42.0 48.6*   PLATELETS Thousands/uL 218 252   NEUTROS PCT %  --  62   LYMPHS PCT %  --  22   MONOS PCT %  --  8   EOS PCT %  --  7*     Results from last 7 days   Lab Units 02/14/24  0640 02/13/24  1644   SODIUM mmol/L 140 143   POTASSIUM mmol/L 4.2 3.6   CHLORIDE mmol/L 105 106   CO2 mmol/L 27 27   BUN mg/dL 17 12   CREATININE mg/dL 0.74 0.88   ANION GAP mmol/L 8 10   CALCIUM mg/dL 9.2 9.7   ALBUMIN g/dL  --  4.4   TOTAL BILIRUBIN mg/dL  --  0.56   ALK PHOS U/L  --  93   ALT U/L  --  19   AST U/L  --  20   GLUCOSE RANDOM mg/dL 283* 121         Results from last 7 days   Lab Units 02/13/24  2335   POC GLUCOSE mg/dl 266*         Results from last 7 days   Lab Units 02/14/24  0640   PROCALCITONIN ng/ml 0.07       Lines/Drains:  Invasive Devices       Peripheral Intravenous Line  Duration             Peripheral IV 02/13/24 Left Antecubital <1 day                          Imaging: Reviewed radiology reports from this admission including: chest xray    Recent Cultures (last 7 days):   Results from last 7 days   Lab Units 02/13/24  2132 02/13/24  2031   BLOOD CULTURE  Received in Microbiology Lab. Culture in Progress. Received in Microbiology Lab. Culture in Progress.       Last 24 Hours Medication List:   Current Facility-Administered Medications   Medication Dose Route Frequency Provider Last Rate    acetaminophen  650 mg Oral Q6H PRN Princess Conde  Tyson, DO      albuterol  2 puff Inhalation Q4H PRN Manuel Viktor, DO      atorvastatin  40 mg Oral Daily Princess Amaya Mehta, DO      azithromycin  500 mg Oral Q24H Princess Amaya Mehta, DO      dextromethorphan-guaiFENesin  10 mL Oral Q4H PRN Princess Amaya Mehta, DO      DULoxetine  60 mg Oral Daily Princess Amaya Mehta, DO      enoxaparin  40 mg Subcutaneous Daily Princess Amaya Mehta, DO      furosemide  40 mg Oral Daily Princess Amaya Mehta, DO      gabapentin  600 mg Oral TID Princess Amaya Mehta, DO      guaiFENesin  600 mg Oral BID Princess Amaya Mehta, DO      insulin glargine  45 Units Subcutaneous HS Princess Amaya Mehta, DO      ipratropium  0.5 mg Nebulization TID [facility] Viktor, DO      levalbuterol  1.25 mg Nebulization TID [facility] Viktor, DO      loratadine  10 mg Oral Daily [facility] Amaya Mehta, DO      methylPREDNISolone sodium succinate  40 mg Intravenous Q12H KRISTINE Princess Amaya Mehta, DO      pantoprazole  40 mg Oral Early Morning [facility] Amaya Mehta, DO          Today, Patient Was Seen By: Raphael Kramer MD    **Please Note: This note may have been constructed using a voice recognition system.**

## 2024-02-14 NOTE — RESPIRATORY THERAPY NOTE
RT Protocol Note  Hattie Patton 68 y.o. female MRN: [id number]  Unit/Bed#: ED 28 Encounter: [id number]    Assessment    Principal Problem:    Acute respiratory failure with hypoxia (HCC)  Active Problems:    Obesity, morbid (HCC)    Primary hypertension    Type 2 diabetes mellitus with diabetic polyneuropathy, with long-term current use of insulin (HCC)    SIRS (systemic inflammatory response syndrome) (HCC)      Home Pulmonary Medications:         Past Medical History:   Diagnosis Date    Cancer (HCC)     Diabetes mellitus (HCC)     Diverticulitis of colon     Hypertension      Social History     Socioeconomic History    Marital status: /Civil Union     Spouse name: None    Number of children: None    Years of education: None    Highest education level: None   Occupational History    None   Tobacco Use    Smoking status: Former     Current packs/day: 0.00     Types: Cigarettes     Quit date: 2000     Years since quittin.2    Smokeless tobacco: Former   Vaping Use    Vaping status: Never Used   Substance and Sexual Activity    Alcohol use: Not Currently     Comment: socially - rare    Drug use: Yes     Frequency: 7.0 times per week     Types: Marijuana     Comment: every day medical marijuana, oil or smoke    Sexual activity: Not Currently     Partners: Male     Birth control/protection: None   Other Topics Concern    None   Social History Narrative    None     Social Determinants of Health     Financial Resource Strain: High Risk (10/16/2023)    Overall Financial Resource Strain (CARDIA)     Difficulty of Paying Living Expenses: Hard   Food Insecurity: Not on file   Transportation Needs: No Transportation Needs (10/16/2023)    PRAPARE - Transportation     Lack of Transportation (Medical): No     Lack of Transportation (Non-Medical): No   Physical Activity: Not on file   Stress: Stress Concern Present (10/28/2022)    [employer] [place] [employer] - Occupational Stress Questionnaire      "Feeling of Stress : Rather much   Social Connections: Not on file   Intimate Partner Violence: Not on file   Housing Stability: Not on file       Subjective         Objective    Physical Exam:        Vitals:  Blood pressure 154/68, pulse 75, temperature 98 °F (36.7 °C), temperature source Temporal, resp. rate 17, height 5' 2\" (1.575 m), weight 113 kg (250 lb), SpO2 95%.          Imaging and other studies: I have personally reviewed pertinent reports.      O2 Device: NC     Plan    Respiratory Plan: No distress/Pulmonary history        Resp Comments: will continue with tid xop/atr   "

## 2024-02-14 NOTE — ASSESSMENT & PLAN NOTE
COVID/flu/RSV negative  No consolidation on cxr; f/u official cxr results. But will place on azithromycin for anti-inflammatory effect  Place on IV steroids, pulm toilet  Cont supportive care

## 2024-02-14 NOTE — ASSESSMENT & PLAN NOTE
Lab Results   Component Value Date    HGBA1C 6.4 06/23/2023       Recent Labs     02/13/24  2335   POCGLU 266*       Blood Sugar Average: Last 72 hrs:  (P) 266Resume on insulin regimen  ISS

## 2024-02-14 NOTE — H&P
"[facility]  H&P  Name: Hattie Patton 68 y.o. female I MRN: [id number]  Unit/Bed#: ED 28 I Date of Admission: 2/13/2024   Date of Service: 2/13/2024 I Hospital Day: 0      Assessment/Plan   * Acute respiratory failure with hypoxia (HCC)  Assessment & Plan  Secondary to acute bronchitis  Pt with no formal dx of obstructive disease but has previous tobacco use though quit since 2000  Place on IV steroids, pulm toilet, azithromycin   S/p CXR preliminary no consolidation. F/u official cxr results  Neg COVID/Flu/RSV  Cont O2 supplementation and wean as tolerated  Referral to pulmonary as outpt for PFT    SIRS (systemic inflammatory response syndrome) ([facility])  Assessment & Plan  Met criteria with tachypnea, tachycardia secondary to acute bronchitis  No other identified source of infection    Type 2 diabetes mellitus with diabetic polyneuropathy, with long-term current use of insulin ([facility])  Assessment & Plan  Lab Results   Component Value Date    HGBA1C 6.4 06/23/2023       No results for input(s): \"POCGLU\" in the last 72 hours.    Blood Sugar Average: Last 72 hrs:  Resume on insulin regimen  ISS      Primary hypertension  Assessment & Plan  Stable  Cont meds; not sure why on both HCTZ and lasix    Obesity, morbid (HCC)  Assessment & Plan  Lifestyle/dietary modification       VTE Prophylaxis: Enoxaparin (Lovenox)  / sequential compression device   Code Status: Full code  POLST: There is no POLST form on file for this patient (pre-hospital)  Discussion with family: Discussed with patient and her     Anticipated Length of Stay:  Patient will be admitted on an Inpatient basis with an anticipated length of stay of more than 2 midnights.   Justification for Hospital Stay: Acute respiratory failure with hypoxemia    Total Time for Visit, including Counseling / Coordination of Care: 60 minutes.  Greater than 50% of this total time spent on direct patient counseling and coordination of " care.    Chief Complaint:   Shortness of breath x 2 days    History of Present Illness:    Hattie Patton is a 68 y.o. female medical hx significant for IDDM, HTN, HL presents with SOB x 2 days. She reports of productive cough of yellowish sputum. She denies fever and chills. She has no formal dx of copd or asthma but reports of tobacco use though quit since 2000. She admits to intermittent use of marijuana.    Upon presentation to the ED, presenting pox of 89% on RA. She received multiple rounds of nebs txt, states she was is mildly improved. She remains on 2L NC.    Review of Systems:    Review of Systems   Respiratory:  Positive for cough, shortness of breath and wheezing.        Past Medical and Surgical History:     Past Medical History:   Diagnosis Date    Cancer (HCC)     Diabetes mellitus (HCC)     Diverticulitis of colon     Hypertension        Past Surgical History:   Procedure Laterality Date    CHOLECYSTECTOMY      COLONOSCOPY      GALLBLADDER SURGERY      HYSTERECTOMY      UMBILICAL HERNIA REPAIR         Meds/Allergies:    Prior to Admission medications    Medication Sig Start Date End Date Taking? Authorizing Provider   atorvastatin (LIPITOR) 40 mg tablet TAKE 1 TABLET BY MOUTH ONCE  DAILY 12/27/23   Linda Patton DO   Continuous Blood Gluc  (FreeStyle Mark 2 [place]) SAGE Check blood sugars multiple times per day 2/2/24   Linda Patton DO   Continuous Blood Gluc Sensor (FreeStyle Mark 2 Sensor) MISC Check blood sugars multiple times per day 2/2/24   Linda Patton DO   DULoxetine (CYMBALTA) 60 mg delayed release capsule TAKE 1 CAPSULE BY MOUTH DAILY 12/27/23   Linda Patton DO   fexofenadine (ALLEGRA) 180 MG tablet Take 180 mg by mouth    Historical Provider, MD   furosemide (LASIX) 40 mg tablet Take 40 mg by mouth daily    Historical Provider, MD   gabapentin (NEURONTIN) 300 mg capsule TAKE 2 CAPSULES BY MOUTH 3 TIMES DAILY 12/27/23   Linda  Olga Lidia Patton DO   glipiZIDE (GLUCOTROL XL) 5 mg 24 hr tablet TAKE 1 TABLET BY MOUTH DAILY 23   Linda Patton DO   hydroCHLOROthiazide 25 mg tablet Take 1 tablet (25 mg total) by mouth daily 24   Linda Patton DO   insulin glargine (Toujeo Max SoloStar) 300 units/mL CONCENTRATED U-300 injection pen (2-unit dial) Inject 45 Units under the skin daily at bedtime 10/16/23   Linda Patton DO   omeprazole (PriLOSEC) 40 MG capsule TAKE 1 CAPSULE BY MOUTH DAILY 23   Enrike Higginbotham DO   ondansetron (ZOFRAN-ODT) 4 mg disintegrating tablet Take 1 tablet (4 mg total) by mouth every 6 (six) hours as needed for nausea or vomiting 23   iLnda Patton DO   potassium chloride (K-DUR,KLOR-CON) 20 mEq tablet Take 1 tablet (20 mEq total) by mouth daily 23   Linda Patton DO     I have reviewed home medications with patient personally.    Allergies:   Allergies   Allergen Reactions    Penicillins Throat Swelling and Tongue Swelling    Pollen Extract Abdominal Pain       Social History:     Marital Status: /Civil Union   Occupation:   Patient Pre-hospital Living Situation: Resides with   Patient Pre-hospital Level of Mobility: Independent  Patient Pre-hospital Diet Restrictions: none  Substance Use History:   Social History     Substance and Sexual Activity   Alcohol Use Not Currently    Comment: socially - rare     Social History     Tobacco Use   Smoking Status Former    Current packs/day: 0.00    Types: Cigarettes    Quit date: 2000    Years since quittin.2   Smokeless Tobacco Former     Social History     Substance and Sexual Activity   Drug Use Yes    Frequency: 7.0 times per week    Types: Marijuana    Comment: every day medical marijuana, oil or smoke       Family History:    Family History   Problem Relation Age of Onset    Heart disease Mother     Cancer Father     Hypertension Father     Cancer Sister     Mental  "illness Sister     Cancer Paternal Aunt     Diabetes Brother        Physical Exam:     Vitals:   Blood Pressure: 161/75 (02/13/24 1800)  Pulse: 98 (02/13/24 2000)  Temperature: 98 °F (36.7 °C) (02/13/24 1615)  Temp Source: Temporal (02/13/24 1615)  Respirations: (!) 23 (02/13/24 2000)  Height: 5' 2\" (157.5 cm) (02/13/24 1615)  Weight - Scale: 113 kg (250 lb) (02/13/24 1615)  SpO2: 94 % (02/13/24 2000)    Physical Exam  Constitutional:       Appearance: She is obese.   Cardiovascular:      Rate and Rhythm: Normal rate and regular rhythm.      Pulses: Normal pulses.      Heart sounds: Normal heart sounds.   Pulmonary:      Effort: Pulmonary effort is normal. No respiratory distress.      Breath sounds: Wheezing present. No rales.      Comments: Mod air entry  Abdominal:      General: Bowel sounds are normal. There is no distension.      Palpations: Abdomen is soft.      Tenderness: There is no abdominal tenderness. There is no guarding.   Musculoskeletal:         General: Normal range of motion.      Cervical back: Normal range of motion and neck supple.      Right lower leg: No edema.      Left lower leg: No edema.   Skin:     General: Skin is warm and dry.   Neurological:      General: No focal deficit present.      Mental Status: She is alert and oriented to person, place, and time. Mental status is at baseline.      Cranial Nerves: No cranial nerve deficit.      Motor: No weakness.             Additional Data:     Lab Results: I have personally reviewed pertinent reports.      Results from last 7 days   Lab Units 02/13/24  1644   WBC Thousand/uL 8.40   HEMOGLOBIN g/dL 15.4   HEMATOCRIT % 48.6*   PLATELETS Thousands/uL 252   NEUTROS PCT % 62   LYMPHS PCT % 22   MONOS PCT % 8   EOS PCT % 7*     Results from last 7 days   Lab Units 02/13/24  1644   SODIUM mmol/L 143   POTASSIUM mmol/L 3.6   CHLORIDE mmol/L 106   CO2 mmol/L 27   BUN mg/dL 12   CREATININE mg/dL 0.88   ANION GAP mmol/L 10   CALCIUM mg/dL 9.7   ALBUMIN " g/dL 4.4   TOTAL BILIRUBIN mg/dL 0.56   ALK PHOS U/L 93   ALT U/L 19   AST U/L 20   GLUCOSE RANDOM mg/dL 121                       Imaging:  f/u official cxr results    XR chest 1 view portable    (Results Pending)       EKG, Pathology, and Other Studies Reviewed on Admission:   EKG: SR @ 85 bpm, + PVC    Allscripts / Epic Records Reviewed: Yes     ** Please Note: This note has been constructed using a voice recognition system. **

## 2024-02-14 NOTE — ASSESSMENT & PLAN NOTE
Secondary to acute viral bronchitis  Pt with no formal dx of obstructive disease but has previous tobacco use though quit since 2000  Continue IV steroids, pulm toilet, azithromycin   CXR negative, procalcitonin negative.   Neg COVID/Flu/RSV  Cont O2 supplementation and wean as tolerated  Referral to pulmonary as outpt for PFT

## 2024-02-14 NOTE — ASSESSMENT & PLAN NOTE
Met criteria with tachypnea, tachycardia secondary to acute bronchitis  No suspected source of bacterial infectioin

## 2024-02-14 NOTE — RESPIRATORY THERAPY NOTE
RT Protocol Note  Hattie Patton 68 y.o. female MRN: [id number]  Unit/Bed#: ED 28 Encounter: [id number]    Assessment    Principal Problem:    Acute respiratory failure with hypoxia (HCC)  Active Problems:    Obesity, morbid (HCC)    Primary hypertension    Type 2 diabetes mellitus with diabetic polyneuropathy, with long-term current use of insulin (HCC)    SIRS (systemic inflammatory response syndrome) (HCC)      Home Pulmonary Medications:     24 2300   Respiratory Protocol   Protocol Initiated? Yes   Protocol Selection Respiratory   Language Barrier? No   Medical & Social History Reviewed? Yes   Diagnostic Studies Reviewed? Yes   Physical Assessment Performed? Yes   Respiratory Plan No distress/Pulmonary history   Respiratory Assessment   Assessment Type Assess only   General Appearance Alert;Awake   Respiratory Pattern Labored   Chest Assessment Chest expansion symmetrical   Bilateral Breath Sounds Diminished;Expiratory wheezes;Crackles   Cough Non-productive   Resp Comments Pt does not use inhalers, nebulizersor Home O2. No cpap usage and the pt was dignosed with COPD today   O2 Device NC   Additional Assessments   Pulse 88   Respirations (!) 24   SpO2 96 %            Past Medical History:   Diagnosis Date    Cancer (HCC)     Diabetes mellitus (HCC)     Diverticulitis of colon     Hypertension      Social History     Socioeconomic History    Marital status: /Civil Union     Spouse name: None    Number of children: None    Years of education: None    Highest education level: None   Occupational History    None   Tobacco Use    Smoking status: Former     Current packs/day: 0.00     Types: Cigarettes     Quit date: 2000     Years since quittin.2    Smokeless tobacco: Former   Vaping Use    Vaping status: Never Used   Substance and Sexual Activity    Alcohol use: Not Currently     Comment: socially - rare    Drug use: Yes     Frequency: 7.0 times per week     Types: Marijuana     Comment:  "every day medical marijuana, oil or smoke    Sexual activity: Not Currently     Partners: Male     Birth control/protection: None   Other Topics Concern    None   Social History Narrative    None     Social Determinants of Health     Financial Resource Strain: High Risk (10/16/2023)    Overall Financial Resource Strain (CARDIA)     Difficulty of Paying Living Expenses: Hard   Food Insecurity: Not on file   Transportation Needs: No Transportation Needs (10/16/2023)    PRAPARE - Transportation     Lack of Transportation (Medical): No     Lack of Transportation (Non-Medical): No   Physical Activity: Not on file   Stress: Stress Concern Present (10/28/2022)    [employer] [place] [facility] [employer] - Occupational Stress Questionnaire     Feeling of Stress : Rather much   Social Connections: Not on file   Intimate Partner Violence: Not on file   Housing Stability: Not on file       Subjective         Objective    Physical Exam:   Assessment Type: Assess only  General Appearance: Alert, Awake  Respiratory Pattern: Labored  Chest Assessment: Chest expansion symmetrical  Bilateral Breath Sounds: Diminished, Expiratory wheezes, Crackles  Cough: Non-productive  O2 Device: NC    Vitals:  Blood pressure 161/75, pulse 88, temperature 98 °F (36.7 °C), temperature source Temporal, resp. rate (!) 24, height 5' 2\" (1.575 m), weight 113 kg (250 lb), SpO2 96%.          Imaging and other studies: I have personally reviewed pertinent reports.      O2 Device: NC     Plan    Respiratory Plan: No distress/Pulmonary history        Resp Comments: Pt does not use inhalers, nebulizersor Home O2. No cpap usage and the pt was dignosed with COPD today   "

## 2024-02-14 NOTE — ASSESSMENT & PLAN NOTE
Secondary to acute bronchitis  Pt with no formal dx of obstructive disease but has previous tobacco use though quit since 2000  Place on IV steroids, pulm toilet, azithromycin   S/p CXR preliminary no consolidation. F/u official cxr results  Neg COVID/Flu/RSV  Cont O2 supplementation and wean as tolerated  Referral to pulmonary as outpt for PFT

## 2024-02-15 PROBLEM — R78.81 POSITIVE BLOOD CULTURE: Status: ACTIVE | Noted: 2024-02-15

## 2024-02-15 LAB
ANION GAP SERPL CALCULATED.3IONS-SCNC: 6 MMOL/L
BUN SERPL-MCNC: 27 MG/DL (ref 5–25)
CALCIUM SERPL-MCNC: 9.4 MG/DL (ref 8.4–10.2)
CHLORIDE SERPL-SCNC: 106 MMOL/L (ref 96–108)
CO2 SERPL-SCNC: 30 MMOL/L (ref 21–32)
CREAT SERPL-MCNC: 0.84 MG/DL (ref 0.6–1.3)
ERYTHROCYTE [DISTWIDTH] IN BLOOD BY AUTOMATED COUNT: 14.6 % (ref 11.6–15.1)
GFR SERPL CREATININE-BSD FRML MDRD: 72 ML/MIN/1.73SQ M
GLUCOSE SERPL-MCNC: 218 MG/DL (ref 65–140)
GLUCOSE SERPL-MCNC: 219 MG/DL (ref 65–140)
HCT VFR BLD AUTO: 41.7 % (ref 34.8–46.1)
HGB BLD-MCNC: 13.1 G/DL (ref 11.5–15.4)
MCH RBC QN AUTO: 27.3 PG (ref 26.8–34.3)
MCHC RBC AUTO-ENTMCNC: 31.4 G/DL (ref 31.4–37.4)
MCV RBC AUTO: 87 FL (ref 82–98)
PLATELET # BLD AUTO: 222 THOUSANDS/UL (ref 149–390)
PMV BLD AUTO: 10.4 FL (ref 8.9–12.7)
POTASSIUM SERPL-SCNC: 3.8 MMOL/L (ref 3.5–5.3)
RBC # BLD AUTO: 4.8 MILLION/UL (ref 3.81–5.12)
SODIUM SERPL-SCNC: 142 MMOL/L (ref 135–147)
WBC # BLD AUTO: 10.66 THOUSAND/UL (ref 4.31–10.16)

## 2024-02-15 PROCEDURE — 99232 SBSQ HOSP IP/OBS MODERATE 35: CPT | Performed by: STUDENT IN AN ORGANIZED HEALTH CARE EDUCATION/TRAINING PROGRAM

## 2024-02-15 PROCEDURE — 94760 N-INVAS EAR/PLS OXIMETRY 1: CPT

## 2024-02-15 PROCEDURE — 82948 REAGENT STRIP/BLOOD GLUCOSE: CPT

## 2024-02-15 PROCEDURE — 94664 DEMO&/EVAL PT USE INHALER: CPT

## 2024-02-15 PROCEDURE — 87040 BLOOD CULTURE FOR BACTERIA: CPT

## 2024-02-15 PROCEDURE — 94640 AIRWAY INHALATION TREATMENT: CPT

## 2024-02-15 PROCEDURE — 85027 COMPLETE CBC AUTOMATED: CPT | Performed by: STUDENT IN AN ORGANIZED HEALTH CARE EDUCATION/TRAINING PROGRAM

## 2024-02-15 PROCEDURE — 99223 1ST HOSP IP/OBS HIGH 75: CPT | Performed by: INTERNAL MEDICINE

## 2024-02-15 PROCEDURE — 80048 BASIC METABOLIC PNL TOTAL CA: CPT | Performed by: STUDENT IN AN ORGANIZED HEALTH CARE EDUCATION/TRAINING PROGRAM

## 2024-02-15 RX ADMIN — PANTOPRAZOLE SODIUM 40 MG: 40 TABLET, DELAYED RELEASE ORAL at 05:51

## 2024-02-15 RX ADMIN — GABAPENTIN 600 MG: 300 CAPSULE ORAL at 10:15

## 2024-02-15 RX ADMIN — GUAIFENESIN 600 MG: 600 TABLET ORAL at 17:49

## 2024-02-15 RX ADMIN — IPRATROPIUM BROMIDE 0.5 MG: 0.5 SOLUTION RESPIRATORY (INHALATION) at 07:24

## 2024-02-15 RX ADMIN — GUAIFENESIN 600 MG: 600 TABLET ORAL at 10:15

## 2024-02-15 RX ADMIN — DULOXETINE HYDROCHLORIDE 60 MG: 60 CAPSULE, DELAYED RELEASE ORAL at 10:15

## 2024-02-15 RX ADMIN — FUROSEMIDE 40 MG: 40 TABLET ORAL at 10:15

## 2024-02-15 RX ADMIN — METHYLPREDNISOLONE SODIUM SUCCINATE 40 MG: 40 INJECTION, POWDER, FOR SOLUTION INTRAMUSCULAR; INTRAVENOUS at 20:40

## 2024-02-15 RX ADMIN — IPRATROPIUM BROMIDE 0.5 MG: 0.5 SOLUTION RESPIRATORY (INHALATION) at 20:05

## 2024-02-15 RX ADMIN — ENOXAPARIN SODIUM 40 MG: 40 INJECTION SUBCUTANEOUS at 10:15

## 2024-02-15 RX ADMIN — LEVALBUTEROL HYDROCHLORIDE 1.25 MG: 1.25 SOLUTION RESPIRATORY (INHALATION) at 07:23

## 2024-02-15 RX ADMIN — GABAPENTIN 600 MG: 300 CAPSULE ORAL at 17:49

## 2024-02-15 RX ADMIN — INSULIN GLARGINE 45 UNITS: 100 INJECTION, SOLUTION SUBCUTANEOUS at 20:40

## 2024-02-15 RX ADMIN — LORATADINE 10 MG: 10 TABLET ORAL at 10:15

## 2024-02-15 RX ADMIN — ATORVASTATIN CALCIUM 40 MG: 40 TABLET, FILM COATED ORAL at 10:15

## 2024-02-15 RX ADMIN — LEVALBUTEROL HYDROCHLORIDE 1.25 MG: 1.25 SOLUTION RESPIRATORY (INHALATION) at 13:35

## 2024-02-15 RX ADMIN — IPRATROPIUM BROMIDE 0.5 MG: 0.5 SOLUTION RESPIRATORY (INHALATION) at 13:35

## 2024-02-15 RX ADMIN — METHYLPREDNISOLONE SODIUM SUCCINATE 40 MG: 40 INJECTION, POWDER, FOR SOLUTION INTRAMUSCULAR; INTRAVENOUS at 10:14

## 2024-02-15 RX ADMIN — LEVALBUTEROL HYDROCHLORIDE 1.25 MG: 1.25 SOLUTION RESPIRATORY (INHALATION) at 20:05

## 2024-02-15 RX ADMIN — AZITHROMYCIN 500 MG: 500 TABLET, FILM COATED ORAL at 10:15

## 2024-02-15 NOTE — RESPIRATORY THERAPY NOTE
RT Protocol Note  Hattie Patton 68 y.o. female MRN: [id number]  Unit/Bed#: -01 Encounter: [id number]    Assessment    Principal Problem:    Acute respiratory failure with hypoxia (HCC)  Active Problems:    Obesity, morbid (HCC)    Primary hypertension    Type 2 diabetes mellitus with diabetic polyneuropathy, with long-term current use of insulin (HCC)    SIRS (systemic inflammatory response syndrome) (HCC)      Home Pulmonary Medications:  none       Past Medical History:   Diagnosis Date    Cancer (HCC)     Diabetes mellitus (HCC)     Diverticulitis of colon     Hypertension      Social History     Socioeconomic History    Marital status: /Civil Union     Spouse name: None    Number of children: None    Years of education: None    Highest education level: None   Occupational History    None   Tobacco Use    Smoking status: Former     Current packs/day: 0.00     Types: Cigarettes     Quit date: 2000     Years since quittin.2    Smokeless tobacco: Former   Vaping Use    Vaping status: Never Used   Substance and Sexual Activity    Alcohol use: Never     Comment: socially - rare    Drug use: Yes     Frequency: 7.0 times per week     Types: Marijuana     Comment: every day medical marijuana, oil or smoke    Sexual activity: Not Currently     Partners: Male     Birth control/protection: None   Other Topics Concern    None   Social History Narrative    None     Social Determinants of Health     Financial Resource Strain: High Risk (10/16/2023)    Overall Financial Resource Strain (CARDIA)     Difficulty of Paying Living Expenses: Hard   Food Insecurity: Not on file   Transportation Needs: No Transportation Needs (10/16/2023)    PRAPARE - Transportation     Lack of Transportation (Medical): No     Lack of Transportation (Non-Medical): No   Physical Activity: Not on file   Stress: Stress Concern Present (10/28/2022)    [employer] [place] [facility] [employer] - Occupational Stress Questionnaire      "Feeling of Stress : Rather much   Social Connections: Not on file   Intimate Partner Violence: Not on file   Housing Stability: Not on file       Subjective         Objective    Physical Exam:        Vitals:  Blood pressure 154/74, pulse 83, temperature 98.9 °F (37.2 °C), temperature source Oral, resp. rate 19, height 5' 2\" (1.575 m), weight 112 kg (248 lb), SpO2 95%.          Imaging and other studies:     O2 Device: NC     Plan    Respiratory Plan: No distress/Pulmonary history        Resp Comments: patient with no pulmonary history at home and takes no pulmonary medication at home does have SOB and bilateral wheezing. WIll continue with scheduled txs, will also give the patient a flutter valve to help with possible mucus.   "

## 2024-02-15 NOTE — QUICK NOTE
Received notification of blood cultures results taken on 2/13/24 (one set) reveling gram positive cocci in cluster, results are likely to be contaminated. A new order for blood cultures x2 different sites have been ordered. The patient is afebrile, normotensive on azithromycin therapy.

## 2024-02-15 NOTE — ASSESSMENT & PLAN NOTE
Blood cultures from admission growing coagulase-negative staph in both.  Suspicion that both cultures were drawn from the same site.  No evidence to suggest a true bacteremia currently.  Holding on antibiotics tailored for this.  Infectious disease consulted, will follow-up repeat cultures and if negative patient may be able to go home as early as tomorrow

## 2024-02-15 NOTE — PLAN OF CARE
Problem: RESPIRATORY - ADULT  Goal: Achieves optimal ventilation and oxygenation  Description: INTERVENTIONS:  - Assess for changes in respiratory status  - Assess for changes in mentation and behavior  - Position to facilitate oxygenation and minimize respiratory effort  - Oxygen administered by appropriate delivery if ordered  - Initiate smoking cessation education as indicated  - Encourage broncho-pulmonary hygiene including cough, deep breathe, Incentive Spirometry  - Assess the need for suctioning and aspirate as needed  - Assess and instruct to report SOB or any respiratory difficulty  - Respiratory Therapy support as indicated  2/15/2024 1208 by Garry El RN  Outcome: Progressing  2/15/2024 1208 by Garry El RN  Outcome: Progressing

## 2024-02-15 NOTE — CASE MANAGEMENT
Case Management Assessment & Discharge Planning Note    Patient name Hattie Patton  Location /-01 MRN [id number]  : 1955 Date 2/15/2024       Current Admission Date: 2024  Current Admission Diagnosis:Acute respiratory failure with hypoxia (HCC)   Patient Active Problem List    Diagnosis Date Noted    Positive blood culture 02/15/2024    COPD exacerbation (HCC) 2024    Acute respiratory failure with hypoxia (HCC) 2024    SIRS (systemic inflammatory response syndrome) (HCC) 2024    Diverticulosis 2023    Pancreatic cyst 2023    Lumbar facet arthropathy 2023    Mixed hyperlipidemia 2022    Primary hypertension 2022    Type 2 diabetes mellitus with diabetic polyneuropathy, with long-term current use of insulin ([facility]) 2022    Obesity, morbid ([facility]) 2022      LOS (days): 2  Geometric Mean LOS (GMLOS) (days): 3.6  Days to GMLOS:1.7     OBJECTIVE:    Risk of Unplanned Readmission Score: 8.36         Current admission status: Inpatient       Preferred Pharmacy:   [facility] [address]  [address] [address]  Phone: [phone] Fax: [phone]    [id number] [address] [address]  Phone: [phone] Fax: [phone]    Primary Care Provider: Linda Patton DO    Primary Insurance: MEDICARE  Secondary Insurance: AETNA    ASSESSMENT:  Active Health Care Proxies    There are no active Health Care Proxies on file.                 Readmission Root Cause  30 Day Readmission: No    Patient Information  Admitted from:: Home  Mental Status: Alert  During Assessment patient was accompanied by: Sister, Spouse  Assessment information provided by:: Patient, Spouse, Sister  Primary Caregiver: Self  Support Systems: Self, Spouse/significant other, Family members  County of Residence:  [place]  What city do you live in?: [place]  Home entry access options. Select all that apply.: Stairs  Number of steps to enter home.: 5  Do the steps have railings?: Yes  Type of Current Residence: [facility]  Living Arrangements: Lives w/ Spouse/significant other  Is patient a ?: No    Activities of Daily Living Prior to Admission  Functional Status: Independent  Completes ADLs independently?: Yes  Ambulates independently?: No  Level of ambulatory dependence: Assistance  Does patient use assisted devices?: Yes  Assisted Devices (DME) used: Rollator, Straight Cane, Other (Comment) (scooter)  Does patient currently own DME?: Yes  What DME does the patient currently own?: Rollator, Straight Cane, Other (Comment)  Does patient have a history of Outpatient Therapy (PT/OT)?: No  Does the patient have a history of Short-Term Rehab?: No  Does patient have a history of HHC?: No  Does patient currently have HHC?: No         Patient Information Continued  Income Source: Pension/correction  Does patient have prescription coverage?: Yes  Does patient receive dialysis treatments?: No  Does patient have a history of substance abuse?: No  Does patient have a history of Mental Health Diagnosis?: Yes (anxiety/ depression)  Is patient receiving treatment for mental health?: Yes  Has patient received inpatient treatment related to mental health in the last 2 years?: No    PHQ 2/9 Screening   Reviewed PHQ 2/9 Depression Screening Score?: No    Means of Transportation  Means of Transport to Appts:: Family transport      Social Determinants of Health (SDOH)      Flowsheet Row Most Recent Value   Housing Stability    In the last 12 months, was there a time when you were not able to pay the mortgage or rent on time? N   In the last 12 months, how many places have you lived? 1   In the last 12 months, was there a time when you did not have a steady place to sleep or slept in a shelter (including now)? N   Transportation Needs     In the past 12 months, has lack of transportation kept you from medical appointments or from getting medications? no   In the past 12 months, has lack of transportation kept you from meetings, work, or from getting things needed for daily living? No   Food Insecurity    Within the past 12 months, you worried that your food would run out before you got the money to buy more. Never true   Within the past 12 months, the food you bought just didn't last and you didn't have money to get more. Never true   Utilities    In the past 12 months has the electric, gas, oil, or water company threatened to shut off services in your home? No            DISCHARGE DETAILS:    Discharge planning discussed with:: Patient, , and sister at the bedside  Freedom of Choice: Yes  Comments - Freedom of Choice: FOC maintained in discussion regarding discharge planning. Patient is alert oriented and competent to make decisions. Introduced self and role, explained role of CM in arranging services such as DME, STR, HHC, and providing community resource information. Discussed current living situation and needs at discharge. Patient is IPTA, uses a cane, scooter, and rollator as needed to ambulate, asking for a walker as she feels more stable with that in the hospital than the cane she has been using at home. States she hasn't been cooking and sometimes skips eating because she is too exhausted and anxious to cook meals. CM offered HHC, STR, DME, PCP, OP CM, community resources. Patient accepted information and referrals for meal delivery services, a walker, and HHC PT. Pt is aware and encouraged to seek CM for any questions or concerns. CM continues to follow.  CM contacted family/caregiver?: Yes  Were Treatment Team discharge recommendations reviewed with patient/caregiver?: Yes  Did patient/caregiver verbalize understanding of patient care needs?: Yes  Were patient/caregiver advised of the risks associated with not following Treatment  Team discharge recommendations?: Yes    Contacts  Patient Contacts: FER STYLES (Spouse)  [phone] (Mobile)  Relationship to Patient:: Family  Contact Method: In Person  Reason/Outcome: Continuity of Care, Emergency Contact, Discharge Planning    Requested Home Health Care         Is the patient interested in [facility] at discharge?: Yes  Home Health Discipline requested:: Occupational Therapy, Physical Therapy  Home Health Follow-Up Provider:: PCP  Home Health Services Needed:: Evaluate Functional Status and Safety, Gait/ADL Training, Strengthening/Theraputic Exercises to Improve Function  Homebound Criteria Met:: Requires the Assistance of Another Person for Safe Ambulation or to Leave the Home, Uses an Assist Device (i.e. cane, walker, etc)  Supporting Clincal Findings:: Fatigues Easliy in Short Distances, Dyspnea with Exertion, Limited Endurance    DME Referral Provided  Referral made for DME?: Yes  DME referral completed for the following items:: Walker  [facility]    Other Referral/Resources/Interventions Provided:  Interventions: Meals on Wheels, HHC, DME  Referral Comments: Walker request in [place], referral to HHC for PT in [place], and information for Meals on Wheels and other meal delivery services to be given to patient and family. CM will continue to follow for needs.    Would you like to participate in our [facility] service program?  : No - Declined       Discharge Destination Plan:: Home with Home Health Care  Transport at Discharge : Family

## 2024-02-15 NOTE — NURSING NOTE
Ambulating pulse oximetry - Hattie was at 93% oxygen saturation on room air at rest and at the initiation of ambulating pulse oximetry.  Patient was ambulated 100 feet with max desaturation to 91% on room air.  Returned to recliner and oxygen recovered to initial levels of 93% on room air.

## 2024-02-15 NOTE — CONSULTS
Consultation - Infectious Disease   Hattie Patton 68 y.o. female MRN: [id number]  Unit/Bed#: -01 Encounter: [id number]      IMPRESSION & RECOMMENDATIONS:   Impression/Recommendations:  SIRS, POA.  Tachycardia, tachypnea.  Secondary to #3.  Doubt bacteremia, as stated below.  Patient remains afebrile and hemodynamically stable.  WBC count mildly up today likely due to steroid.  -Continue close observation off IV antibiotic  -Follow-up blood cultures  -Follow temperatures and hemodynamics  Positive blood cultures.  Both sets of admission blood cultures now with growth of coagulase-negative staph.  After discussion with patient, it appears that both sets were drawn from same site.  High suspicion for contaminated specimens.  Patient remains afebrile off appropriate antibiotics.  She has no intravascular prosthetic devices.  No wounds or evidence of SSTI.  -Continue close observation off IV antibiotic given ongoing clinical stability  -Follow-up repeat blood cultures  -Follow-up initial blood cultures until final.  Acute hypoxic respiratory failure.  Suspected secondary to possible acute viral bronchitis with concern for underlying reactive airway disease.  Improved with nebulizer treatment, steroid.  No clinical or radiographic evidence to suggest acute bacterial infection.  Negative procalcitonin.  -Management per primary service  -Follow respiratory symptoms/O2 requirements  Morbid obesity, with BMI of 45.  DM2, with long-term insulin use and polyneuropathy.  Risk factor for infection.      Antibiotics:  Azithromycin 3    I discussed above plan with patient and  at bedside, and with Dr. Kramer who agrees with following up repeat blood cultures.  I personally reviewed outpatient medical records.  Thank you for this consultation.  We will follow along with you.        HISTORY OF PRESENT ILLNESS:  Reason for Consult: SIRS, positive blood cultures    HPI: Hattie Patton is a 68 y.o. female with morbid  obesity, DM2, neuropathy, Omalley's esophagus who presented to ER on  with worsening shortness of breath x 2 days and cough.  Noted to have O2 sat of 89% on room air which improved after multiple rounds of nebulizer treatment and 2 L supplemental oxygen.  No fevers.  No leukocytosis.  Blood cultures were obtained on admission and now positive for coagulase-negative staph.  Patient received 1 dose of doxycycline followed by oral azithromycin.  ID service is consulted for recommendations regarding positive blood cultures.  Patient is feeling much better with improving respiratory symptoms.  Remains without fevers or chills.    REVIEW OF SYSTEMS:  A complete system-based review of systems is otherwise negative.    PAST MEDICAL HISTORY:  Past Medical History:   Diagnosis Date    Cancer (HCC)     Diabetes mellitus (HCC)     Diverticulitis of colon     Hypertension      Past Surgical History:   Procedure Laterality Date    CHOLECYSTECTOMY      COLONOSCOPY      GALLBLADDER SURGERY      HYSTERECTOMY      UMBILICAL HERNIA REPAIR         FAMILY HISTORY:  Non-contributory    SOCIAL HISTORY:  Social History     Substance and Sexual Activity   Alcohol Use Never    Comment: socially - rare     Social History     Substance and Sexual Activity   Drug Use Yes    Frequency: 7.0 times per week    Types: Marijuana    Comment: every day medical marijuana, oil or smoke     Social History     Tobacco Use   Smoking Status Former    Current packs/day: 0.00    Types: Cigarettes    Quit date: 2000    Years since quittin.2   Smokeless Tobacco Former       ALLERGIES:  Allergies   Allergen Reactions    Penicillins Throat Swelling and Tongue Swelling    Pollen Extract Abdominal Pain       MEDICATIONS:  All current active medications have been reviewed.    PHYSICAL EXAM:  Vitals:  Temp:  [97.8 °F (36.6 °C)-98.9 °F (37.2 °C)] 97.8 °F (36.6 °C)  HR:  [74-94] 83  Resp:  [15-19] 18  BP: (137-154)/() 137/105  SpO2:  [93 %-96 %] 93  %  Temp (24hrs), Av.4 °F (36.9 °C), Min:97.8 °F (36.6 °C), Max:98.9 °F (37.2 °C)  Current: Temperature: 97.8 °F (36.6 °C)     Physical Exam:  General:  Well-nourished, well-developed, in no acute distress  Eyes:  Conjunctive clear with no hemorrhages or effusions  Oropharynx:  No ulcers, no lesions  Neck: Trachea midline  Lungs:  Clear to auscultation bilaterally, no accessory muscle use  Cardiac:  Regular rate and rhythm, no murmurs  Abdomen:  Soft, non-tender, non-distended, obese  Extremities: Symmetric edema  Skin:  No rashes, no ulcers  Neurological:  Moves all four extremities spontaneously    LABS, IMAGING, & OTHER STUDIES:  Lab Results:  I have personally reviewed pertinent labs.  Results from last 7 days   Lab Units 02/15/24  0547 24  0640 24  1644   POTASSIUM mmol/L 3.8 4.2 3.6   CHLORIDE mmol/L 106 105 106   CO2 mmol/L 30 27 27   BUN mg/dL 27* 17 12   CREATININE mg/dL 0.84 0.74 0.88   EGFR ml/min/1.73sq m 72 84 68   CALCIUM mg/dL 9.4 9.2 9.7   AST U/L  --   --  20   ALT U/L  --   --  19   ALK PHOS U/L  --   --  93     Results from last 7 days   Lab Units 02/15/24  0547 24  0640 24  1644   WBC Thousand/uL 10.66* 4.03* 8.40   HEMOGLOBIN g/dL 13.1 13.4 15.4   PLATELETS Thousands/uL 222 218 252     Results from last 7 days   Lab Units 02/15/24  0545 24  2132 24  2031   BLOOD CULTURE  Received in Microbiology Lab. Culture in Progress.  Received in Microbiology Lab. Culture in Progress.  --   --    GRAM STAIN RESULT   --  Gram positive cocci in clusters* Gram positive cocci in clusters*       Imaging Studies:   I have personally reviewed pertinent imaging study reports and images in PACS.    Chest x-ray, personally reviewed, shows no acute cardiopulmonary disease.    EKG, Pathology, and Other Studies:   I have personally reviewed pertinent reports.

## 2024-02-15 NOTE — ASSESSMENT & PLAN NOTE
Secondary to acute viral bronchitis  Pt with no formal dx of obstructive disease but has previous tobacco use though quit since 2000  Continue IV steroids, pulm toilet, azithromycin   CXR negative, procalcitonin negative.   Neg COVID/Flu/RSV  Referral to pulmonary as outpt for PFT  Weaned off supplemental oxygen, ambulatory O2 sat normal range.  Will not qualify for home oxygen.

## 2024-02-15 NOTE — PROGRESS NOTES
[facility]  Progress Note  Name: Hattie Patton I  MRN: [id number]  Unit/Bed#: -01 I Date of Admission: 2/13/2024   Date of Service: 2/15/2024 I Hospital Day: 2    Assessment/Plan   Positive blood culture  Assessment & Plan  Blood cultures from admission growing coagulase-negative staph in both.  Suspicion that both cultures were drawn from the same site.  No evidence to suggest a true bacteremia currently.  Holding on antibiotics tailored for this.  Infectious disease consulted, will follow-up repeat cultures and if negative patient may be able to go home as early as tomorrow    SIRS (systemic inflammatory response syndrome) (Formerly [facility])  Assessment & Plan  Met criteria with tachypnea, tachycardia secondary to acute bronchitis  No suspected source of bacterial infectioin    Type 2 diabetes mellitus with diabetic polyneuropathy, with long-term current use of insulin (Formerly [facility])  Assessment & Plan  Lab Results   Component Value Date    HGBA1C 6.4 06/23/2023       Recent Labs     02/13/24  2335 02/14/24 2032   POCGLU 266* 218*         Blood Sugar Average: Last 72 hrs:  (P) 242Resume on insulin regimen  ISS      Primary hypertension  Assessment & Plan  Stable  Cont meds; not sure why on both HCTZ and lasix    Obesity, morbid (HCC)  Assessment & Plan  Lifestyle/dietary modification    * Acute respiratory failure with hypoxia (HCC)  Assessment & Plan  Secondary to acute viral bronchitis  Pt with no formal dx of obstructive disease but has previous tobacco use though quit since 2000  Continue IV steroids, pulm toilet, azithromycin   CXR negative, procalcitonin negative.   Neg COVID/Flu/RSV  Referral to pulmonary as outpt for PFT  Weaned off supplemental oxygen, ambulatory O2 sat normal range.  Will not qualify for home oxygen.           VTE Pharmacologic Prophylaxis: VTE Score: 6 High Risk (Score >/= 5) - Pharmacological DVT Prophylaxis Ordered: enoxaparin (Lovenox). Sequential Compression Devices  Ordered.    Mobility:   Basic Mobility Inpatient Raw Score: 17  -HLM Goal: 5: Stand one or more mins  JH-HLM Achieved: 5: Stand (1 or more minutes)  HLM Goal achieved. Continue to encourage appropriate mobility.    Patient Centered Rounds: I performed bedside rounds with nursing staff today.   Discussions with Specialists or Other Care Team Provider: Infectious disease    Education and Discussions with Family / Patient: Updated  () at bedside.    Total Time Spent on Date of Encounter in care of patient: 40 mins. This time was spent on one or more of the following: performing physical exam; counseling and coordination of care; obtaining or reviewing history; documenting in the medical record; reviewing/ordering tests, medications or procedures; communicating with other healthcare professionals and discussing with patient's family/caregivers.    Current Length of Stay: 2 day(s)  Current Patient Status: Inpatient   Certification Statement: The patient will continue to require additional inpatient hospital stay due to breathing treatments, following up positive blood culture  Discharge Plan: Anticipate discharge tomorrow to home.    Code Status: Level 1 - Full Code    Subjective:   Patient reports her breathing is better, but not all the way back to baseline.  She is able to walk around bed becomes short of breath after short period.  No other new symptoms overnight.    Objective:     Vitals:   Temp (24hrs), Av.4 °F (36.9 °C), Min:97.8 °F (36.6 °C), Max:98.9 °F (37.2 °C)    Temp:  [97.8 °F (36.6 °C)-98.9 °F (37.2 °C)] 97.8 °F (36.6 °C)  HR:  [75-94] 83  Resp:  [18-19] 18  BP: (137-154)/() 137/105  SpO2:  [93 %-96 %] 93 %  Body mass index is 45.36 kg/m².     Input and Output Summary (last 24 hours):   No intake or output data in the 24 hours ending 02/15/24 1257    Physical Exam:   Physical Exam  Vitals and nursing note reviewed.   Constitutional:       Appearance: Normal appearance.    HENT:      Head: Normocephalic and atraumatic.      Mouth/Throat:      Mouth: Mucous membranes are moist.   Eyes:      Conjunctiva/sclera: Conjunctivae normal.      Pupils: Pupils are equal, round, and reactive to light.   Cardiovascular:      Rate and Rhythm: Normal rate and regular rhythm.      Pulses: Normal pulses.      Heart sounds: Normal heart sounds. No murmur heard.     No gallop.   Pulmonary:      Effort: Pulmonary effort is normal. No respiratory distress.      Breath sounds: No rales.      Comments: End expiratory wheezing  Abdominal:      General: Abdomen is flat. Bowel sounds are normal. There is no distension.      Palpations: Abdomen is soft.      Tenderness: There is no abdominal tenderness. There is no guarding or rebound.   Musculoskeletal:         General: No swelling. Normal range of motion.   Skin:     General: Skin is warm and dry.      Capillary Refill: Capillary refill takes less than 2 seconds.   Neurological:      General: No focal deficit present.      Mental Status: She is alert. Mental status is at baseline.   Psychiatric:         Mood and Affect: Mood normal.          Additional Data:     Labs:  Results from last 7 days   Lab Units 02/15/24  0547 02/14/24  0640 02/13/24  1644   WBC Thousand/uL 10.66*   < > 8.40   HEMOGLOBIN g/dL 13.1   < > 15.4   HEMATOCRIT % 41.7   < > 48.6*   PLATELETS Thousands/uL 222   < > 252   NEUTROS PCT %  --   --  62   LYMPHS PCT %  --   --  22   MONOS PCT %  --   --  8   EOS PCT %  --   --  7*    < > = values in this interval not displayed.     Results from last 7 days   Lab Units 02/15/24  0547 02/14/24  0640 02/13/24  1644   SODIUM mmol/L 142   < > 143   POTASSIUM mmol/L 3.8   < > 3.6   CHLORIDE mmol/L 106   < > 106   CO2 mmol/L 30   < > 27   BUN mg/dL 27*   < > 12   CREATININE mg/dL 0.84   < > 0.88   ANION GAP mmol/L 6   < > 10   CALCIUM mg/dL 9.4   < > 9.7   ALBUMIN g/dL  --   --  4.4   TOTAL BILIRUBIN mg/dL  --   --  0.56   ALK PHOS U/L  --   --  93    ALT U/L  --   --  19   AST U/L  --   --  20   GLUCOSE RANDOM mg/dL 218*   < > 121    < > = values in this interval not displayed.         Results from last 7 days   Lab Units 02/14/24 2032 02/13/24  2335   POC GLUCOSE mg/dl 218* 266*         Results from last 7 days   Lab Units 02/14/24  0640   PROCALCITONIN ng/ml 0.07       Lines/Drains:  Invasive Devices       Peripheral Intravenous Line  Duration             Peripheral IV 02/13/24 Left Antecubital 1 day                          Imaging: Reviewed radiology reports from this admission including: chest xray    Recent Cultures (last 7 days):   Results from last 7 days   Lab Units 02/15/24  0545 02/13/24 2132 02/13/24 2031   BLOOD CULTURE  Received in Microbiology Lab. Culture in Progress.  Received in Microbiology Lab. Culture in Progress. Staphylococcus coagulase negative*  --    GRAM STAIN RESULT   --  Gram positive cocci in clusters* Gram positive cocci in clusters*       Last 24 Hours Medication List:   Current Facility-Administered Medications   Medication Dose Route Frequency Provider Last Rate    acetaminophen  650 mg Oral Q6H PRN Princess Mehta, DO      albuterol  2 puff Inhalation Q4H PRN [facility] Viktor, DO      atorvastatin  40 mg Oral Daily Princess Mehta, DO      azithromycin  500 mg Oral Q24H Princess Mehta, DO      dextromethorphan-guaiFENesin  10 mL Oral Q4H PRN Princess Mehta, DO      DULoxetine  60 mg Oral Daily Princess Mehta, DO      enoxaparin  40 mg Subcutaneous Daily Princess Mehta, DO      furosemide  40 mg Oral Daily Princess Mehta, DO      gabapentin  600 mg Oral TID Princess Mehta, DO      guaiFENesin  600 mg Oral BID Princess Mehta, DO      insulin glargine  45 Units Subcutaneous HS Princess Mehta, DO      ipratropium  0.5 mg Nebulization TID [facility] Viktor, DO      levalbuterol  1.25 mg Nebulization TID [facility] Viktor, DO       loratadine  10 mg Oral Daily Princess Mehta,       methylPREDNISolone sodium succinate  40 mg Intravenous Q12H KRISTINE Princess Mehta, DO      pantoprazole  40 mg Oral Early Morning Princess Mehta,           Today, Patient Was Seen By: Raphael Kramer MD    **Please Note: This note may have been constructed using a voice recognition system.**

## 2024-02-15 NOTE — ASSESSMENT & PLAN NOTE
Lab Results   Component Value Date    HGBA1C 6.4 06/23/2023       Recent Labs     02/13/24  2335 02/14/24 2032   POCGLU 266* 218*         Blood Sugar Average: Last 72 hrs:  (P) 242Resume on insulin regimen  ISS

## 2024-02-16 VITALS
BODY MASS INDEX: 45.64 KG/M2 | TEMPERATURE: 97.6 F | OXYGEN SATURATION: 92 % | DIASTOLIC BLOOD PRESSURE: 75 MMHG | WEIGHT: 248 LBS | SYSTOLIC BLOOD PRESSURE: 140 MMHG | HEIGHT: 62 IN | RESPIRATION RATE: 18 BRPM | HEART RATE: 63 BPM

## 2024-02-16 PROBLEM — R06.00 DYSPNEA: Status: ACTIVE | Noted: 2024-02-16

## 2024-02-16 PROBLEM — J96.01 ACUTE RESPIRATORY FAILURE WITH HYPOXIA (HCC): Status: RESOLVED | Noted: 2024-02-13 | Resolved: 2024-02-16

## 2024-02-16 LAB
ERYTHROCYTE [DISTWIDTH] IN BLOOD BY AUTOMATED COUNT: 14.6 % (ref 11.6–15.1)
HCT VFR BLD AUTO: 42.2 % (ref 34.8–46.1)
HGB BLD-MCNC: 13.4 G/DL (ref 11.5–15.4)
MCH RBC QN AUTO: 27.5 PG (ref 26.8–34.3)
MCHC RBC AUTO-ENTMCNC: 31.8 G/DL (ref 31.4–37.4)
MCV RBC AUTO: 87 FL (ref 82–98)
PLATELET # BLD AUTO: 219 THOUSANDS/UL (ref 149–390)
PMV BLD AUTO: 10 FL (ref 8.9–12.7)
RBC # BLD AUTO: 4.87 MILLION/UL (ref 3.81–5.12)
WBC # BLD AUTO: 8.43 THOUSAND/UL (ref 4.31–10.16)

## 2024-02-16 PROCEDURE — 94760 N-INVAS EAR/PLS OXIMETRY 1: CPT

## 2024-02-16 PROCEDURE — 99232 SBSQ HOSP IP/OBS MODERATE 35: CPT | Performed by: INTERNAL MEDICINE

## 2024-02-16 PROCEDURE — 94664 DEMO&/EVAL PT USE INHALER: CPT

## 2024-02-16 PROCEDURE — 85027 COMPLETE CBC AUTOMATED: CPT | Performed by: STUDENT IN AN ORGANIZED HEALTH CARE EDUCATION/TRAINING PROGRAM

## 2024-02-16 PROCEDURE — 99239 HOSP IP/OBS DSCHRG MGMT >30: CPT | Performed by: STUDENT IN AN ORGANIZED HEALTH CARE EDUCATION/TRAINING PROGRAM

## 2024-02-16 PROCEDURE — 94640 AIRWAY INHALATION TREATMENT: CPT

## 2024-02-16 RX ORDER — ALBUTEROL SULFATE 90 UG/1
2 AEROSOL, METERED RESPIRATORY (INHALATION) EVERY 4 HOURS PRN
Qty: 6.7 G | Refills: 0 | Status: SHIPPED | OUTPATIENT
Start: 2024-02-16 | End: 2024-02-23 | Stop reason: SDUPTHER

## 2024-02-16 RX ORDER — PREDNISONE 20 MG/1
TABLET ORAL DAILY
Qty: 10 TABLET | Refills: 0 | Status: SHIPPED | OUTPATIENT
Start: 2024-02-16 | End: 2024-02-24

## 2024-02-16 RX ORDER — GUAIFENESIN/DEXTROMETHORPHAN 100-10MG/5
10 SYRUP ORAL EVERY 4 HOURS PRN
Qty: 237 ML | Refills: 0 | Status: SHIPPED | OUTPATIENT
Start: 2024-02-16

## 2024-02-16 RX ADMIN — DULOXETINE HYDROCHLORIDE 60 MG: 60 CAPSULE, DELAYED RELEASE ORAL at 07:48

## 2024-02-16 RX ADMIN — METHYLPREDNISOLONE SODIUM SUCCINATE 40 MG: 40 INJECTION, POWDER, FOR SOLUTION INTRAMUSCULAR; INTRAVENOUS at 07:49

## 2024-02-16 RX ADMIN — FUROSEMIDE 40 MG: 40 TABLET ORAL at 07:48

## 2024-02-16 RX ADMIN — GUAIFENESIN AND DEXTROMETHORPHAN 10 ML: 100; 10 SYRUP ORAL at 07:48

## 2024-02-16 RX ADMIN — ATORVASTATIN CALCIUM 40 MG: 40 TABLET, FILM COATED ORAL at 07:48

## 2024-02-16 RX ADMIN — AZITHROMYCIN 500 MG: 500 TABLET, FILM COATED ORAL at 07:48

## 2024-02-16 RX ADMIN — IPRATROPIUM BROMIDE 0.5 MG: 0.5 SOLUTION RESPIRATORY (INHALATION) at 13:17

## 2024-02-16 RX ADMIN — GUAIFENESIN 600 MG: 600 TABLET ORAL at 07:48

## 2024-02-16 RX ADMIN — GABAPENTIN 600 MG: 300 CAPSULE ORAL at 07:49

## 2024-02-16 RX ADMIN — IPRATROPIUM BROMIDE 0.5 MG: 0.5 SOLUTION RESPIRATORY (INHALATION) at 07:14

## 2024-02-16 RX ADMIN — ENOXAPARIN SODIUM 40 MG: 40 INJECTION SUBCUTANEOUS at 07:49

## 2024-02-16 RX ADMIN — PANTOPRAZOLE SODIUM 40 MG: 40 TABLET, DELAYED RELEASE ORAL at 05:54

## 2024-02-16 RX ADMIN — LORATADINE 10 MG: 10 TABLET ORAL at 07:48

## 2024-02-16 RX ADMIN — LEVALBUTEROL HYDROCHLORIDE 1.25 MG: 1.25 SOLUTION RESPIRATORY (INHALATION) at 13:17

## 2024-02-16 RX ADMIN — LEVALBUTEROL HYDROCHLORIDE 1.25 MG: 1.25 SOLUTION RESPIRATORY (INHALATION) at 07:16

## 2024-02-16 NOTE — DISCHARGE SUMMARY
[facility]  Discharge- Hattie Patton 1955, 68 y.o. female MRN: [id number]  Unit/Bed#: -01 Encounter: [id number]  Primary Care Provider: Linda Patton DO   Date and time admitted to hospital: 2/13/2024  4:35 PM    Positive blood culture  Assessment & Plan  Blood cultures from admission growing coagulase-negative staph in both.  Suspicion that both cultures were drawn from the same site.  No evidence to suggest a true bacteremia currently.  Holding on antibiotics tailored for this.  Infectious disease consulted, repeat cultures pending with no growth at time of discharge.     SIRS (systemic inflammatory response syndrome) (HCC)  Assessment & Plan  Met criteria with tachypnea, tachycardia secondary to acute bronchitis  No suspected source of bacterial infectioin    Type 2 diabetes mellitus with diabetic polyneuropathy, with long-term current use of insulin (HCC)  Assessment & Plan  Lab Results   Component Value Date    HGBA1C 6.4 06/23/2023       Recent Labs     02/13/24  2335 02/14/24  2032 02/15/24  2036   POCGLU 266* 218* 219*         Blood Sugar Average: Last 72 hrs:  (P) [id number] on insulin regimen  ISS      Primary hypertension  Assessment & Plan  Stable  Cont meds; not sure why on both HCTZ and lasix    Obesity, morbid (HCC)  Assessment & Plan  Lifestyle/dietary modification    * Acute respiratory failure with hypoxia (HCC)-resolved as of 2/16/2024  Assessment & Plan  Secondary to acute viral bronchitis  Pt with no formal dx of obstructive disease but has previous tobacco use though quit since 2000  Completed azithromycin. Transitioned from IV steroids to PO prednisone for short taper.  CXR negative, procalcitonin negative.   Neg COVID/Flu/RSV  Will need PFTs as an outpatient  Weaned off supplemental oxygen, ambulatory O2 sat normal range.  Will not qualify for home oxygen.      Medical Problems       Resolved Problems  Date Reviewed: 2/13/2024             Resolved    * (Principal) Acute respiratory failure with hypoxia (HCC) 2/16/2024     Resolved by  Raphael Kramer MD        Discharging Physician / Practitioner: Raphael Kramer MD  PCP: Linda Patton DO  Admission Date:   Admission Orders (From admission, onward)       Ordered        02/13/24 2019  INPATIENT ADMISSION  Once                          Discharge Date: 02/16/24    Consultations During Hospital Stay:  Infectious disease    Procedures Performed:   None    Significant Findings / Test Results:   Chest x-ray: No acute cardiopulmonary disease  Initial blood culture: Staphylococcus hominis    Incidental Findings:   None    Test Results Pending at Discharge (will require follow up):   Repeat blood cultures     Outpatient Tests Requested:  Pulmonary function testing to evaluate for COPD    Complications: None    Reason for Admission: Shortness of breath    Hospital Course:   Hattie Patton is a 68 y.o. female patient who originally presented to the hospital on 2/13/2024 due to shortness of breath.  Chest x-ray showed no evidence of bacterial pneumonia.  Procalcitonin was normal.  Patient was noted to be wheezy and given her smoking history suspicion was high for undiagnosed COPD versus bronchitis.  She was managed with IV azithromycin for 3 days, IV steroids with improvement in breathing symptoms.  She was initially requiring supplemental oxygen which was weaned to room air prior to discharge.  She passed an ambulatory O2 check and did not qualify for home O2.  She was discharged home on a short prednisone taper as well as albuterol as needed.    Patient's initial blood cultures did turn positive for Staphylococcus hominis.  Infectious disease was consulted as suspicion was high for contamination, but both sets of cultures were positive.  Suspect that sample was drawn from the same area/same time.  Repeat cultures with no growth to date at time of discharge but will need to be  "followed up.  Patient displayed no signs suggestive of bacteremia, so antibiotics were withheld.    Please see above list of diagnoses and related plan for additional information.     Condition at Discharge: good    Discharge Day Visit / Exam:   Subjective:  Patient reports breathing is better. Still wheezy and occasionally short of breath but passed ambulatory O2 check.   Vitals: Blood Pressure: 140/75 (02/16/24 0732)  Pulse: 63 (02/16/24 0732)  Temperature: 97.6 °F (36.4 °C) (02/16/24 0732)  Temp Source: Oral (02/14/24 2151)  Respirations: 18 (02/15/24 0833)  Height: 5' 2\" (157.5 cm) (02/14/24 1809)  Weight - Scale: 112 kg (248 lb) (02/14/24 1809)  SpO2: 92 % (02/16/24 1318)  Exam:   Physical Exam  Vitals and nursing note reviewed.   Constitutional:       Appearance: Normal appearance.   HENT:      Head: Normocephalic and atraumatic.      Mouth/Throat:      Mouth: Mucous membranes are moist.   Eyes:      Conjunctiva/sclera: Conjunctivae normal.      Pupils: Pupils are equal, round, and reactive to light.   Cardiovascular:      Rate and Rhythm: Normal rate and regular rhythm.      Pulses: Normal pulses.      Heart sounds: Normal heart sounds. No murmur heard.     No gallop.   Pulmonary:      Effort: Pulmonary effort is normal. No respiratory distress.      Breath sounds: No rales.      Comments: End expiratory wheezing  Abdominal:      General: Abdomen is flat. Bowel sounds are normal. There is no distension.      Palpations: Abdomen is soft.      Tenderness: There is no abdominal tenderness. There is no guarding or rebound.   Musculoskeletal:         General: No swelling. Normal range of motion.   Skin:     General: Skin is warm and dry.      Capillary Refill: Capillary refill takes less than 2 seconds.   Neurological:      General: No focal deficit present.      Mental Status: She is alert. Mental status is at baseline.   Psychiatric:         Mood and Affect: Mood normal.          Discussion with Family: Updated "  () at bedside.    Discharge instructions/Information to patient and family:   See after visit summary for information provided to patient and family.      Provisions for Follow-Up Care:  See after visit summary for information related to follow-up care and any pertinent home health orders.      Mobility at time of Discharge:   Basic Mobility Inpatient Raw Score: 19  JH-HLM Goal: 6: Walk 10 steps or more  JH-HLM Achieved: 7: Walk 25 feet or more  HLM Goal achieved. Continue to encourage appropriate mobility.     Disposition:   Home    Planned Readmission: None     Discharge Statement:  I spent 40 minutes discharging the patient. This time was spent on the day of discharge. I had direct contact with the patient on the day of discharge. Greater than 50% of the total time was spent examining patient, answering all patient questions, arranging and discussing plan of care with patient as well as directly providing post-discharge instructions.  Additional time then spent on discharge activities.    Discharge Medications:  See after visit summary for reconciled discharge medications provided to patient and/or family.      **Please Note: This note may have been constructed using a voice recognition system**

## 2024-02-16 NOTE — ASSESSMENT & PLAN NOTE
Lab Results   Component Value Date    HGBA1C 6.4 06/23/2023       Recent Labs     02/13/24  2335 02/14/24  2032 02/15/24  2036   POCGLU 266* 218* 219*         Blood Sugar Average: Last 72 hrs:  (P) [id number] on insulin regimen  ISS

## 2024-02-16 NOTE — PROGRESS NOTES
Progress Note - Infectious Disease   Hattie Patton 68 y.o. female MRN: [id number]  Unit/Bed#: -01 Encounter: [id number]      IMPRESSION & RECOMMENDATIONS:   Impression/Recommendations:  SIRS, POA.  Tachycardia, tachypnea.  Secondary to #3.  Doubt bacteremia, as stated below.  Patient remains afebrile and hemodynamically stable.  WBC count mildly up likely due to steroid.  -Continue close observation off IV antibiotic  -Follow temperatures and hemodynamics  Positive blood cultures.  Both sets of admission blood cultures now with growth of coagulase-negative staph.  After discussion with patient, it appears that both sets were drawn from same site.  High suspicion for contaminated specimens.  Patient remains afebrile off appropriate antibiotics.  She has no intravascular prosthetic devices.  No wounds or evidence of SSTI.  Repeat blood cultures are negative.  -Continue close observation off IV antibiotic given ongoing clinical stability  -Follow-up blood cultures until final.  -No further ID workup at this time  Acute hypoxic respiratory failure.  Suspected secondary to possible acute viral bronchitis with concern for underlying reactive airway disease.  Improved with nebulizer treatment, steroid.  No clinical or radiographic evidence to suggest acute bacterial infection.  Negative procalcitonin.  Now on room air.  -Management per primary service  -Follow respiratory symptoms/O2 requirements  Morbid obesity, with BMI of 45.  DM2, with long-term insulin use and polyneuropathy.  Risk factor for infection.        Antibiotics:  Azithromycin 4     I discussed above plan with patient and  at bedside, and with Dr. Kramer who agrees with continued observation off IV antibiotics.  Please contact our service with any new questions over the weekend.        Subjective:  Patient is worried about managing her breathing and inhalers at home.  Remains on room air.  No fevers.    Objective:  Vitals:  Temp:  [97.6 °F  (36.4 °C)-98.1 °F (36.7 °C)] 97.6 °F (36.4 °C)  HR:  [63-91] 63  BP: (115-152)/(64-75) 140/75  SpO2:  [91 %-97 %] 97 %  Temp (24hrs), Av.8 °F (36.6 °C), Min:97.6 °F (36.4 °C), Max:98.1 °F (36.7 °C)  Current: Temperature: 97.6 °F (36.4 °C)    Physical Exam:   General:  No acute distress, nontoxic  Atraumatic normocephalic  Neck trachea midline  Psychiatric:  Awake and alert  Pulmonary:  Normal respiratory excursion without accessory muscle use  Abdomen:  Soft, nontender, obese  Extremities: Stable edema  Skin:  No rashes  Neuro moves all extremities spontaneously    Lab Results:  I have personally reviewed pertinent labs.  Results from last 7 days   Lab Units 02/15/24  0547 24  0640 24  1644   POTASSIUM mmol/L 3.8 4.2 3.6   CHLORIDE mmol/L 106 105 106   CO2 mmol/L 30 27 27   BUN mg/dL 27* 17 12   CREATININE mg/dL 0.84 0.74 0.88   EGFR ml/min/1.73sq m 72 84 68   CALCIUM mg/dL 9.4 9.2 9.7   AST U/L  --   --  20   ALT U/L  --   --  19   ALK PHOS U/L  --   --  93     Results from last 7 days   Lab Units 24  0451 02/15/24  0547 24  0640   WBC Thousand/uL 8.43 10.66* 4.03*   HEMOGLOBIN g/dL 13.4 13.1 13.4   PLATELETS Thousands/uL 219 222 218     Results from last 7 days   Lab Units 02/15/24  0545 24  2132 24  2031   BLOOD CULTURE  No Growth at 24 hrs.  No Growth at 24 hrs. Staphylococcus coagulase negative*  --    GRAM STAIN RESULT   --  Gram positive cocci in clusters* Gram positive cocci in clusters*       Imaging Studies:   I have personally reviewed pertinent imaging study reports and images in PACS.    EKG, Pathology, and Other Studies:   I have personally reviewed pertinent reports.

## 2024-02-16 NOTE — RESPIRATORY THERAPY NOTE
RT Protocol Note  Hattie Patton 68 y.o. female MRN: [id number]  Unit/Bed#: -01 Encounter: [id number]    Assessment    Active Problems:    Obesity, morbid (HCC)    Primary hypertension    Type 2 diabetes mellitus with diabetic polyneuropathy, with long-term current use of insulin (HCC)    SIRS (systemic inflammatory response syndrome) (HCC)    Positive blood culture      Home Pulmonary Medications:     24 0716   Respiratory Protocol   Protocol Initiated? No   Protocol Selection Respiratory   Language Barrier? No   Medical & Social History Reviewed? Yes   Diagnostic Studies Reviewed? Yes   Physical Assessment Performed? Yes   Respiratory Plan Mild Distress pathway   Respiratory Assessment   Assessment Type Pre-treatment   General Appearance Alert;Awake   Respiratory Pattern Labored   Chest Assessment Chest expansion symmetrical   Bilateral Breath Sounds Coarse   Resp Comments continue nebs as ordered   O2 Device ra   Additional Assessments   SpO2 92 %            Past Medical History:   Diagnosis Date    Cancer (HCC)     Diabetes mellitus (HCC)     Diverticulitis of colon     Hypertension      Social History     Socioeconomic History    Marital status: /Civil Union     Spouse name: None    Number of children: None    Years of education: None    Highest education level: None   Occupational History    None   Tobacco Use    Smoking status: Former     Current packs/day: 0.00     Types: Cigarettes     Quit date: 2000     Years since quittin.2    Smokeless tobacco: Former   Vaping Use    Vaping status: Never Used   Substance and Sexual Activity    Alcohol use: Never     Comment: socially - rare    Drug use: Yes     Frequency: 7.0 times per week     Types: Marijuana     Comment: every day medical marijuana, oil or smoke    Sexual activity: Not Currently     Partners: Male     Birth control/protection: None   Other Topics Concern    None   Social History Narrative    None     Social Determinants  "of Health     Financial Resource Strain: High Risk (10/16/2023)    Overall Financial Resource Strain (CARDIA)     Difficulty of Paying Living Expenses: Hard   Food Insecurity: No Food Insecurity (2/15/2024)    Hunger Vital Sign     Worried About Running Out of Food in the Last Year: Never true     Ran Out of Food in the Last Year: Never true   Transportation Needs: No Transportation Needs (2/15/2024)    PRAPARE - Transportation     Lack of Transportation (Medical): No     Lack of Transportation (Non-Medical): No   Physical Activity: Not on file   Stress: Stress Concern Present (10/28/2022)    [employer] - Occupational Stress Questionnaire     Feeling of Stress : Rather much   Social Connections: Not on file   Intimate Partner Violence: Not on file   Housing Stability: Low Risk  (2/15/2024)    Housing Stability Vital Sign     Unable to Pay for Housing in the Last Year: No     Number of Places Lived in the Last Year: 1     Unstable Housing in the Last Year: No       Subjective         Objective    Physical Exam:   Assessment Type: Pre-treatment  General Appearance: Alert, Awake  Respiratory Pattern: Labored  Chest Assessment: Chest expansion symmetrical  Bilateral Breath Sounds: Coarse  O2 Device: ra    Vitals:  Blood pressure 140/75, pulse 63, temperature 97.6 °F (36.4 °C), resp. rate 18, height 5' 2\" (1.575 m), weight 112 kg (248 lb), SpO2 97%.          Imaging and other studies: I have personally reviewed pertinent reports.      O2 Device: ra     Plan    Respiratory Plan: Mild Distress pathway        Resp Comments: continue nebs as ordered   "

## 2024-02-16 NOTE — CASE MANAGEMENT
Case Management Progress Note    Patient name Hattie Patton  Location /-01 MRN [id number]  : 1955 Date 2024       LOS (days): 3  Geometric Mean LOS (GMLOS) (days): 3.6  Days to GMLOS:0.9        OBJECTIVE:        Current admission status: Inpatient  Preferred Pharmacy:   [facility] - [place], [place] - [address]  [address]  [facility] [address]  [facility] [address]  Phone: [phone] Fax: [phone]    [id number] - [place], [place] - [facility] [address]  Phone: [phone] Fax: [phone]    Primary Care Provider: Linda Patton DO    Primary Insurance: MEDICARE  Secondary Insurance: AETNA    PROGRESS NOTE:  Spoke with patient and  at bedside. Refused walker through [place]. Made aware [facility] accepted for [facility] and can start tomorrow, should be calling them this afternoon or tomorrow. Additionally, made referral to Meals on Wheels and gave information for [employer], [employer], and [employer] delivery programs. Patient and  verbalized understanding, had no questions. Discharging today.

## 2024-02-16 NOTE — ASSESSMENT & PLAN NOTE
Secondary to acute viral bronchitis  Pt with no formal dx of obstructive disease but has previous tobacco use though quit since 2000  Completed azithromycin. Transitioned from IV steroids to PO prednisone for short taper.  CXR negative, procalcitonin negative.   Neg COVID/Flu/RSV  Will need PFTs as an outpatient  Weaned off supplemental oxygen, ambulatory O2 sat normal range.  Will not qualify for home oxygen.

## 2024-02-16 NOTE — CASE MANAGEMENT
Case Management Progress Note    Patient name Hattie Patton  Location /-01 MRN [id number]  : 1955 Date 2024       LOS (days): 3  Geometric Mean LOS (GMLOS) (days): 3.6  Days to GMLOS:1.1        OBJECTIVE:        Current admission status: Inpatient  Preferred Pharmacy:   [facility] - [place], [place] - [address]  [address] [address]  Phone: [phone] Fax: [phone]    [id number] - [address] [address]  Phone: [phone] Fax: [phone]    Primary Care Provider: Linda Patton DO    Primary Insurance: MEDICARE  Secondary Insurance: AETNA    PROGRESS NOTE:  Referral for Meals on Wheels made for patient, printed information for Meals on Wheels, [employer] Meals, and [employer] for patient and spouse to review.

## 2024-02-16 NOTE — PLAN OF CARE
Problem: Potential for Falls  Goal: Patient will remain free of falls  Description: INTERVENTIONS:  - Educate patient/family on patient safety including physical limitations  - Instruct patient to call for assistance with activity   - Consult OT/PT to assist with strengthening/mobility   - Keep Call bell within reach  - Keep bed low and locked with side rails adjusted as appropriate  - Keep care items and personal belongings within reach  - Initiate and maintain comfort rounds  - Make Fall Risk Sign visible to staff  - Apply yellow socks and bracelet for high fall risk patients  - Consider moving patient to room near nurses station  Outcome: Progressing     Problem: RESPIRATORY - ADULT  Goal: Achieves optimal ventilation and oxygenation  Description: INTERVENTIONS:  - Assess for changes in respiratory status  - Assess for changes in mentation and behavior  - Position to facilitate oxygenation and minimize respiratory effort  - Oxygen administered by appropriate delivery if ordered  - Initiate smoking cessation education as indicated  - Encourage broncho-pulmonary hygiene including cough, deep breathe, Incentive Spirometry  - Assess the need for suctioning and aspirate as needed  - Assess and instruct to report SOB or any respiratory difficulty  - Respiratory Therapy support as indicated  Outcome: Progressing

## 2024-02-16 NOTE — PLAN OF CARE
Problem: Potential for Falls  Goal: Patient will remain free of falls  Description: INTERVENTIONS:  - Educate patient/family on patient safety including physical limitations  - Instruct patient to call for assistance with activity   - Consult OT/PT to assist with strengthening/mobility   - Keep Call bell within reach  - Keep bed low and locked with side rails adjusted as appropriate  - Keep care items and personal belongings within reach  - Initiate and maintain comfort rounds  - Make Fall Risk Sign visible to staff  - Offer Toileting every 2 Hours, in advance of need  - Initiate/Maintain chair alarm  - Apply yellow socks and bracelet for high fall risk patients  - Consider moving patient to room near nurses station  Outcome: Progressing     Problem: RESPIRATORY - ADULT  Goal: Achieves optimal ventilation and oxygenation  Description: INTERVENTIONS:  - Assess for changes in respiratory status  - Assess for changes in mentation and behavior  - Position to facilitate oxygenation and minimize respiratory effort  - Oxygen administered by appropriate delivery if ordered  - Initiate smoking cessation education as indicated  - Encourage broncho-pulmonary hygiene including cough, deep breathe, Incentive Spirometry  - Assess the need for suctioning and aspirate as needed  - Assess and instruct to report SOB or any respiratory difficulty  - Respiratory Therapy support as indicated  Outcome: Progressing

## 2024-02-16 NOTE — DISCHARGE INSTR - AVS FIRST PAGE
Follow up with your primary care doctor. They will have to order pulmonary function testing to see if you have COPD or any other breathing issue chronically. Complete prednisone taper and use albuterol as needed.

## 2024-02-16 NOTE — ASSESSMENT & PLAN NOTE
Blood cultures from admission growing coagulase-negative staph in both.  Suspicion that both cultures were drawn from the same site.  No evidence to suggest a true bacteremia currently.  Holding on antibiotics tailored for this.  Infectious disease consulted, repeat cultures pending with no growth at time of discharge.

## 2024-02-17 LAB
BACTERIA BLD CULT: ABNORMAL
BACTERIA BLD CULT: ABNORMAL
GRAM STN SPEC: ABNORMAL
GRAM STN SPEC: ABNORMAL
S AUREUS+CONS DNA BLD POS NAA+NON-PROBE: DETECTED

## 2024-02-18 LAB
ATRIAL RATE: 85 BPM
BACTERIA BLD CULT: NORMAL
BACTERIA BLD CULT: NORMAL
P AXIS: 75 DEGREES
PR INTERVAL: 140 MS
QRS AXIS: 44 DEGREES
QRSD INTERVAL: 70 MS
QT INTERVAL: 360 MS
QTC INTERVAL: 428 MS
T WAVE AXIS: 26 DEGREES
VENTRICULAR RATE: 85 BPM

## 2024-02-18 PROCEDURE — 93010 ELECTROCARDIOGRAM REPORT: CPT | Performed by: INTERNAL MEDICINE

## 2024-02-19 ENCOUNTER — TRANSITIONAL CARE MANAGEMENT (OUTPATIENT)
Age: 69
End: 2024-02-19

## 2024-02-20 LAB
BACTERIA BLD CULT: NORMAL
BACTERIA BLD CULT: NORMAL

## 2024-02-23 ENCOUNTER — OFFICE VISIT (OUTPATIENT)
Age: 69
End: 2024-02-23
Payer: MEDICARE

## 2024-02-23 VITALS
SYSTOLIC BLOOD PRESSURE: 132 MMHG | TEMPERATURE: 97.5 F | OXYGEN SATURATION: 99 % | RESPIRATION RATE: 18 BRPM | HEART RATE: 52 BPM | DIASTOLIC BLOOD PRESSURE: 82 MMHG | WEIGHT: 253.4 LBS | BODY MASS INDEX: 46.35 KG/M2

## 2024-02-23 DIAGNOSIS — Z79.4 TYPE 2 DIABETES MELLITUS WITH DIABETIC POLYNEUROPATHY, WITH LONG-TERM CURRENT USE OF INSULIN (HCC): ICD-10-CM

## 2024-02-23 DIAGNOSIS — R65.10 SIRS (SYSTEMIC INFLAMMATORY RESPONSE SYNDROME) (HCC): Primary | ICD-10-CM

## 2024-02-23 DIAGNOSIS — F32.A ANXIETY AND DEPRESSION: ICD-10-CM

## 2024-02-23 DIAGNOSIS — R78.81 POSITIVE BLOOD CULTURE: ICD-10-CM

## 2024-02-23 DIAGNOSIS — J40 BRONCHITIS: ICD-10-CM

## 2024-02-23 DIAGNOSIS — I10 PRIMARY HYPERTENSION: ICD-10-CM

## 2024-02-23 DIAGNOSIS — F41.9 ANXIETY AND DEPRESSION: ICD-10-CM

## 2024-02-23 DIAGNOSIS — E11.42 TYPE 2 DIABETES MELLITUS WITH DIABETIC POLYNEUROPATHY, WITH LONG-TERM CURRENT USE OF INSULIN (HCC): ICD-10-CM

## 2024-02-23 PROBLEM — J44.1 COPD EXACERBATION (HCC): Status: RESOLVED | Noted: 2024-02-13 | Resolved: 2024-02-23

## 2024-02-23 LAB — SL AMB POCT HEMOGLOBIN AIC: 8.2 (ref ?–6.5)

## 2024-02-23 PROCEDURE — 99495 TRANSJ CARE MGMT MOD F2F 14D: CPT | Performed by: FAMILY MEDICINE

## 2024-02-23 PROCEDURE — 83036 HEMOGLOBIN GLYCOSYLATED A1C: CPT | Performed by: FAMILY MEDICINE

## 2024-02-23 RX ORDER — GLIPIZIDE 10 MG/1
10 TABLET, FILM COATED, EXTENDED RELEASE ORAL DAILY
Qty: 90 TABLET | Refills: 1 | Status: SHIPPED | OUTPATIENT
Start: 2024-02-23

## 2024-02-23 RX ORDER — ALBUTEROL SULFATE 90 UG/1
2 AEROSOL, METERED RESPIRATORY (INHALATION) EVERY 4 HOURS PRN
Qty: 6.7 G | Refills: 0 | Status: SHIPPED | OUTPATIENT
Start: 2024-02-23

## 2024-02-23 RX ORDER — BUPROPION HYDROCHLORIDE 150 MG/1
150 TABLET ORAL EVERY MORNING
Qty: 30 TABLET | Refills: 2 | Status: SHIPPED | OUTPATIENT
Start: 2024-02-23 | End: 2024-08-21

## 2024-02-23 NOTE — PROGRESS NOTES
Assessment & Plan     1. SIRS (systemic inflammatory response syndrome) (HCC) thought to be secondary to viral bronchitis.  Has since resolved.    2. Bronchitis-resolving.  Mild breathlessness.  Some wheezing still noted on exam.  Patient encouraged to continue as needed albuterol use.  Will follow-up on PFTs to determine if this was acute versus chronic bronchitis to the patient's tobacco use history.  -     Complete PFT with post bronchodilator; Future  -     albuterol (PROVENTIL HFA,VENTOLIN HFA) 90 mcg/act inhaler; Inhale 2 puffs every 4 (four) hours as needed for wheezing    3. Positive blood culture-false positive.  Probable skin contaminant.  Follow-up cultures were negative     4. Type 2 diabetes mellitus with diabetic polyneuropathy, with long-term current use of insulin ([facility])-poorly controlled with a POC A1c of 8.2.  Patient admits to poor dieting.  Will continue with Toujeo 44 units q. nightly and increase glipizide to 10 units daily.  Dietary changes strongly encouraged  -     POCT hemoglobin A1c  -     glipiZIDE (GLUCOTROL XL) 10 mg 24 hr tablet; Take 1 tablet (10 mg total) by mouth daily    5. Primary hypertension-stable on current antihypertensive.  Patient to continue hydrochlorothiazide 25 mg daily and Lasix 20 mg daily    6. Anxiety and depression-not well-controlled.  Currently on Cymbalta 60 mg daily.  Will follow-up the trend to help better manage symptoms.  Patient discussed amendable to talk therapy.  Referral placed.  -     buPROPion (WELLBUTRIN XL) 150 mg 24 hr tablet; Take 1 tablet (150 mg total) by mouth every morning         Subjective     Transitional Care Management Review:   Hattie Patton is a 68 y.o. female here for TCM follow up.     During the TCM phone call patient stated:  TCM Call       Date and time call was made  2/19/2024 10:13 AM    Hospital care reviewed  Records reviewed    Patient was hospitialized at  [facility]    Date of Admission  02/13/24    Date of discharge   02/16/24    Diagnosis  Acute respiratory failure with hypoxia (HCC)    Disposition  Home    Were the patients medications reviewed and updated  Yes    Current Symptoms  None          TCM Call       Post hospital issues  None    Should patient be enrolled in anticoag monitoring?  No    Scheduled for follow up?  Yes    Did you obtain your prescribed medications  Yes    Do you need help managing your prescriptions or medications  No    Is transportation to your appointment needed  No    I have advised the patient to call PCP with any new or worsening symptoms  SHANNON Gould    Living Arrangements  Spouse or Significiant other    Are you recieving any outpatient services  No    Are you recieving home care services  No    Are you using any community resources  No    Counseling  Patient    Counseling topics  Activities of daily living; Importance of RX compliance          Patient presents for posthospital follow-up.  Admitted for hypoxia.  Thought to be secondary to bronchitis.  Unclear if acute versus chronic.  Since being home she has finished her steroid taper.  Continues to feel little bit winded.  Continues to use albuterol.  Discussed diabetes.  A1c is increased.  Patient admits to having very poor diet.  Mood has been very poor.  Grieving over the loss of a pet about a year ago.  Discussed anxiety and depression as it relates to her ability to self-care.      Review of Systems    Objective     /82 (BP Location: Left arm, Patient Position: Sitting, Cuff Size: Large)   Pulse (!) 52   Temp 97.5 °F (36.4 °C) (Temporal)   Resp 18   Wt 115 kg (253 lb 6.4 oz)   SpO2 99%   BMI 46.35 kg/m²      Physical Exam  HENT:      Head: Normocephalic.      Right Ear: External ear normal.      Left Ear: External ear normal.   Eyes:      Conjunctiva/sclera: Conjunctivae normal.      Pupils: Pupils are equal, round, and reactive to light.   Cardiovascular:      Rate and Rhythm: Normal rate and regular rhythm.    Pulmonary:      Effort: Pulmonary effort is normal.      Breath sounds: Wheezing present.   Musculoskeletal:      Right lower leg: No edema.      Left lower leg: No edema.   Neurological:      Mental Status: She is alert and oriented to person, place, and time.   Psychiatric:         Attention and Perception: Attention normal.         Mood and Affect: Mood is depressed. Affect is tearful.         Speech: Speech normal.         Thought Content: Thought content does not include suicidal ideation. Thought content does not include suicidal plan.       Medications have been reviewed by provider in current encounter    Linda Patton DO          room air

## 2024-02-25 NOTE — ED PROVIDER NOTES
History  Chief Complaint   Patient presents with    Shortness of Breath     Pt with cough/cold for a little over a week.  Pt states now she feels like its in her chest and having difficulty breathing.      68-year-old female presenting to the emergency department for evaluation of shortness of breath.  Patient has had cold/flu symptoms for about a week, now it is in her chest, subtle, has some chest tightness, and worsening shortness of breath over the past 1 to 2 days.  She has a intermittently productive cough.  No fevers, sometimes chills.  No abdominal pain shortness of breath nausea or vomiting.        Prior to Admission Medications   Prescriptions Last Dose Informant Patient Reported? Taking?   Continuous Blood Gluc  (FreeStyle Mark 2 [place]) SAGE Not Taking  No No   Sig: Check blood sugars multiple times per day   Patient not taking: Reported on 2/14/2024   Continuous Blood Gluc Sensor (FreeStyle Mark 2 Sensor) MISC 2/13/2024  No Yes   Sig: Check blood sugars multiple times per day   DULoxetine (CYMBALTA) 60 mg delayed release capsule 2/13/2024  No Yes   Sig: TAKE 1 CAPSULE BY MOUTH DAILY   atorvastatin (LIPITOR) 40 mg tablet 2/13/2024  No Yes   Sig: TAKE 1 TABLET BY MOUTH ONCE  DAILY   fexofenadine (ALLEGRA) 180 MG tablet 2/13/2024 Self Yes Yes   Sig: Take 180 mg by mouth daily   furosemide (LASIX) 40 mg tablet 2/13/2024 Self Yes Yes   Sig: Take 40 mg by mouth daily   gabapentin (NEURONTIN) 300 mg capsule 2/13/2024  No Yes   Sig: TAKE 2 CAPSULES BY MOUTH 3 TIMES DAILY   hydroCHLOROthiazide 25 mg tablet 2/13/2024  No Yes   Sig: Take 1 tablet (25 mg total) by mouth daily   insulin glargine (Toujeo Max SoloStar) 300 units/mL CONCENTRATED U-300 injection pen (2-unit dial) 2/13/2024  No Yes   Sig: Inject 45 Units under the skin daily at bedtime   Patient taking differently: Inject 44 Units under the skin daily at bedtime   omeprazole (PriLOSEC) 40 MG capsule 2/13/2024  No Yes   Sig: TAKE 1 CAPSULE BY  MOUTH DAILY      Facility-Administered Medications: None       Past Medical History:   Diagnosis Date    Cancer (HCC)     Diabetes mellitus (HCC)     Diverticulitis of colon     Hypertension        Past Surgical History:   Procedure Laterality Date    CHOLECYSTECTOMY      COLONOSCOPY      GALLBLADDER SURGERY      HYSTERECTOMY      UMBILICAL HERNIA REPAIR         Family History   Problem Relation Age of Onset    Heart disease Mother     Cancer Father     Hypertension Father     Cancer Sister     Mental illness Sister     Cancer Paternal Aunt     Diabetes Brother      I have reviewed and agree with the history as documented.    E-Cigarette/Vaping    E-Cigarette Use Never User      E-Cigarette/Vaping Substances    Nicotine No     THC No     CBD No     Flavoring No     Other No     Unknown No      Social History     Tobacco Use    Smoking status: Former     Current packs/day: 0.00     Types: Cigarettes     Quit date: 2000     Years since quittin.2    Smokeless tobacco: Former   Vaping Use    Vaping status: Never Used   Substance Use Topics    Alcohol use: Never     Comment: socially - rare    Drug use: Yes     Frequency: 7.0 times per week     Types: Marijuana     Comment: every day medical marijuana, oil or smoke       Review of Systems   Constitutional:  Positive for chills and fatigue. Negative for appetite change and fever.   HENT:  Negative for sneezing and sore throat.    Eyes:  Negative for visual disturbance.   Respiratory:  Positive for cough and shortness of breath. Negative for choking, chest tightness and wheezing.    Cardiovascular:  Positive for chest pain. Negative for palpitations.   Gastrointestinal:  Negative for abdominal pain, constipation, diarrhea, nausea and vomiting.   Genitourinary:  Negative for difficulty urinating and dysuria.   Neurological:  Negative for dizziness, weakness, light-headedness, numbness and headaches.   All other systems reviewed and are negative.      Physical  Exam  Physical Exam  Constitutional:       General: She is not in acute distress.     Appearance: She is well-developed. She is not diaphoretic.   HENT:      Head: Normocephalic and atraumatic.   Eyes:      Pupils: Pupils are equal, round, and reactive to light.   Neck:      Vascular: No JVD.      Trachea: No tracheal deviation.   Cardiovascular:      Rate and Rhythm: Normal rate and regular rhythm.      Heart sounds: Normal heart sounds. No murmur heard.     No friction rub. No gallop.   Pulmonary:      Effort: Pulmonary effort is normal. No respiratory distress.      Breath sounds: Decreased breath sounds present. No wheezing or rales.   Abdominal:      General: Bowel sounds are normal. There is no distension.      Palpations: Abdomen is soft.      Tenderness: There is no abdominal tenderness. There is no guarding or rebound.   Skin:     General: Skin is warm and dry.      Coloration: Skin is not pale.   Neurological:      Mental Status: She is alert and oriented to person, place, and time.      Cranial Nerves: No cranial nerve deficit.      Motor: No abnormal muscle tone.   Psychiatric:         Behavior: Behavior normal.         Vital Signs  ED Triage Vitals   Temperature Pulse Respirations Blood Pressure SpO2   02/13/24 1615 02/13/24 1615 02/13/24 1615 02/13/24 1615 02/13/24 1615   98 °F (36.7 °C) 102 18 (!) 199/88 94 %      Temp Source Heart Rate Source Patient Position - Orthostatic VS BP Location FiO2 (%)   02/13/24 1615 02/13/24 1615 02/13/24 1615 02/13/24 1615 --   Temporal Monitor Sitting Left arm       Pain Score       02/14/24 0000       No Pain           Vitals:    02/15/24 1452 02/15/24 2053 02/15/24 2204 02/16/24 0732   BP: 115/64 127/68 152/65 140/75   Pulse: 91 84 77 63   Patient Position - Orthostatic VS:             Visual Acuity  Visual Acuity      Flowsheet Row Most Recent Value   L Pupil Size (mm) 3   R Pupil Size (mm) 3   L Pupil Shape Round   R Pupil Shape Round            ED  Medications  Medications   albuterol inhalation solution 5 mg (5 mg Nebulization Given 2/13/24 1701)   ipratropium (ATROVENT) 0.02 % inhalation solution 0.5 mg (0.5 mg Nebulization Given 2/13/24 1702)   predniSONE tablet 40 mg (40 mg Oral Given 2/13/24 1701)   albuterol inhalation solution 5 mg (5 mg Nebulization Given 2/13/24 1759)   ipratropium (ATROVENT) 0.02 % inhalation solution 0.5 mg (0.5 mg Nebulization Given 2/13/24 1759)   magnesium sulfate 2 g/50 mL IVPB (premix) 2 g (0 g Intravenous Stopped 2/13/24 2153)   doxycycline (VIBRAMYCIN) 100 mg in sodium chloride 0.9 % 100 mL IVPB (0 mg Intravenous Stopped 2/13/24 2325)   azithromycin (ZITHROMAX) tablet 500 mg (500 mg Oral Given 2/16/24 0748)   ketorolac (TORADOL) injection 15 mg (15 mg Intravenous Given 2/14/24 1946)       Diagnostic Studies  Results Reviewed       Procedure Component Value Units Date/Time    Blood culture [[id number]]  (Abnormal)  (Susceptibility) Collected: 02/13/24 2132    Lab Status: Edited Result - FINAL Specimen: Blood from Arm, Left Updated: 02/17/24 1423     Blood Culture Staphylococcus hominis     Gram Stain Result Gram positive cocci in clusters    Susceptibility       Staphylococcus hominis (1)       Antibiotic Interpretation Microscan   Method Status    ZID Performed  Yes  LEYDI Final    Cefazolin ($) Susceptible <=8.00 ug/ml LEYDI Final    Clindamycin ($) Susceptible <=0.25 ug/ml LEYDI Final    Erythromycin ($$$$) Resistant >4.00 ug/ml LEYDI Final    Gentamicin ($$) Susceptible <=4 ug/ml LEYDI Final    Oxacillin Susceptible <=0.25 ug/ml LEYDI Final    Penicillin Resistant <=0.030 ug/ml LEYDI Final    Tetracycline Susceptible <=4 ug/ml LEYDI Final    Trimethoprim + Sulfamethoxazole ($$$) Susceptible <=0.5/9.5 ug/ml LEYDI Final    Vancomycin ($) Susceptible 1.00 ug/ml LEYDI Final                       Blood Culture Identification Panel [[id number]]  (Abnormal) Collected: 02/13/24 2132    Lab Status: Edited Result - FINAL Specimen: Blood from Arm, Left  Updated: 02/17/24 1423     Staphylococcus Detected    Narrative:      Routine culture and susceptiblity to follow for confirmation.    Film Array panel tests for 11 gram positive organisms, 15 gram negative organisms, 7 yeast species and 10 resistance genes.     Blood culture [[id number]]  (Abnormal)  (Susceptibility) Collected: 02/13/24 2031    Lab Status: Final result Specimen: Blood from Arm, Left Updated: 02/17/24 1423     Blood Culture Staphylococcus hominis     Gram Stain Result Gram positive cocci in clusters    Susceptibility       Staphylococcus hominis (1)       Antibiotic Interpretation Microscan Method Status    ZID Performed  Yes LEYDI Final                       Basic metabolic panel [[id number]]  (Abnormal) Collected: 02/15/24 0547    Lab Status: Final result Specimen: Blood from Arm, Right Updated: 02/15/24 0641     Sodium 142 mmol/L      Potassium 3.8 mmol/L      Chloride 106 mmol/L      CO2 30 mmol/L      ANION GAP 6 mmol/L      BUN 27 mg/dL      Creatinine 0.84 mg/dL      Glucose 218 mg/dL      Calcium 9.4 mg/dL      eGFR 72 ml/min/1.73sq m     Narrative:      National Kidney Disease Foundation guidelines for Chronic Kidney Disease (CKD):     Stage 1 with normal or high GFR (GFR > 90 mL/min/1.73 square meters)    Stage 2 Mild CKD (GFR = 60-89 mL/min/1.73 square meters)    Stage 3A Moderate CKD (GFR = 45-59 mL/min/1.73 square meters)    Stage 3B Moderate CKD (GFR = 30-44 mL/min/1.73 square meters)    Stage 4 Severe CKD (GFR = 15-29 mL/min/1.73 square meters)    Stage 5 End Stage CKD (GFR <15 mL/min/1.73 square meters)  Note: GFR calculation is accurate only with a steady state creatinine    CBC [[id number]]  (Abnormal) Collected: 02/15/24 0547    Lab Status: Final result Specimen: Blood from Arm, Right Updated: 02/15/24 0556     WBC 10.66 Thousand/uL      RBC 4.80 Million/uL      Hemoglobin 13.1 g/dL      Hematocrit 41.7 %      MCV 87 fL      MCH 27.3 pg      MCHC 31.4 g/dL      RDW 14.6 %       Platelets 222 Thousands/uL      MPV 10.4 fL     Procalcitonin [[id number]]  (Normal) Collected: 02/14/24 0640    Lab Status: Final result Specimen: Blood from Arm, Left Updated: 02/14/24 0805     Procalcitonin 0.07 ng/ml     Basic metabolic panel [[id number]]  (Abnormal) Collected: 02/14/24 0640    Lab Status: Final result Specimen: Blood from Arm, Left Updated: 02/14/24 0702     Sodium 140 mmol/L      Potassium 4.2 mmol/L      Chloride 105 mmol/L      CO2 27 mmol/L      ANION GAP 8 mmol/L      BUN 17 mg/dL      Creatinine 0.74 mg/dL      Glucose 283 mg/dL      Calcium 9.2 mg/dL      eGFR 84 ml/min/1.73sq m     Narrative:      National Kidney Disease Foundation guidelines for Chronic Kidney Disease (CKD):     Stage 1 with normal or high GFR (GFR > 90 mL/min/1.73 square meters)    Stage 2 Mild CKD (GFR = 60-89 mL/min/1.73 square meters)    Stage 3A Moderate CKD (GFR = 45-59 mL/min/1.73 square meters)    Stage 3B Moderate CKD (GFR = 30-44 mL/min/1.73 square meters)    Stage 4 Severe CKD (GFR = 15-29 mL/min/1.73 square meters)    Stage 5 End Stage CKD (GFR <15 mL/min/1.73 square meters)  Note: GFR calculation is accurate only with a steady state creatinine    CBC (With Platelets) [[id number]]  (Abnormal) Collected: 02/14/24 0640    Lab Status: Final result Specimen: Blood from Arm, Left Updated: 02/14/24 0644     WBC 4.03 Thousand/uL      RBC 4.89 Million/uL      Hemoglobin 13.4 g/dL      Hematocrit 42.0 %      MCV 86 fL      MCH 27.4 pg      MCHC 31.9 g/dL      RDW 14.5 %      Platelets 218 Thousands/uL      MPV 10.1 fL     Fingerstick Glucose (POCT) [[id number]]  (Abnormal) Collected: 02/13/24 2335    Lab Status: Final result Updated: 02/13/24 2336     POC Glucose 266 mg/dl     HS Troponin I 2hr [[id number]]  (Normal) Collected: 02/13/24 1858    Lab Status: Final result Specimen: Blood from Arm, Right Updated: 02/13/24 1929     hs TnI 2hr 3 ng/L      Delta 2hr hsTnI -5 ng/L     HS Troponin 0hr (reflex protocol)  [[id number]]  (Normal) Collected: 02/13/24 1644    Lab Status: Final result Specimen: Blood from Arm, Left Updated: 02/13/24 1714     hs TnI 0hr 8 ng/L     Comprehensive metabolic panel [[id number]] Collected: 02/13/24 1644    Lab Status: Final result Specimen: Blood from Arm, Left Updated: 02/13/24 1711     Sodium 143 mmol/L      Potassium 3.6 mmol/L      Chloride 106 mmol/L      CO2 27 mmol/L      ANION GAP 10 mmol/L      BUN 12 mg/dL      Creatinine 0.88 mg/dL      Glucose 121 mg/dL      Calcium 9.7 mg/dL      AST 20 U/L      ALT 19 U/L      Alkaline Phosphatase 93 U/L      Total Protein 8.0 g/dL      Albumin 4.4 g/dL      Total Bilirubin 0.56 mg/dL      eGFR 68 ml/min/1.73sq m     Narrative:      National Kidney Disease Foundation guidelines for Chronic Kidney Disease (CKD):     Stage 1 with normal or high GFR (GFR > 90 mL/min/1.73 square meters)    Stage 2 Mild CKD (GFR = 60-89 mL/min/1.73 square meters)    Stage 3A Moderate CKD (GFR = 45-59 mL/min/1.73 square meters)    Stage 3B Moderate CKD (GFR = 30-44 mL/min/1.73 square meters)    Stage 4 Severe CKD (GFR = 15-29 mL/min/1.73 square meters)    Stage 5 End Stage CKD (GFR <15 mL/min/1.73 square meters)  Note: GFR calculation is accurate only with a steady state creatinine    COVID/FLU/RSV [[id number]]  (Normal) Collected: 02/13/24 1618    Lab Status: Final result Specimen: Nares from Nose Updated: 02/13/24 1701     SARS-CoV-2 Negative     INFLUENZA A PCR Negative     INFLUENZA B PCR Negative     RSV PCR Negative    Narrative:      FOR PEDIATRIC PATIENTS - copy/paste COVID Guidelines URL to browser: [email or web address]    SARS-CoV-2 assay is a Nucleic Acid Amplification assay intended for the  qualitative detection of nucleic acid from SARS-CoV-2 in nasopharyngeal  swabs. Results are for the presumptive identification of SARS-CoV-2 RNA.    Positive results are indicative of infection with SARS-CoV-2, the  virus  causing COVID-19, but do not rule out bacterial infection or co-infection  with other viruses. Laboratories within the [place] and its  territories are required to report all positive results to the appropriate  public health authorities. Negative results do not preclude SARS-CoV-2  infection and should not be used as the sole basis for treatment or other  patient management decisions. Negative results must be combined with  clinical observations, patient history, and epidemiological information.  This test has not been FDA cleared or approved.    This test has been authorized by FDA under an Emergency Use Authorization  (EUA). This test is only authorized for the duration of time the  declaration that circumstances exist justifying the authorization of the  emergency use of an in vitro diagnostic tests for detection of SARS-CoV-2  virus and/or diagnosis of COVID-19 infection under section 564(b)(1) of  the Act, 21 U.S.C. 360bbb-3(b)(1), unless the authorization is terminated  or revoked sooner. The test has been validated but independent review by FDA  and CLIA is pending.    Test performed using [email or web address] GeneXpert: This RT-PCR assay targets N2,  a region unique to SARS-CoV-2. A conserved region in the E-gene was chosen  for pan-Sarbecovirus detection which includes SARS-CoV-2.    According to CMS-2020-01-R, this platform meets the definition of high-throughput technology.    CBC and differential [[id number]]  (Abnormal) Collected: 02/13/24 1644    Lab Status: Final result Specimen: Blood from Arm, Left Updated: 02/13/24 1652     WBC 8.40 Thousand/uL      RBC 5.64 Million/uL      Hemoglobin 15.4 g/dL      Hematocrit 48.6 %      MCV 86 fL      MCH 27.3 pg      MCHC 31.7 g/dL      RDW 14.4 %      MPV 9.9 fL      Platelets 252 Thousands/uL      nRBC 0 /100 WBCs      Neutrophils Relative 62 %      Immat GRANS % 0 %      Lymphocytes Relative 22 %      Monocytes Relative 8 %      Eosinophils Relative 7 %       Basophils Relative 1 %      Neutrophils Absolute 5.17 Thousands/µL      Immature Grans Absolute 0.03 Thousand/uL      Lymphocytes Absolute 1.87 Thousands/µL      Monocytes Absolute 0.70 Thousand/µL      Eosinophils Absolute 0.58 Thousand/µL      Basophils Absolute 0.05 Thousands/µL                    XR chest 1 view portable   Final Result by Vj Hoover MD (02/14 0725)      No acute cardiopulmonary disease.            Workstation performed: [id number]                    Procedures  Procedures         ED Course                                             Medical Decision Making  68-year-old female with shortness of breath, has some evidence of bronchospasm and possible pneumonia on exam here, will check infectious workup, will give nebs, patient developed oxygen requirement, will admit for acute hypoxic respiratory failure.    Amount and/or Complexity of Data Reviewed  Labs: ordered.  Radiology: ordered.    Risk  Prescription drug management.  Decision regarding hospitalization.             Disposition  Final diagnoses:   Dyspnea   Hypoxia     Time reflects when diagnosis was documented in both MDM as applicable and the Disposition within this note       Time User Action Codes Description Comment    2/13/2024  8:19 PM Kristian Curiel [R06.00] Dyspnea     2/13/2024  8:19 PM Kristian Curiel Add [R09.02] Hypoxia     2/15/2024  8:22 AM Raphael Kramer [R78.81] Positive blood culture           ED Disposition       ED Disposition   Admit    Condition   Stable    Date/Time   Tue Feb 13, 2024  8:19 PM    Comment   Case was discussed with MICHELLE and the patient's admission status was agreed to be Admission Status: inpatient status to the service of Dr. Hoang .               Follow-up Information       Follow up With Specialties Details Why Contact Info    Linda Patton DO Family Medicine Schedule an appointment as soon as possible for a visit  [address] [place] [address]  [phone]  [phone]      [facility]  Follow up **PRIMARY RN PLEASE FAX AVS AT GA [phone]**  Start of care will be 2/17/24! Please call the agency number listed if you have any questions [address] [address]  Phone: [phone]  Fax: [phone]            Discharge Medication List as of 2/16/2024  1:05 PM        START taking these medications    Details   dextromethorphan-guaiFENesin (ROBITUSSIN DM)  mg/5 mL syrup Take 10 mL by mouth every 4 (four) hours as needed for cough, Starting Fri 2/16/2024, Normal      predniSONE 20 mg tablet Multiple Dosages:Starting Fri 2/16/2024, Until Sat 2/17/2024, THEN Starting Sun 2/18/2024, Until Mon 2/19/2024, THEN Starting Tue 2/20/2024, Until Wed 2/21/2024, THEN Starting Thu 2/22/2024, Until Fri 2/23/2024Take 2 tablets (40 mg total) by mouth d aily for 2 days, THEN 1.5 tablets (30 mg total) daily for 2 days, THEN 1 tablet (20 mg total) daily for 2 days, THEN 0.5 tablets (10 mg total) daily for 2 days., Normal      albuterol (PROVENTIL HFA,VENTOLIN HFA) 90 mcg/act inhaler Inhale 2 puffs every 4 (four) hours as needed for wheezing, Starting Fri 2/16/2024, Normal           CONTINUE these medications which have NOT CHANGED    Details   atorvastatin (LIPITOR) 40 mg tablet TAKE 1 TABLET BY MOUTH ONCE  DAILY, Starting Wed 12/27/2023, Normal      DULoxetine (CYMBALTA) 60 mg delayed release capsule TAKE 1 CAPSULE BY MOUTH DAILY, Normal      fexofenadine (ALLEGRA) 180 MG tablet Take 180 mg by mouth daily, Historical Med      furosemide (LASIX) 40 mg tablet Take 40 mg by mouth daily, Historical Med      gabapentin (NEURONTIN) 300 mg capsule TAKE 2 CAPSULES BY MOUTH 3 TIMES DAILY, Starting Wed 12/27/2023, Normal      hydroCHLOROthiazide 25 mg tablet Take 1 tablet (25 mg total) by mouth daily, Starting Mon 2/12/2024, Normal      insulin glargine (Toujeo Max SoloStar) 300 units/mL CONCENTRATED U-300 injection pen (2-unit dial)  Inject 45 Units under the skin daily at bedtime, Starting Mon 10/16/2023, No Print      omeprazole (PriLOSEC) 40 MG capsule TAKE 1 CAPSULE BY MOUTH DAILY, Starting Mon 12/11/2023, Normal      Continuous Blood Gluc Sensor (FreeStyle Mark 2 Sensor) MISC Check blood sugars multiple times per day, Normal      glipiZIDE (GLUCOTROL XL) 5 mg 24 hr tablet TAKE 1 TABLET BY MOUTH DAILY, Starting Thu 12/14/2023, Normal      Continuous Blood Gluc  (FreeStyle Mark 2 [place]) SAGE Check blood sugars multiple times per day, Normal             Outpatient Discharge Orders   Spacer Device for Inhaler     Activity as tolerated       PDMP Review         Value Time User    PDMP Reviewed  Yes 1/5/2023 10:06 AM Linda Patton DO            ED Provider  Electronically Signed by             Kristian Curiel MD  02/25/24 3836

## 2024-03-12 ENCOUNTER — HOSPITAL ENCOUNTER (OUTPATIENT)
Dept: PULMONOLOGY | Facility: HOSPITAL | Age: 69
Discharge: HOME | End: 2024-03-12
Payer: MEDICARE

## 2024-03-12 DIAGNOSIS — J40 BRONCHITIS: ICD-10-CM

## 2024-03-12 PROCEDURE — 94729 DIFFUSING CAPACITY: CPT | Performed by: INTERNAL MEDICINE

## 2024-03-12 PROCEDURE — 94726 PLETHYSMOGRAPHY LUNG VOLUMES: CPT

## 2024-03-12 PROCEDURE — 94726 PLETHYSMOGRAPHY LUNG VOLUMES: CPT | Performed by: INTERNAL MEDICINE

## 2024-03-12 PROCEDURE — 94760 N-INVAS EAR/PLS OXIMETRY 1: CPT

## 2024-03-12 PROCEDURE — 94060 EVALUATION OF WHEEZING: CPT

## 2024-03-12 PROCEDURE — 94729 DIFFUSING CAPACITY: CPT

## 2024-03-12 PROCEDURE — 94060 EVALUATION OF WHEEZING: CPT | Performed by: INTERNAL MEDICINE

## 2024-03-12 RX ORDER — ALBUTEROL SULFATE 2.5 MG/3ML
2.5 SOLUTION RESPIRATORY (INHALATION) ONCE
Status: COMPLETED | OUTPATIENT
Start: 2024-03-12 | End: 2024-03-12

## 2024-03-12 RX ADMIN — ALBUTEROL SULFATE 2.5 MG: 2.5 SOLUTION RESPIRATORY (INHALATION) at 09:11

## 2024-03-18 ENCOUNTER — TELEPHONE (OUTPATIENT)
Age: 69
End: 2024-03-18

## 2024-03-18 NOTE — TELEPHONE ENCOUNTER
----- Message from Linda Patton DO sent at 3/18/2024  9:46 AM EDT -----  Lung function test shows mild restrictive pattern meaning her lung capacity is decreased. . It does improve with albuterol use. I recommend using albuterol as needed for shortness of breath.

## 2024-03-20 ENCOUNTER — TELEPHONE (OUTPATIENT)
Age: 69
End: 2024-03-20

## 2024-03-20 DIAGNOSIS — G56.02 LEFT CARPAL TUNNEL SYNDROME: Primary | ICD-10-CM

## 2024-03-20 RX ORDER — IBUPROFEN 600 MG/1
600 TABLET ORAL EVERY 6 HOURS PRN
Qty: 30 TABLET | Refills: 0 | Status: SHIPPED | OUTPATIENT
Start: 2024-03-20

## 2024-03-20 NOTE — TELEPHONE ENCOUNTER
Message for Dr Patton:    Pt has pain in left wrist; comes and goes- burning sensation    High of 8/10  Pain is when using left arm; stops after using left arm    When Hattie touches her wrist; it hurts- feels like it's sore    Has diabetic Neuropathy  Pain started after she fell in the Fall.

## 2024-03-20 NOTE — TELEPHONE ENCOUNTER
Sounds like possible nerve impingement like carpal tunnel. I will send some anitinflammitories. She needs to go to hand ot/pt for further treatment

## 2024-03-21 ENCOUNTER — TELEPHONE (OUTPATIENT)
Age: 69
End: 2024-03-21

## 2024-03-21 NOTE — TELEPHONE ENCOUNTER
Patients GI provider:  Dr. Holly    Number to return call: [phone]    Reason for call: Pt called stating she has not taken  trulicity medication for some time and is asking if she still needs to proceed with the EGD.     Scheduled procedure/appointment date if applicable: 4/4/2024

## 2024-03-25 NOTE — TELEPHONE ENCOUNTER
I've got approval from [facility] to schedule colonoscopy same day. Called patient, but patient requested to cancel procedure because as per patient Trulicity was making her sick, and that's where symptoms came from.      Thanks,  Sharon ZHU

## 2024-03-27 ENCOUNTER — TELEPHONE (OUTPATIENT)
Age: 69
End: 2024-03-27

## 2024-04-04 DIAGNOSIS — F32.A ANXIETY AND DEPRESSION: ICD-10-CM

## 2024-04-04 DIAGNOSIS — F41.9 ANXIETY AND DEPRESSION: ICD-10-CM

## 2024-04-04 RX ORDER — BUPROPION HYDROCHLORIDE 150 MG/1
150 TABLET ORAL EVERY MORNING
Qty: 30 TABLET | Refills: 11 | Status: SHIPPED | OUTPATIENT
Start: 2024-04-04

## 2024-04-17 ENCOUNTER — RA CDI HCC (OUTPATIENT)
Dept: OTHER | Facility: HOSPITAL | Age: 69
End: 2024-04-17

## 2024-04-24 ENCOUNTER — CONSULT (OUTPATIENT)
Age: 69
End: 2024-04-24
Payer: MEDICARE

## 2024-04-24 ENCOUNTER — APPOINTMENT (OUTPATIENT)
Age: 69
End: 2024-04-24
Payer: MEDICARE

## 2024-04-24 VITALS
RESPIRATION RATE: 16 BRPM | HEIGHT: 62 IN | HEART RATE: 77 BPM | SYSTOLIC BLOOD PRESSURE: 128 MMHG | DIASTOLIC BLOOD PRESSURE: 76 MMHG | BODY MASS INDEX: 47.41 KG/M2 | TEMPERATURE: 97 F | WEIGHT: 257.6 LBS | OXYGEN SATURATION: 98 %

## 2024-04-24 DIAGNOSIS — E11.42 TYPE 2 DIABETES MELLITUS WITH DIABETIC POLYNEUROPATHY, WITH LONG-TERM CURRENT USE OF INSULIN (HCC): ICD-10-CM

## 2024-04-24 DIAGNOSIS — M25.532 LEFT WRIST PAIN: ICD-10-CM

## 2024-04-24 DIAGNOSIS — Z01.818 PRE-OP EXAMINATION: ICD-10-CM

## 2024-04-24 DIAGNOSIS — H26.9 CATARACT OF BOTH EYES, UNSPECIFIED CATARACT TYPE: Primary | ICD-10-CM

## 2024-04-24 DIAGNOSIS — R63.4 ABNORMAL WEIGHT LOSS: ICD-10-CM

## 2024-04-24 DIAGNOSIS — Z79.4 TYPE 2 DIABETES MELLITUS WITH DIABETIC POLYNEUROPATHY, WITH LONG-TERM CURRENT USE OF INSULIN (HCC): ICD-10-CM

## 2024-04-24 DIAGNOSIS — R10.13 EPIGASTRIC PAIN: ICD-10-CM

## 2024-04-24 DIAGNOSIS — R11.0 NAUSEA: ICD-10-CM

## 2024-04-24 LAB
CREAT UR-MCNC: 154.6 MG/DL
EST. AVERAGE GLUCOSE BLD GHB EST-MCNC: 169 MG/DL
HBA1C MFR BLD: 7.5 %
MICROALBUMIN UR-MCNC: 10.6 MG/L
MICROALBUMIN/CREAT 24H UR: 7 MG/G CREATININE (ref 0–30)
TSH SERPL DL<=0.05 MIU/L-ACNC: 2.35 UIU/ML (ref 0.45–4.5)

## 2024-04-24 PROCEDURE — 80048 BASIC METABOLIC PNL TOTAL CA: CPT

## 2024-04-24 PROCEDURE — 83036 HEMOGLOBIN GLYCOSYLATED A1C: CPT

## 2024-04-24 PROCEDURE — 73110 X-RAY EXAM OF WRIST: CPT

## 2024-04-24 PROCEDURE — 36415 COLL VENOUS BLD VENIPUNCTURE: CPT

## 2024-04-24 PROCEDURE — 82043 UR ALBUMIN QUANTITATIVE: CPT

## 2024-04-24 PROCEDURE — 82570 ASSAY OF URINE CREATININE: CPT

## 2024-04-24 PROCEDURE — 99215 OFFICE O/P EST HI 40 MIN: CPT

## 2024-04-24 PROCEDURE — 80076 HEPATIC FUNCTION PANEL: CPT

## 2024-04-24 PROCEDURE — 84443 ASSAY THYROID STIM HORMONE: CPT

## 2024-04-24 RX ORDER — PREDNISOLONE ACETATE 10 MG/ML
SUSPENSION/ DROPS OPHTHALMIC
COMMUNITY
Start: 2024-04-16

## 2024-04-24 RX ORDER — MOXIFLOXACIN 5 MG/ML
SOLUTION/ DROPS OPHTHALMIC
COMMUNITY
Start: 2024-04-16

## 2024-04-24 NOTE — PROGRESS NOTES
Presurgical Evaluation    Subjective:      Patient ID: Hattie Patton is a 68 y.o. female.    Chief Complaint   Patient presents with   • Pre-op Exam     Left cataract   • Hand Pain     Fall in October 2023.       Katia is a 68-year-old female presenting to the office for preop clearance for cataract surgery and with a complaint of left wrist pain for approximately the past 5-6 months.  Last October she fell outside of her home and landed on her left wrist on the sidewalk.  Since then she has had some pain in her left wrist, but has not had it evaluated.  She says the pain is worse when she is holding objects like her coffee mug in the morning.  The pain is located on the ulnar side of her wrist and forearm.  She denies any numbness or tingling of her hand or fingers.  Occasionally with holding objects she will get some pain that shoots up her forearm to approximately her elbow.  She is taking several medications for chronic pain and also uses medical marijuana.  She denies any other acute complaints today.  Patient declined mammogram screening.         The following portions of the patient's history were reviewed and updated as appropriate: allergies, current medications, past family history, past medical history, past social history, past surgical history, and problem list.    Procedure date: left cataract 5/2, right cataract 5/16    Surgeon:  Dr. Wilson  Planned procedure:  Cataract surgery  Diagnosis for procedure:  Cataracts    Prior anesthesia: Yes   General; Complications:  None / Tolerated well    CAD History: None   NOTE: Patient should see Cardiology if time available before surgery, and if appropriate.    Pulmonary History: None    Renal history: None    Diabetes History:  Type 2  Uncontrolled     Neurological History: None     On Immunosuppressant meds/biologics: No      Review of Systems   Constitutional:  Negative for chills and fever.   HENT:  Negative for congestion and sore throat.    Eyes:   Negative for pain and visual disturbance.   Respiratory:  Negative for cough and shortness of breath.    Cardiovascular:  Negative for chest pain and palpitations.   Gastrointestinal:  Negative for abdominal pain, constipation and diarrhea.   Genitourinary:  Negative for dysuria and hematuria.   Musculoskeletal:  Positive for arthralgias and myalgias (Left wrist and forearm pain).   Skin:  Negative for color change and rash.   Neurological:  Negative for dizziness and headaches.         Current Outpatient Medications   Medication Sig Dispense Refill   • albuterol (PROVENTIL HFA,VENTOLIN HFA) 90 mcg/act inhaler Inhale 2 puffs every 4 (four) hours as needed for wheezing 6.7 g 0   • atorvastatin (LIPITOR) 40 mg tablet TAKE 1 TABLET BY MOUTH ONCE  DAILY 90 tablet 2   • buPROPion (WELLBUTRIN XL) 150 mg 24 hr tablet TAKE 1 TABLET BY MOUTH IN THE  MORNING 30 tablet 11   • DULoxetine (CYMBALTA) 60 mg delayed release capsule TAKE 1 CAPSULE BY MOUTH DAILY 90 capsule 2   • fexofenadine (ALLEGRA) 180 MG tablet Take 180 mg by mouth daily     • furosemide (LASIX) 40 mg tablet Take 40 mg by mouth daily     • gabapentin (NEURONTIN) 300 mg capsule TAKE 2 CAPSULES BY MOUTH 3 TIMES DAILY 540 capsule 2   • glipiZIDE (GLUCOTROL XL) 10 mg 24 hr tablet Take 1 tablet (10 mg total) by mouth daily 90 tablet 1   • hydroCHLOROthiazide 25 mg tablet Take 1 tablet (25 mg total) by mouth daily 90 tablet 1   • ibuprofen (MOTRIN) 600 mg tablet Take 1 tablet (600 mg total) by mouth every 6 (six) hours as needed for mild pain 30 tablet 0   • insulin glargine (Toujeo Max SoloStar) 300 units/mL CONCENTRATED U-300 injection pen (2-unit dial) Inject 45 Units under the skin daily at bedtime (Patient taking differently: Inject 44 Units under the skin daily at bedtime) 6 mL 11   • omeprazole (PriLOSEC) 40 MG capsule TAKE 1 CAPSULE BY MOUTH DAILY 30 capsule 5   • moxifloxacin (VIGAMOX) 0.5 % ophthalmic solution instill 1 drop into left eye four times a day as  directed for BEFORE AND AFTER SURGERY (Patient not taking: Reported on 2024)     • prednisoLONE acetate (PRED FORTE) 1 % ophthalmic suspension instill 1 drop into left eye four times a day as directed USE AFTER SURGERY (Patient not taking: Reported on 2024)       No current facility-administered medications for this visit.       Allergies on file:   Penicillins and Pollen extract    Patient Active Problem List   Diagnosis   • Obesity, morbid (HCC)   • Mixed hyperlipidemia   • Primary hypertension   • Type 2 diabetes mellitus with diabetic polyneuropathy, with long-term current use of insulin ([facility])   • Diverticulosis   • Pancreatic cyst   • Lumbar facet arthropathy   • SIRS (systemic inflammatory response syndrome) ([facility])   • Positive blood culture   • Dyspnea   • Bronchitis        Past Medical History:   Diagnosis Date   • Cancer (HCC)    • Diabetes mellitus ([facility])    • Diverticulitis of colon    • Hypertension        Past Surgical History:   Procedure Laterality Date   • CHOLECYSTECTOMY     • COLONOSCOPY     • GALLBLADDER SURGERY     • HYSTERECTOMY     • UMBILICAL HERNIA REPAIR         Family History   Problem Relation Age of Onset   • Heart disease Mother    • Cancer Father    • Hypertension Father    • Cancer Sister    • Mental illness Sister    • Cancer Paternal Aunt    • Diabetes Brother        Social History     Tobacco Use   • Smoking status: Former     Current packs/day: 0.00     Types: Cigarettes     Quit date: 2000     Years since quittin.4   • Smokeless tobacco: Former   Vaping Use   • Vaping status: Never Used   Substance Use Topics   • Alcohol use: Never     Comment: socially - rare   • Drug use: Yes     Frequency: 7.0 times per week     Types: Marijuana     Comment: every day medical marijuana, oil or smoke       Objective:    Vitals:    24 1439   BP: 128/76   BP Location: Right arm   Patient Position: Sitting   Cuff Size: Large   Pulse: 77   Resp: 16   Temp: (!) 97 °F (36.1 °C)  "  TempSrc: Tympanic   SpO2: 98%   Weight: 117 kg (257 lb 9.6 oz)   Height: 5' 2\" (1.575 m)        Physical Exam  Constitutional:       Appearance: Normal appearance.   HENT:      Head: Normocephalic and atraumatic.      Nose: Nose normal.      Mouth/Throat:      Mouth: Mucous membranes are moist.      Pharynx: Oropharynx is clear. No oropharyngeal exudate.   Eyes:      Extraocular Movements: Extraocular movements intact.      Conjunctiva/sclera: Conjunctivae normal.   Cardiovascular:      Rate and Rhythm: Normal rate and regular rhythm.      Pulses: Normal pulses.      Heart sounds: Normal heart sounds. No murmur heard.  Pulmonary:      Effort: Pulmonary effort is normal.      Breath sounds: Normal breath sounds. No wheezing, rhonchi or rales.   Abdominal:      Palpations: Abdomen is soft.   Musculoskeletal:         General: Normal range of motion.      Right elbow: Normal.      Left elbow: Normal. No swelling. Normal range of motion. No tenderness.      Right wrist: Normal. No swelling or tenderness. Normal range of motion.      Left wrist: Tenderness present. No swelling, deformity or snuff box tenderness. Normal range of motion.        Arms:       Cervical back: Normal range of motion.      Comments: Pain to palpation of the medial (ulnar) wrist and forearm.  Sensation intact.  Full range of motion of the wrist.  Decreased strength and pain with extension of the left wrist against resistance secondary to pain.   No pain with flexion of the wrist against resistance.   No pain to palpation of the left elbow.    Skin:     General: Skin is warm and dry.      Findings: No bruising, erythema, lesion or rash.   Neurological:      General: No focal deficit present.      Mental Status: She is alert. Mental status is at baseline.      Sensory: No sensory deficit.      Motor: No weakness.   Psychiatric:         Mood and Affect: Mood normal.         Behavior: Behavior normal.           Preop labs/testing available and " reviewed: yes               EKG yes    Echo no    Stress test/cath no    PFT/Angel no    Functional capacity: Climb stairs                        4 Mets   Pick the highest level patient can comfortably perform   4 mets or greater for surgery    RCRI  High Risk surgery?         1 Point  CAD History:         1 Point   MI; Positive Stress Test; CP due to Mi;  Nitrate Usage to control Angina; Pathologic Q wave on EKG  CHF Active:         1 Point   Pulm Edema; Paroxysmal Nocturnal Dyspnea;  Bibasilar Rales (crackles);S3; CHF on CXR  Cerebrovascular Disease (TIA or CVA):     1 Point  DM on Insulin:        1 Point  Serum Creat >2.0 mg/dl:       1 Point          Total Points: 0     Scorin: Class I, Very Low Risk (0.4%)     1: Class II, Low risk (0.9%)     2: Class III Moderate (6.6%)     3: Class IV High (>11%)      FLORIDALMA Risk:  GFR:        For PCP:  If GFR>60, Hold ACE/ARB/Diuretic on the day of surgery, and NSAIDS 10 days before.    If GFR<45, Consider PRE and POST op Nephrology Consult.    If 46 <GFR> 59 : Has Patient had FLORIDALMA in last 6 Months? no   If YES: Preop Nephrology consult   If No:  Post Op Nephrology consult.           Assessment/Plan:    Patient is medically optimized (cleared) for the planned procedure.    Further testing/evaluation is not required.    Postop concerns: no    Problem List Items Addressed This Visit     Type 2 diabetes mellitus with diabetic polyneuropathy, with long-term current use of insulin (HCC)    Relevant Orders    Albumin / creatinine urine ratio   Other Visit Diagnoses     Cataract of both eyes, unspecified cataract type    -  Primary    Relevant Medications    moxifloxacin (VIGAMOX) 0.5 % ophthalmic solution    prednisoLONE acetate (PRED FORTE) 1 % ophthalmic suspension    Left wrist pain        Relevant Orders    XR wrist 3+ vw left    Ambulatory Referral to Physical Therapy    Pre-op examination               Diagnoses and all orders for this visit:    Cataract of both eyes,  "unspecified cataract type  Patient instructed to stop taking hydrochlorothiazide, furosemide, and glipizide the day of surgery.  Patient instructed to follow specific preoperative instructions as indicated by her surgical team.     Type 2 diabetes mellitus with diabetic polyneuropathy, with long-term current use of insulin (HCC)  -     Albumin / creatinine urine ratio; Future  Continued current treatment regimen.  Hold glipizide the day of surgery to prevent hypoglycemia.  Take insulin glargine the night before surgery.    Left wrist pain  -     XR wrist 3+ vw left; Future  -     Ambulatory Referral to Physical Therapy; Future  Will check x-ray for any factures.  If no fractures, recommend follow-up with physical therapy.  If there is a fracture, we will likely have the patient follow-up with orthopedics.  Continue current pain medications.  May take ibuprofen and/or Tylenol as needed for pain.    Pre-op examination    Other orders  -     moxifloxacin (VIGAMOX) 0.5 % ophthalmic solution; instill 1 drop into left eye four times a day as directed for BEFORE AND AFTER SURGERY (Patient not taking: Reported on 4/24/2024)  -     prednisoLONE acetate (PRED FORTE) 1 % ophthalmic suspension; instill 1 drop into left eye four times a day as directed USE AFTER SURGERY (Patient not taking: Reported on 4/24/2024)          No outpatient medications have been marked as taking for the 4/24/24 encounter (Appointment) with Yolanda Mario PA-C.        NOTE: Please use the above to review important meds for your specialty, the remainder \"per anesthesia Guidelines.\"    NOTE: Please place an Inbasket message for \"SOC\" pool for complicated patients.      "

## 2024-04-25 ENCOUNTER — TELEPHONE (OUTPATIENT)
Age: 69
End: 2024-04-25

## 2024-04-25 LAB
ALBUMIN SERPL BCP-MCNC: 4.1 G/DL (ref 3.5–5)
ALP SERPL-CCNC: 85 U/L (ref 34–104)
ALT SERPL W P-5'-P-CCNC: 18 U/L (ref 7–52)
ANION GAP SERPL CALCULATED.3IONS-SCNC: 11 MMOL/L (ref 4–13)
AST SERPL W P-5'-P-CCNC: 18 U/L (ref 13–39)
BILIRUB DIRECT SERPL-MCNC: 0.11 MG/DL (ref 0–0.2)
BILIRUB SERPL-MCNC: 0.56 MG/DL (ref 0.2–1)
BUN SERPL-MCNC: 15 MG/DL (ref 5–25)
CALCIUM SERPL-MCNC: 9.3 MG/DL (ref 8.4–10.2)
CHLORIDE SERPL-SCNC: 102 MMOL/L (ref 96–108)
CO2 SERPL-SCNC: 29 MMOL/L (ref 21–32)
CREAT SERPL-MCNC: 0.71 MG/DL (ref 0.6–1.3)
GFR SERPL CREATININE-BSD FRML MDRD: 88 ML/MIN/1.73SQ M
GLUCOSE P FAST SERPL-MCNC: 91 MG/DL (ref 65–99)
POTASSIUM SERPL-SCNC: 3.8 MMOL/L (ref 3.5–5.3)
PROT SERPL-MCNC: 7.2 G/DL (ref 6.4–8.4)
SODIUM SERPL-SCNC: 142 MMOL/L (ref 135–147)

## 2024-04-25 NOTE — TELEPHONE ENCOUNTER
----- Message from Yolanda Mario PA-C sent at 4/25/2024  8:22 AM EDT -----  X-ray imaging does not show any evidence of fracture.  There is mild arthritis in the wrist near the base of the thumb.  Otherwise no significant abnormalities.  I would recommend the patient follow-up with physical therapy as discussed.

## 2024-04-25 NOTE — TELEPHONE ENCOUNTER
----- Message from Yolanda Mario PA-C sent at 4/25/2024  8:23 AM EDT -----  Albumin / creatinine ratio is normal.

## 2024-04-25 NOTE — TELEPHONE ENCOUNTER
----- Message from Linda Patton DO sent at 4/25/2024  8:25 AM EDT -----  Overall blood work is good. A1c has decreased from 8.2 to 7.5. this is really good.

## 2024-04-25 NOTE — TELEPHONE ENCOUNTER
Pt saw Yolanda yesterday - pre-op - forgot paperwork from [facility]    Paperwork in [place] - today    Please call when ready to

## 2024-04-30 ENCOUNTER — NURSE TRIAGE (OUTPATIENT)
Age: 69
End: 2024-04-30

## 2024-04-30 DIAGNOSIS — F41.9 ANXIETY AND DEPRESSION: Primary | ICD-10-CM

## 2024-04-30 DIAGNOSIS — F32.A ANXIETY AND DEPRESSION: ICD-10-CM

## 2024-04-30 DIAGNOSIS — F32.A ANXIETY AND DEPRESSION: Primary | ICD-10-CM

## 2024-04-30 DIAGNOSIS — F41.9 ANXIETY AND DEPRESSION: ICD-10-CM

## 2024-04-30 RX ORDER — BUSPIRONE HYDROCHLORIDE 5 MG/1
5 TABLET ORAL 2 TIMES DAILY
Qty: 60 TABLET | Refills: 5 | Status: SHIPPED | OUTPATIENT
Start: 2024-04-30 | End: 2024-04-30 | Stop reason: SDUPTHER

## 2024-04-30 NOTE — TELEPHONE ENCOUNTER
Pt needs advise on her medication,Wellbutrin. She is experiencing hallucinations at this time. Only small episodes. Momentarily she describes it as someone handing her something and has put her hand out. She wanted to make the doctor aware. Pt took her dose this morning. Nurse advise patient to stop taking her medication at this time,till doctor can further advise.   Pt would like a call back from the PCP as soon as possible@[phone].Pt denies wanting to her harm herself or anyone else at this time.

## 2024-04-30 NOTE — TELEPHONE ENCOUNTER
"Medication Refill Request     Name busPIRone (BUSPAR) 5 mg tablet    Dose/Frequency  5 mg/2 times daily  Quantity 60 tablet  Verified pharmacy   [x]  Verified ordering Provider   [x]  Does patient have enough for the next 3 days? Yes [] No [x]      Pt stated, \"Please have the office call me when they open and send this to confirm they sent it properly to the [facility] pharmacy. I only want to use this pharmacy once to refill this medication.\"    Please call pt when office reopens   "

## 2024-05-01 RX ORDER — BUSPIRONE HYDROCHLORIDE 5 MG/1
5 TABLET ORAL 2 TIMES DAILY
Qty: 60 TABLET | Refills: 0 | Status: SHIPPED | OUTPATIENT
Start: 2024-05-01

## 2024-05-07 DIAGNOSIS — K22.70 BARRETT'S ESOPHAGUS WITHOUT DYSPLASIA: ICD-10-CM

## 2024-05-07 RX ORDER — OMEPRAZOLE 40 MG/1
40 CAPSULE, DELAYED RELEASE ORAL DAILY
Qty: 90 CAPSULE | Refills: 1 | Status: SHIPPED | OUTPATIENT
Start: 2024-05-07

## 2024-05-09 ENCOUNTER — TELEPHONE (OUTPATIENT)
Dept: PSYCHIATRY | Facility: CLINIC | Age: 69
End: 2024-05-09

## 2024-05-09 NOTE — TELEPHONE ENCOUNTER
Patient called in returning IC call.    Writer reached out to IC and transferred call for further assistance.

## 2024-05-09 NOTE — TELEPHONE ENCOUNTER
"Behavioral Health Outpatient Intake Questions    Referred By   : Referral PCP     Please advise interviewee that they need to answer all questions truthfully to allow for best care, and any misrepresentations of information may affect their ability to be seen at this clinic   => Was this discussed? Yes     If Minor Child (under age 18)    Who is/are the legal guardian(s) of the child?     Is there a custody agreement? No     If \"YES\"- Custody orders must be obtained prior to scheduling the first appointment  In addition, Consent to Treatment must be signed by all legal guardians prior to scheduling the first appointment    If \"NO\"- Consent to Treatment must be signed by all legal guardians prior to scheduling the first appointment    Behavioral Health Outpatient Intake History -     Presenting Problem (in patient's own words):     Pt has tried different psych meds and they don't seem to work. Pt would like to try therapy to help with anxiety     Are there any communication barriers for this patient?     No                                               If yes, please describe barriers:  NONE   If there is a unique situation, please refer to [facility] Wisdom/Fatoumata Lara for final determination.    Are you taking any psychiatric medications? Yes     If \"YES\" -What are they Cymbalta     If \"YES\" -Who prescribes? PCP     Has the Patient previously received outpatient Talk Therapy or Medication Management from [facility]  No        If \"YES\"- When, Where and with Whom?         If \"NO\" -Has Patient received these services elsewhere?       If \"YES\" -When, Where, and with Whom?    Has the Patient abused alcohol or other substances in the last 6 months ? No  No concerns of substance abuse are reported.     If \"YES\" -What substance, How much, How often?     If illegal substance: Refer to [facility] (for NOE) or [facility].   If Alcohol in excess of 10 drinks per week:  Refer to [facility] (for NOE) or [facility] " "Offices    Legal History-     Is this treatment court ordered? No   If \"yes \"send to :  Talk Therapy : Send to Ed[facility] Wisdom/Fatoumata Lara for final determination   Med Management: Send to Dr Richardson for final determination     Has the Patient been convicted of a felony?  No   If \"Yes\" send to -When, What?  Talk Therapy : Send to Edgard Lara for final determination   Med Management: Send to Dr Richardson for final determination     ACCEPTED as a patient Yes  If \"Yes\" Appointment Date: 5/20/2024    Referred Elsewhere? No  If “Yes” - (Where? Ex: [facility], [facility], [facility], [facility], etc.)       Name of Insurance Co:Medicare   Insurance ID#[id number]  Insurance Phone #  If ins is primary or secondary?Primary  If patient is a minor, parents information such as Name, D.O.B of guarantor.  "

## 2024-05-20 ENCOUNTER — TELEMEDICINE (OUTPATIENT)
Dept: PSYCHIATRY | Facility: CLINIC | Age: 69
End: 2024-05-20
Payer: MEDICARE

## 2024-05-20 DIAGNOSIS — F43.23 ADJUSTMENT DISORDER WITH MIXED ANXIETY AND DEPRESSED MOOD: Primary | ICD-10-CM

## 2024-05-20 PROCEDURE — 90791 PSYCH DIAGNOSTIC EVALUATION: CPT

## 2024-05-20 NOTE — PSYCH
Virtual Regular Visit    Verification of patient location:    Patient is located at Home in the following state in which I hold an active license [place]      Assessment/Plan:    Problem List Items Addressed This Visit    None  Visit Diagnoses       Adjustment disorder with mixed anxiety and depressed mood    -  Primary               Reason for visit is   Chief Complaint   Patient presents with    Virtual Regular Visit          Encounter provider Melissa Dunbar      Recent Visits  No visits were found meeting these conditions.  Showing recent visits within past 7 days and meeting all other requirements  Today's Visits  Date Type Provider Dept   24 Telemedicine Melissa Dunbar [facility]   Showing today's visits and meeting all other requirements  Future Appointments  No visits were found meeting these conditions.  Showing future appointments within next 150 days and meeting all other requirements       The patient was identified by name and date of birth. Hattie Patton was informed that this is a telemedicine visit and that the visit is being conducted throughthe A2B platform. She agrees to proceed..  My office door was closed. No one else was in the room.  She acknowledged consent and understanding of privacy and security of the video platform. The patient has agreed to participate and understands they can discontinue the visit at any time.    Patient is aware this is a billable service.     Subjective  Hattie Patton is a 68 y.o. female  presenting to this initial virtual session for anxiety (self reported).  Provider wanted to increased meds to assist with anxiety.  She is currently taking cymbalta that was recently augmented with Welbutrin.  She began have v/h.  Welbutrin was d/c and Buspar was initiated.  She does not like Buspar as she does not like   Dog  a year ago, she has been miserable  since then. New dog is Luther the [employer] Retriever. This gave  "her \"better feeling.\"  She has chronic pain, fell last winter and hurt wrist.  She takes Gabapentine for nerve pain.  She has medical marijuana license, sharing belief that oils were most helpful but she cannot afford same.  She is hesitant to try edibles due to memory loss.  She currently smokes marijuana.  This helps her relax.   Mo was dx with cancer in lungs, liver, spine, stomach after she presented to dr for back pain she thought was related to pain from pulling weeds.  Mo lives in [place] and pt visits daily.  Mo has been given weeks to live.  She struggles to watch MO deteriorate.  MO wants facility based hospice and pt is awaiting meeting to learn about same. Sis cannot take care of anyone else as she is an alcoholic.  MO is currently staying with niece.  Coordination of schedules is challenging.   Behavioral Health Psychotherapy Assessment    Date of Initial Psychotherapy Assessment: 05/20/24  Referral Source: PCP  Has a release of information been signed for the referral source? No    Preferred Name: Hattie Abdi  Preferred Pronouns: She/her  YOB: 1955 Age: 68 y.o.  Sex assigned at birth: female   Gender Identity: Female   Race:   Preferred Language: English    Emergency Contact:  Full Name: Kevin Urbaner   Relationship to Client: spouse   Contact information: [id number]    Primary Care Physician:  Linda Patton DO  [address] [place] [address]  [phone]  Has a release of information been signed? No    Physical Health History:  Past surgical procedures Broke right leg and ankle 40 years ago, hysterectomy from endoometrial cancer, hernia, cataract   Do you have a history of any of the following: diabetes  Do you have any mobility issues? Yes, describe: uses cane due to neuropathy, legs can give out, become numb, pain in knees.  She has mobile chair to use on her property.  Previously had mobile folding chair that no longer works.       Relevant Family " "History:  Denies     Presenting Problem (What brings you in?)  As above  Bio mo  when pt was age 4 from heart issue.  Her bro  a year later.  Pt went to live with Aunt in [place], she was \"scrappy\" and \"never lets anyone get away with anything.\" She becomes mean to others and denies remorse.  She shared that the sound of voice changing will trigger her.      Mental Health Advance Directive:  Do you currently have a Mental Health Advance Directive?no    Diagnosis:   Diagnosis ICD-10-CM Associated Orders   1. Adjustment disorder with mixed anxiety and depressed mood  F43.23           Initial Assessment:     Current Mental Status:    Appearance: appropriate and casual      Behavior/Manner: cooperative and tearful      Affect/Mood:  Anxious, irritable and angry    Speech:  Pressured and talkative    Sleep:  Normal    Oriented to: oriented to self, oriented to place and oriented to time       Clinical Symptoms    Anxiety: yes      Depression Symptoms: irritable      Anxiety Symptoms: irritable, fear of losing control, nervous/anxious and difficulty controlling worry      Have you ever been assaultive to others or the environment: No      Have you ever been self-injurious: No      Counseling History:  Previous Counseling or Treatment  (Mental Health or Drug & Alcohol): No    Have you previously taken psychiatric medications: Yes    Previous Medications Attempted:  Cannot reemember, thinks formerly took mood booster and neuropathy    Suicide Risk Assessment  Have you ever had a suicide attempt: No    Have you had incidents of suicidal ideation: Yes    Additional Suicide Risk Information:  Passive thoughts re not wanting to live anymore, mostly due to chronic pain that she considers to be debilitating.  She is not able to go on day trips with fam due to walking.  She denies planning.  States she talks about wanting to die so that others understand her pain. Temp changes affect pain. She does not like heat, causes " "conflicts with spouse.      Substance Abuse/Addiction Assessment:  Alcohol: Yes    Age of First Use:  17  Frequency:  Yearly  Amount:  Wine cooler  Last use:  Couple of months  Heroin: No    Fentanyl: No    Opiates: No    Cocaine: No    Amphetamines: No    Hallucinogens: No    Club Drugs: No    Benzodiazepines: No    Other Rx Meds: No    Marijuana: Yes    Age of First Use:  18; currently uses medical  Age of regular use:  66 years old  Frequency:  Daily  Amount:  10-15 hits per day  Method:  Smoke/pipe  Last Use:  10 am, willing to rerain from use piror to future sessions.  Tobacco/Nicotine: No    Have you experienced blackouts as a result of substance use: No    Have you had any periods of abstinence: No    Have you experienced symptoms of withdrawal: No    Have you ever overdosed on any substances?: No    Are you currently using any Medication Assisted Treatment for Substance Use: No      Compulsive Behaviors:  Compulsive Behavior Information:  Denies; shopping had been primary coping skill, fam \"got into trouble\" financially.  She has been paying off debt.       Disordered Eating History:  Do you have a history of disordered eating: No      Social Determinants of Health:    SDOH:  Financial instability and stress    Trauma and Abuse History:    Have you ever been abused: No      Legal History:    Have you ever been arrested  or had a DUI: No      Have you been incarcerated: No      Are you currently on parole/probation: No      Any current Children and Youth involvement: No      Any pending legal charges: No      Relationship History:    Current marital status:       Relationship History:   for 45 years     Employment History    Are you currently employed: No      Longest period of employment:   for 17 years    Sources of income/financial support:  snf Pension and Supplemental Security Income (SSI)     History:      Status: no history of  " duty  Educational History:     Have you ever been diagnosed with a learning disability: No      Highest level of education:  High school graduate    Have you ever had an IEP or 504-plan: No      Do you need assistance with reading or writing: No      Recommended Treatment:     Psychotherapy:  Individual sessions    Frequency:  1 time    Session frequency:  Weekly      Visit start and stop times:    24  Start Time: 1115  Stop Time: 1210  Total Visit Time: 55 minutes  She recently had eye surgery (cataracts).    FA  18 years ago from bladder cancer and dementia.        Past Medical History:   Diagnosis Date    Cancer (HCC)     Diabetes mellitus (HCC)     Diverticulitis of colon     Hypertension        Past Surgical History:   Procedure Laterality Date    CHOLECYSTECTOMY      COLONOSCOPY      GALLBLADDER SURGERY      HYSTERECTOMY      UMBILICAL HERNIA REPAIR         Current Outpatient Medications   Medication Sig Dispense Refill    albuterol (PROVENTIL HFA,VENTOLIN HFA) 90 mcg/act inhaler Inhale 2 puffs every 4 (four) hours as needed for wheezing 6.7 g 0    atorvastatin (LIPITOR) 40 mg tablet TAKE 1 TABLET BY MOUTH ONCE  DAILY 90 tablet 2    busPIRone (BUSPAR) 5 mg tablet Take 1 tablet (5 mg total) by mouth 2 (two) times a day 60 tablet 0    DULoxetine (CYMBALTA) 60 mg delayed release capsule TAKE 1 CAPSULE BY MOUTH DAILY 90 capsule 2    fexofenadine (ALLEGRA) 180 MG tablet Take 180 mg by mouth daily      furosemide (LASIX) 40 mg tablet Take 40 mg by mouth daily      gabapentin (NEURONTIN) 300 mg capsule TAKE 2 CAPSULES BY MOUTH 3 TIMES DAILY 540 capsule 2    glipiZIDE (GLUCOTROL XL) 10 mg 24 hr tablet Take 1 tablet (10 mg total) by mouth daily 90 tablet 1    hydroCHLOROthiazide 25 mg tablet Take 1 tablet (25 mg total) by mouth daily 90 tablet 1    ibuprofen (MOTRIN) 600 mg tablet Take 1 tablet (600 mg total) by mouth every 6 (six) hours as needed for mild pain 30 tablet 0    insulin glargine (Toujeo Max  SoloStar) 300 units/mL CONCENTRATED U-300 injection pen (2-unit dial) Inject 45 Units under the skin daily at bedtime (Patient taking differently: Inject 44 Units under the skin daily at bedtime) 6 mL 11    moxifloxacin (VIGAMOX) 0.5 % ophthalmic solution instill 1 drop into left eye four times a day as directed for BEFORE AND AFTER SURGERY (Patient not taking: Reported on 4/24/2024)      omeprazole (PriLOSEC) 40 MG capsule TAKE 1 CAPSULE BY MOUTH DAILY 90 capsule 1    prednisoLONE acetate (PRED FORTE) 1 % ophthalmic suspension instill 1 drop into left eye four times a day as directed USE AFTER SURGERY (Patient not taking: Reported on 4/24/2024)       No current facility-administered medications for this visit.        Allergies   Allergen Reactions    Penicillins Throat Swelling and Tongue Swelling    Pollen Extract Abdominal Pain     RUNNY NOSE, EYES WATERY, NASAL DRIP ETC

## 2024-05-28 DIAGNOSIS — F32.A ANXIETY AND DEPRESSION: ICD-10-CM

## 2024-05-28 DIAGNOSIS — F41.9 ANXIETY AND DEPRESSION: ICD-10-CM

## 2024-05-29 ENCOUNTER — TELEMEDICINE (OUTPATIENT)
Dept: PSYCHIATRY | Facility: CLINIC | Age: 69
End: 2024-05-29
Payer: MEDICARE

## 2024-05-29 DIAGNOSIS — F43.23 ADJUSTMENT DISORDER WITH MIXED ANXIETY AND DEPRESSED MOOD: Primary | ICD-10-CM

## 2024-05-29 PROCEDURE — 90834 PSYTX W PT 45 MINUTES: CPT

## 2024-05-29 NOTE — PSYCH
Virtual Regular Visit    Verification of patient location:    Patient is located at Home in the following state in which I hold an active license [place]      Assessment/Plan:    Problem List Items Addressed This Visit    None  Visit Diagnoses       Adjustment disorder with mixed anxiety and depressed mood    -  Primary             Reason for visit is   Chief Complaint   Patient presents with    Virtual Regular Visit          Encounter provider Melissa Dunbar      Recent Visits  No visits were found meeting these conditions.  Showing recent visits within past 7 days and meeting all other requirements  Today's Visits  Date Type Provider Dept   05/29/24 Telemedicine Melissa Dunbar [facility]   Showing today's visits and meeting all other requirements  Future Appointments  No visits were found meeting these conditions.  Showing future appointments within next 150 days and meeting all other requirements       The patient was identified by name and date of birth. Hattie Patton was informed that this is a telemedicine visit and that the visit is being conducted throughthe Epic Embedded platform. She agrees to proceed..  My office door was closed. No one else was in the room.  She acknowledged consent and understanding of privacy and security of the video platform. The patient has agreed to participate and understands they can discontinue the visit at any time.    Patient is aware this is a billable service.     Past Medical History:   Diagnosis Date    Cancer (HCC)     Diabetes mellitus (HCC)     Diverticulitis of colon     Hypertension        Past Surgical History:   Procedure Laterality Date    CHOLECYSTECTOMY      COLONOSCOPY      GALLBLADDER SURGERY      HYSTERECTOMY      UMBILICAL HERNIA REPAIR         Current Outpatient Medications   Medication Sig Dispense Refill    albuterol (PROVENTIL HFA,VENTOLIN HFA) 90 mcg/act inhaler Inhale 2 puffs every 4 (four) hours as needed for  wheezing 6.7 g 0    atorvastatin (LIPITOR) 40 mg tablet TAKE 1 TABLET BY MOUTH ONCE  DAILY 90 tablet 2    busPIRone (BUSPAR) 5 mg tablet Take 1 tablet (5 mg total) by mouth 2 (two) times a day 60 tablet 0    DULoxetine (CYMBALTA) 60 mg delayed release capsule TAKE 1 CAPSULE BY MOUTH DAILY 90 capsule 2    fexofenadine (ALLEGRA) 180 MG tablet Take 180 mg by mouth daily      furosemide (LASIX) 40 mg tablet Take 40 mg by mouth daily      gabapentin (NEURONTIN) 300 mg capsule TAKE 2 CAPSULES BY MOUTH 3 TIMES DAILY 540 capsule 2    glipiZIDE (GLUCOTROL XL) 10 mg 24 hr tablet Take 1 tablet (10 mg total) by mouth daily 90 tablet 1    hydroCHLOROthiazide 25 mg tablet Take 1 tablet (25 mg total) by mouth daily 90 tablet 1    ibuprofen (MOTRIN) 600 mg tablet Take 1 tablet (600 mg total) by mouth every 6 (six) hours as needed for mild pain 30 tablet 0    insulin glargine (Toujeo Max SoloStar) 300 units/mL CONCENTRATED U-300 injection pen (2-unit dial) Inject 45 Units under the skin daily at bedtime (Patient taking differently: Inject 44 Units under the skin daily at bedtime) 6 mL 11    moxifloxacin (VIGAMOX) 0.5 % ophthalmic solution instill 1 drop into left eye four times a day as directed for BEFORE AND AFTER SURGERY (Patient not taking: Reported on 4/24/2024)      omeprazole (PriLOSEC) 40 MG capsule TAKE 1 CAPSULE BY MOUTH DAILY 90 capsule 1    prednisoLONE acetate (PRED FORTE) 1 % ophthalmic suspension instill 1 drop into left eye four times a day as directed USE AFTER SURGERY (Patient not taking: Reported on 4/24/2024)       No current facility-administered medications for this visit.        Allergies   Allergen Reactions    Penicillins Throat Swelling and Tongue Swelling    Pollen Extract Abdominal Pain     RUNNY NOSE, EYES WATERY, NASAL DRIP ETC   Behavioral Health Psychotherapy Progress Note    Psychotherapy Provided: Individual Psychotherapy     1. Adjustment disorder with mixed anxiety and depressed mood       "      Goals addressed in session: Goal 1     DATA:   Hattie Patton is a 68 y.o. female presenting to this  sx for the supportive treatment of an adjustment d/o.  She relates that weekend/Holiday was challenging due to visit with Mo.  She relates that Mo's wishes are to NOT die at home, pt is in agreement however sis is contesting same.  POA is niece whose MO is contesting pt's Mo's wishes.   Pt is concerned that this conflict will break up family.  Mo has stopped taking all medications.  Additional conflict occurred with sis when pt started informing fam members of MO's decline.  Sis does not think MO would want others to know about her decline.  Processed similarities b/w sisters who both think they should be able to veto MO's wishes if they think they are right.  Pt believes she has made progress with being able to let things go.  She admits that spouse would disagree.    Pt struggled to id tx goals, indicating she \"just needs to talk.\"  Completed phq 9 and joann 7.      During this session, this clinician used the following therapeutic modalities: Cognitive Behavioral Therapy, Mindfulness-based Strategies, and Supportive Psychotherapy    Substance Abuse was not addressed during this session. If the client is diagnosed with a co-occurring substance use disorder, please indicate any changes in the frequency or amount of use: NA. Stage of change for addressing substance use diagnoses: No substance use/Not applicable    ASSESSMENT:  Hattie Patton presents with a Anxious and Dysthymic mood.     her affect is Normal range and intensity, which is congruent, with her mood and the content of the session. The client has not made progress on their goals.    Hattie Patton presents with a low risk of suicide, low risk of self-harm, and low risk of harm to others.    For any risk assessment that surpasses a \"low\" rating, a safety plan must be developed.    A safety plan was indicated: no  If yes, describe in detail " NA    PLAN: Between sessions, Hattie Patton will review and draft responses to tx and safety plans. At the next session, the therapist will use Cognitive Behavioral Therapy, Mindfulness-based Strategies, and Supportive Psychotherapy to address adjustment d/o..    Behavioral Health Treatment Plan and Discharge Planning: Hattie Patton is aware of and agrees to continue to work on their treatment plan. They have identified and are working toward their discharge goals. yes    Visit start and stop times:    05/29/24  Start Time: 0817  Stop Time: 0906  Total Visit Time: 49 minutes

## 2024-05-30 RX ORDER — BUSPIRONE HYDROCHLORIDE 5 MG/1
5 TABLET ORAL 2 TIMES DAILY
Qty: 60 TABLET | Refills: 5 | Status: SHIPPED | OUTPATIENT
Start: 2024-05-30

## 2024-06-05 ENCOUNTER — TELEMEDICINE (OUTPATIENT)
Dept: PSYCHIATRY | Facility: CLINIC | Age: 69
End: 2024-06-05
Payer: MEDICARE

## 2024-06-05 DIAGNOSIS — F43.23 ADJUSTMENT DISORDER WITH MIXED ANXIETY AND DEPRESSED MOOD: Primary | ICD-10-CM

## 2024-06-05 PROCEDURE — 90832 PSYTX W PT 30 MINUTES: CPT

## 2024-06-05 NOTE — PSYCH
Virtual Regular Visit    Verification of patient location:    Patient is located at Home in the following state in which I hold an active license [place]      Assessment/Plan:    Problem List Items Addressed This Visit    None  Visit Diagnoses       Adjustment disorder with mixed anxiety and depressed mood    -  Primary               Reason for visit is   Chief Complaint   Patient presents with    Virtual Regular Visit          Encounter provider Melissa Dunbar      Recent Visits  Date Type Provider Dept   05/29/24 Telemedicine Melissa Dunbar [facility]   Showing recent visits within past 7 days and meeting all other requirements  Today's Visits  Date Type Provider Dept   06/05/24 Telemedicine Melissa Dunbar [facility]   Showing today's visits and meeting all other requirements  Future Appointments  No visits were found meeting these conditions.  Showing future appointments within next 150 days and meeting all other requirements       The patient was identified by name and date of birth. Hattie Patton was informed that this is a telemedicine visit and that the visit is being conducted throughthe [employer] platform. She agrees to proceed..  My office door was closed. No one else was in the room.  She acknowledged consent and understanding of privacy and security of the video platform. The patient has agreed to participate and understands they can discontinue the visit at any time.    Patient is aware this is a billable service.     Past Medical History:   Diagnosis Date    Cancer (HCC)     Diabetes mellitus (HCC)     Diverticulitis of colon     Hypertension        Past Surgical History:   Procedure Laterality Date    CHOLECYSTECTOMY      COLONOSCOPY      GALLBLADDER SURGERY      HYSTERECTOMY      UMBILICAL HERNIA REPAIR         Current Outpatient Medications   Medication Sig Dispense Refill    albuterol (PROVENTIL HFA,VENTOLIN HFA) 90 mcg/act  inhaler Inhale 2 puffs every 4 (four) hours as needed for wheezing 6.7 g 0    atorvastatin (LIPITOR) 40 mg tablet TAKE 1 TABLET BY MOUTH ONCE  DAILY 90 tablet 2    busPIRone (BUSPAR) 5 mg tablet Take 1 tablet (5 mg total) by mouth 2 (two) times a day 60 tablet 5    DULoxetine (CYMBALTA) 60 mg delayed release capsule TAKE 1 CAPSULE BY MOUTH DAILY 90 capsule 2    fexofenadine (ALLEGRA) 180 MG tablet Take 180 mg by mouth daily      furosemide (LASIX) 40 mg tablet Take 40 mg by mouth daily      gabapentin (NEURONTIN) 300 mg capsule TAKE 2 CAPSULES BY MOUTH 3 TIMES DAILY 540 capsule 2    glipiZIDE (GLUCOTROL XL) 10 mg 24 hr tablet Take 1 tablet (10 mg total) by mouth daily 90 tablet 1    hydroCHLOROthiazide 25 mg tablet Take 1 tablet (25 mg total) by mouth daily 90 tablet 1    ibuprofen (MOTRIN) 600 mg tablet Take 1 tablet (600 mg total) by mouth every 6 (six) hours as needed for mild pain 30 tablet 0    insulin glargine (Toujeo Max SoloStar) 300 units/mL CONCENTRATED U-300 injection pen (2-unit dial) Inject 45 Units under the skin daily at bedtime (Patient taking differently: Inject 44 Units under the skin daily at bedtime) 6 mL 11    moxifloxacin (VIGAMOX) 0.5 % ophthalmic solution instill 1 drop into left eye four times a day as directed for BEFORE AND AFTER SURGERY (Patient not taking: Reported on 4/24/2024)      omeprazole (PriLOSEC) 40 MG capsule TAKE 1 CAPSULE BY MOUTH DAILY 90 capsule 1    prednisoLONE acetate (PRED FORTE) 1 % ophthalmic suspension instill 1 drop into left eye four times a day as directed USE AFTER SURGERY (Patient not taking: Reported on 4/24/2024)       No current facility-administered medications for this visit.        Allergies   Allergen Reactions    Penicillins Throat Swelling and Tongue Swelling    Pollen Extract Abdominal Pain     RUNNY NOSE, EYES WATERY, NASAL DRIP ETC     Behavioral Health Psychotherapy Progress Note    Psychotherapy Provided: Individual Psychotherapy     1. Adjustment  "disorder with mixed anxiety and depressed mood          DATA: Hattie Patton is a 68 y.o. female presenting to this virtual session for the supportive tx of an adjustment d.o.  Pt did not present to [facility] now and was contacted via phone.  She related that MO  this week and that she forgot about session, however did want to talk.  Session began when she joined video.  Pt shared that she is consumed with thinking about memories of MO and can't think of anything else.  She feels the manner in which MO  was unfair.  Offered education re grief and loss, normalized her feelings.  She notes that she plans to keep Mo's urn as this brings her sense of peace and comfort.  She is also comforted with prayers. Pt did not review tx or safety plans.    During this session, this clinician used the following therapeutic modalities: Cognitive Behavioral Therapy, Mindfulness-based Strategies, and Supportive Psychotherapy    Substance Abuse was not addressed during this session. If the client is diagnosed with a co-occurring substance use disorder, please indicate any changes in the frequency or amount of use: NA. Stage of change for addressing substance use diagnoses: No substance use/Not applicable    ASSESSMENT:  Hattie Patton presents with a Dysthymic and anxious mood.     her affect is Tearful, which is congruent, with her mood and the content of the session. The client has not made progress on their goals.     Hattie Patton presents with a low risk of suicide, low risk of self-harm, and low risk of harm to others.    For any risk assessment that surpasses a \"low\" rating, a safety plan must be developed.    A safety plan was indicated: no  If yes, describe in detail NA    PLAN: Between sessions, Hattie Patton will review tx and safety plans, practice acceptance of feelings without judgment.  At the next session, the therapist will use Cognitive Behavioral Therapy, Mindfulness-based Strategies, and " Supportive Psychotherapy to address adjustment do..    Visit start and stop times:    06/05/24  Start Time: 1035  Stop Time: 1105  Total Visit Time: 30 minutes

## 2024-06-09 DIAGNOSIS — J40 BRONCHITIS: ICD-10-CM

## 2024-06-09 DIAGNOSIS — J30.2 SEASONAL ALLERGIES: Primary | ICD-10-CM

## 2024-06-09 RX ORDER — ALBUTEROL SULFATE 90 UG/1
2 AEROSOL, METERED RESPIRATORY (INHALATION) EVERY 4 HOURS PRN
Qty: 6.7 G | Refills: 0 | Status: SHIPPED | OUTPATIENT
Start: 2024-06-09

## 2024-06-11 RX ORDER — FEXOFENADINE HCL 180 MG/1
180 TABLET ORAL DAILY
Qty: 90 TABLET | Refills: 0 | Status: SHIPPED | OUTPATIENT
Start: 2024-06-11

## 2024-06-12 ENCOUNTER — TELEMEDICINE (OUTPATIENT)
Dept: PSYCHIATRY | Facility: CLINIC | Age: 69
End: 2024-06-12
Payer: MEDICARE

## 2024-06-12 DIAGNOSIS — F43.21 GRIEF: ICD-10-CM

## 2024-06-12 DIAGNOSIS — F43.23 ADJUSTMENT DISORDER WITH MIXED ANXIETY AND DEPRESSED MOOD: Primary | ICD-10-CM

## 2024-06-12 PROCEDURE — 90834 PSYTX W PT 45 MINUTES: CPT

## 2024-06-12 NOTE — BH TREATMENT PLAN
"Outpatient Behavioral Health Psychotherapy Treatment Plan    Hattie Patton  1955     Date of Initial Psychotherapy Assessment:   Date of Current Treatment Plan: 06/12/24  Treatment Plan Target Date: 12/11/2024  Treatment Plan Expiration Date: 12/11/2024    Diagnosis:   1. Adjustment disorder with mixed anxiety and depressed mood        2. Grief            Area(s) of Need: Anxiety, worry, grief and loss, chronic pain, depression    Long Term Goal 1 (in the client's own words): Feel calm and content most of the time     Target Date for completion: 12/11/2024     Anticipated therapeutic modalities: CBT, supportive treatment and mindfulness      People identified to complete this goal: Hattie Patton and Melissa Dunbar [facility]      Objective 1: (identify the means of measuring success in meeting the objective): Verbalize feelings associated with the loss - identifying and expressing feelings connected to loss at least once a day for the next 6 months.       Objective 2: (identify the means of measuring success in meeting the objective): Describe situations, thoughts, feelings and actions associated with anxieties and depression, their impact and functioning and attempts to resolve them at least 4 times a week for the next 6 months.       I am currently under the care of a [facility] psychiatric provider: no    My [facility] psychiatric provider is: NA    I am currently taking psychiatric medications: Yes, as prescribed    I feel that I will be ready for discharge from mental health care when I reach the following (measurable goal/objective): \"When I can deal with all the different emotions and life won't have as much conflict.\"    For children and adults who have a legal guardian:   Has there been any change to custody orders and/or guardianship status? No. If yes, attach updated documentation.    I have created my Crisis Plan and have been offered a copy of this plan    Behavioral Health Treatment Plan St " [place]: Diagnosis and Treatment Plan explained to Hattie Patton acknowledges an understanding of their diagnosis. Hattie Patton agrees to this treatment plan.    I have been offered a copy of this Treatment Plan. yes

## 2024-06-12 NOTE — PSYCH
"  Virtual Regular Visit    Verification of patient location:    Patient is located at Home in the following state in which I hold an active license [place]      Assessment/Plan:    Problem List Items Addressed This Visit       Grief     Other Visit Diagnoses       Adjustment disorder with mixed anxiety and depressed mood    -  Primary               Reason for visit is   Chief Complaint   Patient presents with    Virtual Regular Visit          Encounter provider Melissa Dunbar      Recent Visits  Date Type Provider Dept   06/05/24 Telemedicine Melissa Dunbar [facility]   Showing recent visits within past 7 days and meeting all other requirements  Today's Visits  Date Type Provider Dept   06/12/24 Telemedicine Melissa Dunbar [facility]   Showing today's visits and meeting all other requirements  Future Appointments  No visits were found meeting these conditions.  Showing future appointments within next 150 days and meeting all other requirements       The patient was identified by name and date of birth. Hattie Patton was informed that this is a telemedicine visit and that the visit is being conducted throughthe Epic Embedded platform. She agrees to proceed..  My office door was closed. No one else was in the room.  She acknowledged consent and understanding of privacy and security of the video platform. The patient has agreed to participate and understands they can discontinue the visit at any time.    Patient is aware this is a billable service.     Subjective  Hattie Patton is a 69 y.o. female presenting to this virtual session for the supportive  treatment of anxiety, depression and grief.  Completed tx plan and safety.  Pt had not reviewed info and most initial responses were \"I don't know.\"  Pt indicated that she feels ashamed of being in therapy, that it causes her more anxiety. Offered opportunity to process and normalized feelings.  Pt " "shared belief that she does not need therapy, that she only agreed to same as she thought sessions were to monitor her after change in meds.  She believes she has friends and family to talk with and does not believe she can change at this time of her life.  Encouraged pt to print safety plan and use same when feeling distressed. Treatment plan will not be signed as she is not interested in services.  Ensured pt has clinic number if she needs services in the future.  Pt agreed to utilize safety plan as needed.  Pt was sad, tearful, focused on perceived injustices and feeling defeated. She seems to want someone to rescue her as she doubts her own power to make changes.  Pt will call office if interested in services in the future.     Psychotherapy Discharge Summary    Preferred Name: Hattie Patton  YOB: 1955    Admission date to psychotherapy: 2024    Referred by: PCP    Presenting Problem:  Provider wanted to increased meds to assist with anxiety.  She is currently taking cymbalta that was recently augmented with Welbutrin.  She began have v/h.  Welbutrin was d/c and Buspar was initiated.  She does not like Buspar as she does not like   Dog  a year ago, she has been miserable  since then. New dog is Luther the [employer] Retriever. This gave her \"better feeling.\"  She has chronic pain, fell last winter and hurt wrist.  She takes Gabapentine for nerve pain.  She has medical marijuana license, sharing belief that oils were most helpful but she cannot afford same.  She is hesitant to try edibles due to memory loss.  She currently smokes marijuana.  This helps her relax.   Mo was dx with cancer in lungs, liver, spine, stomach after she presented to dr for back pain she thought was related to pain from pulling weeds.   Course of treatment included : individual therapy     Progress/Outcome of Treatment Goals (brief summary of course of treatment) Pt participated in 3 sessions; her mo  following " first session.      Treatment Complications (if any): Death of Mother     Treatment Progress: Pt was not invested in treatment; see above     Current [facility] Psychiatric Provider: NA    Discharge Medications include:     Discharge Date: 6/12/2024    Discharge Diagnosis:   1. Adjustment disorder with mixed anxiety and depressed mood        2. Grief            Criteria for Discharge:  Pt declined service    Aftercare recommendations include (include specific referral names and phone numbers, if appropriate): Pt will contact OP clinic if she determines services are needed in the future.    Prognosis: fair    Past Medical History:   Diagnosis Date    Cancer (HCC)     Diabetes mellitus (HCC)     Diverticulitis of colon     Hypertension        Past Surgical History:   Procedure Laterality Date    CHOLECYSTECTOMY      COLONOSCOPY      GALLBLADDER SURGERY      HYSTERECTOMY      UMBILICAL HERNIA REPAIR         Current Outpatient Medications   Medication Sig Dispense Refill    albuterol (PROVENTIL HFA,VENTOLIN HFA) 90 mcg/act inhaler Inhale 2 puffs every 4 (four) hours as needed for wheezing 6.7 g 0    atorvastatin (LIPITOR) 40 mg tablet TAKE 1 TABLET BY MOUTH ONCE  DAILY 90 tablet 2    busPIRone (BUSPAR) 5 mg tablet Take 1 tablet (5 mg total) by mouth 2 (two) times a day 60 tablet 5    DULoxetine (CYMBALTA) 60 mg delayed release capsule TAKE 1 CAPSULE BY MOUTH DAILY 90 capsule 2    fexofenadine (ALLEGRA) 180 MG tablet Take 1 tablet (180 mg total) by mouth daily 90 tablet 0    furosemide (LASIX) 40 mg tablet Take 40 mg by mouth daily      gabapentin (NEURONTIN) 300 mg capsule TAKE 2 CAPSULES BY MOUTH 3 TIMES DAILY 540 capsule 2    glipiZIDE (GLUCOTROL XL) 10 mg 24 hr tablet Take 1 tablet (10 mg total) by mouth daily 90 tablet 1    hydroCHLOROthiazide 25 mg tablet Take 1 tablet (25 mg total) by mouth daily 90 tablet 1    ibuprofen (MOTRIN) 600 mg tablet Take 1 tablet (600 mg total) by mouth every 6 (six) hours as needed for mild  pain 30 tablet 0    insulin glargine (Toujeo Max SoloStar) 300 units/mL CONCENTRATED U-300 injection pen (2-unit dial) Inject 45 Units under the skin daily at bedtime (Patient taking differently: Inject 44 Units under the skin daily at bedtime) 6 mL 11    moxifloxacin (VIGAMOX) 0.5 % ophthalmic solution instill 1 drop into left eye four times a day as directed for BEFORE AND AFTER SURGERY (Patient not taking: Reported on 4/24/2024)      omeprazole (PriLOSEC) 40 MG capsule TAKE 1 CAPSULE BY MOUTH DAILY 90 capsule 1    prednisoLONE acetate (PRED FORTE) 1 % ophthalmic suspension instill 1 drop into left eye four times a day as directed USE AFTER SURGERY (Patient not taking: Reported on 4/24/2024)       No current facility-administered medications for this visit.        Allergies   Allergen Reactions    Penicillins Throat Swelling and Tongue Swelling    Pollen Extract Abdominal Pain     RUNNY NOSE, EYES WATERY, NASAL DRIP ETC

## 2024-06-12 NOTE — BH CRISIS PLAN
Client Name: Hattie Patton       Client YOB: 1955    nOelia Safety Plan      Creation Date: 6/12/24    Created By: Melissa Dunbar       Step 1: Warning Signs:   Warning Signs   Dwelling on things   Pain takes over   Feeling defeated and hopeless            Step 2: Internal Coping Strategies:   Internal Coping Strategies   Watching videos on line   Talk on phone   Text   Change topic   Practice relaxation and mindfulness exercises            Step 3: People and social settings that provide distraction:   Name Contact Information   Roni In home and in phone   Neice In phone   Daughter In phone   Cousin In phone    Places   Pond, watching fish   Porch           Step 4: People whom I can ask for help during a crisis:      Name Contact Information    Roni In home and in phone      Step 5: Professionals or agencies I can contact during a crisis:      Clinican/Agency Name Phone Emergency Contact    Melissa Dunbar, [facility] [phone]       [facility] Emergency Department Emergency Department Phone Emergency Department Address    [address]        Crisis Phone Numbers:   Suicide Prevention Lifeline: Call or Text  398 Crisis Text Line: Text HOME to 647-350   Please note: Some [facility] do not have a separate number for Child/Adolescent specific crisis. If your county is not listed under Child/Adolescent, please call the adult number for your county      Adult Crisis Numbers: Child/Adolescent Crisis Numbers   [facility]: [phone] [facility]: [phone]   [facility]: [phone] [facility]: [phone]   [facility]: [phone] [place]: [phone]   [facility]: [phone] [place]: [phone]   [place] [facility]: [phone]   [facility]: [phone]   [facility]: [phone]   [facility]: [phone] (daytime) [phone]  (after hours, weekends, holidays)      Step 6: Making the environment safer (plan for lethal means safety):   Patient did not identify any lethal methods: Yes     Optional: What is most important to me and worth living for?   Family     [place] Safety Plan. Amina Kuhn and Italo Patterson. Used with permission of the authors.

## 2024-06-20 ENCOUNTER — OFFICE VISIT (OUTPATIENT)
Age: 69
End: 2024-06-20
Payer: MEDICARE

## 2024-06-20 VITALS
OXYGEN SATURATION: 96 % | DIASTOLIC BLOOD PRESSURE: 64 MMHG | HEIGHT: 62 IN | RESPIRATION RATE: 16 BRPM | BODY MASS INDEX: 49.32 KG/M2 | WEIGHT: 268 LBS | SYSTOLIC BLOOD PRESSURE: 137 MMHG | HEART RATE: 71 BPM | TEMPERATURE: 97.5 F

## 2024-06-20 DIAGNOSIS — E11.42 TYPE 2 DIABETES MELLITUS WITH DIABETIC POLYNEUROPATHY, WITH LONG-TERM CURRENT USE OF INSULIN (HCC): Primary | ICD-10-CM

## 2024-06-20 DIAGNOSIS — E78.2 MIXED HYPERLIPIDEMIA: ICD-10-CM

## 2024-06-20 DIAGNOSIS — M17.0 PRIMARY OSTEOARTHRITIS OF BOTH KNEES: ICD-10-CM

## 2024-06-20 DIAGNOSIS — Z79.4 TYPE 2 DIABETES MELLITUS WITH DIABETIC POLYNEUROPATHY, WITH LONG-TERM CURRENT USE OF INSULIN (HCC): Primary | ICD-10-CM

## 2024-06-20 DIAGNOSIS — F33.9 DEPRESSION, RECURRENT (HCC): ICD-10-CM

## 2024-06-20 DIAGNOSIS — I10 PRIMARY HYPERTENSION: ICD-10-CM

## 2024-06-20 DIAGNOSIS — M25.532 LEFT WRIST PAIN: ICD-10-CM

## 2024-06-20 PROBLEM — R78.81 POSITIVE BLOOD CULTURE: Status: RESOLVED | Noted: 2024-02-15 | Resolved: 2024-06-20

## 2024-06-20 PROBLEM — R65.10 SIRS (SYSTEMIC INFLAMMATORY RESPONSE SYNDROME) (HCC): Status: RESOLVED | Noted: 2024-02-13 | Resolved: 2024-06-20

## 2024-06-20 PROBLEM — J40 BRONCHITIS: Status: RESOLVED | Noted: 2024-03-12 | Resolved: 2024-06-20

## 2024-06-20 PROCEDURE — G2211 COMPLEX E/M VISIT ADD ON: HCPCS | Performed by: FAMILY MEDICINE

## 2024-06-20 PROCEDURE — 99214 OFFICE O/P EST MOD 30 MIN: CPT | Performed by: FAMILY MEDICINE

## 2024-06-20 RX ORDER — INSULIN GLARGINE 300 U/ML
48 INJECTION, SOLUTION SUBCUTANEOUS
Start: 2024-06-20

## 2024-06-20 RX ORDER — MELOXICAM 15 MG/1
15 TABLET ORAL DAILY PRN
Qty: 30 TABLET | Refills: 0 | Status: SHIPPED | OUTPATIENT
Start: 2024-06-20

## 2024-06-20 NOTE — PROGRESS NOTES
Diabetic Foot Exam    Patient's shoes and socks removed.    Right Foot/Ankle   Right Foot Inspection  Skin Exam: skin normal and skin intact. No dry skin, no warmth, no callus, no erythema, no maceration, no abnormal color, no pre-ulcer, no ulcer and no callus.     Toe Exam: ROM and strength within normal limits.     Sensory   Monofilament testing: intact    Vascular  Capillary refills: < 3 seconds  The right DP pulse is 1+.     Left Foot/Ankle  Left Foot Inspection  Skin Exam: skin normal and skin intact. No dry skin, no warmth, no erythema, no maceration, normal color, no pre-ulcer, no ulcer and no callus.     Toe Exam: ROM and strength within normal limits.     Sensory   Monofilament testing: intact    Vascular  Capillary refills: < 3 seconds  The left DP pulse is 1+.     Assign Risk Category  No deformity present  Loss of protective sensation  Weak pulses  Risk: 2

## 2024-06-20 NOTE — PROGRESS NOTES
Ambulatory Visit  Name: Hattie Patton      : 1955      MRN: [id number]  Encounter Provider: Linda Patton DO  Encounter Date: 2024   Encounter department: [facility] [place]    Assessment & Plan   1. Type 2 diabetes mellitus with diabetic polyneuropathy, with long-term current use of insulin (HCC)-fair control with recent A1c of 7.5.  Patient to continue basal therapy nightly and glipizide 10 mg daily  -     insulin glargine (Toujeo Max SoloStar) 300 units/mL CONCENTRATED U-300 injection pen (2-unit dial); Inject 48 Units under the skin daily at bedtime  -     Ambulatory Referral to Podiatry; Future  -     Albumin / creatinine urine ratio; Future; Expected date: 2024  -     Comprehensive metabolic panel; Future; Expected date: 2024  -     Hemoglobin A1C; Future; Expected date: 2024  -     Lipid Panel with Direct LDL reflex; Future; Expected date: 2024  2. Mixed hyperlipidemia- on statin  3. Primary hypertension-BP fair today.  Patient has not been consistently taking her diuretic therapy.  Patient encouraged to stay on Lasix 40 mg daily and hydrochlorothiazide 25 mg daily.  Will consider adding ACE at follow-up if BP still greater than 130s over 90s     4. Left wrist pain-secondary to osteoarthritis.  Will provide NSAID.  Patient encouraged to follow-up with orthopedics and OT  -     Ambulatory Referral to PT/OT Hand Therapy; Future  -     Ambulatory Referral to Orthopedic Surgery; Future  -     meloxicam (MOBIC) 15 mg tablet; Take 1 tablet (15 mg total) by mouth daily as needed for moderate pain  5. Depression, recurrent (HCC)-in remission with BuSpar 5 mg twice daily and Cymbalta 60 mg daily.  Patient is following with psychiatry    6. Primary osteoarthritis of both knees-chronic.  Patient to follow-up with specialist  -     Ambulatory Referral to Orthopedic Surgery; Future       History of Present Illness     Patient presents for 6-month  "follow-up.  Discussed recent cataract removal.  Discussed chronic osteoarthritis of the knees and and left wrist pain.        Review of Systems   Constitutional:  Negative for fever.   Respiratory:  Negative for shortness of breath.    Cardiovascular:  Negative for chest pain.   Gastrointestinal:  Negative for constipation, nausea and vomiting.   Musculoskeletal:  Positive for arthralgias and gait problem.   Psychiatric/Behavioral:  Negative for sleep disturbance.        Objective     /64 (BP Location: Left arm, Patient Position: Sitting, Cuff Size: Large)   Pulse 71   Temp 97.5 °F (36.4 °C) (Tympanic)   Resp 16   Ht 5' 2\" (1.575 m)   Wt 122 kg (268 lb)   SpO2 96%   BMI 49.02 kg/m²     Physical Exam  HENT:      Head: Normocephalic.      Right Ear: External ear normal.      Left Ear: External ear normal.   Eyes:      Conjunctiva/sclera: Conjunctivae normal.   Cardiovascular:      Rate and Rhythm: Normal rate and regular rhythm.   Pulmonary:      Effort: Pulmonary effort is normal.      Breath sounds: Normal breath sounds.   Musculoskeletal:      Right lower leg: No edema.      Left lower leg: No edema.   Neurological:      Mental Status: She is alert and oriented to person, place, and time.      Gait: Gait abnormal.   Psychiatric:         Mood and Affect: Mood normal.         Behavior: Behavior normal.       Administrative Statements           "

## 2024-06-24 ENCOUNTER — TELEPHONE (OUTPATIENT)
Age: 69
End: 2024-06-24

## 2024-06-24 ENCOUNTER — PATIENT MESSAGE (OUTPATIENT)
Age: 69
End: 2024-06-24

## 2024-06-24 DIAGNOSIS — E11.42 TYPE 2 DIABETES MELLITUS WITH DIABETIC POLYNEUROPATHY, WITH LONG-TERM CURRENT USE OF INSULIN (HCC): ICD-10-CM

## 2024-06-24 DIAGNOSIS — Z79.4 TYPE 2 DIABETES MELLITUS WITH DIABETIC POLYNEUROPATHY, WITH LONG-TERM CURRENT USE OF INSULIN (HCC): ICD-10-CM

## 2024-06-24 NOTE — TELEPHONE ENCOUNTER
Please start prior auth for Hattie Patton the Insurance has paperwork and questions they want to be completed... documents scanned into media

## 2024-06-25 RX ORDER — GLIPIZIDE 10 MG/1
20 TABLET, FILM COATED, EXTENDED RELEASE ORAL DAILY
Qty: 180 TABLET | Refills: 1 | Status: SHIPPED | OUTPATIENT
Start: 2024-06-25

## 2024-07-04 DIAGNOSIS — J40 BRONCHITIS: ICD-10-CM

## 2024-07-05 RX ORDER — ALBUTEROL SULFATE 90 UG/1
AEROSOL, METERED RESPIRATORY (INHALATION)
Qty: 18 G | Refills: 1 | Status: SHIPPED | OUTPATIENT
Start: 2024-07-05

## 2024-07-09 ENCOUNTER — PATIENT MESSAGE (OUTPATIENT)
Age: 69
End: 2024-07-09

## 2024-07-09 DIAGNOSIS — M47.816 LUMBAR FACET ARTHROPATHY: Primary | ICD-10-CM

## 2024-07-10 DIAGNOSIS — E11.42 TYPE 2 DIABETES MELLITUS WITH DIABETIC POLYNEUROPATHY, WITH LONG-TERM CURRENT USE OF INSULIN (HCC): ICD-10-CM

## 2024-07-10 DIAGNOSIS — Z79.4 TYPE 2 DIABETES MELLITUS WITH DIABETIC POLYNEUROPATHY, WITH LONG-TERM CURRENT USE OF INSULIN (HCC): ICD-10-CM

## 2024-07-10 RX ORDER — GLIPIZIDE 10 MG/1
20 TABLET, FILM COATED, EXTENDED RELEASE ORAL DAILY
Qty: 180 TABLET | Refills: 0 | Status: SHIPPED | OUTPATIENT
Start: 2024-07-10

## 2024-07-17 DIAGNOSIS — M25.532 LEFT WRIST PAIN: ICD-10-CM

## 2024-07-17 RX ORDER — MELOXICAM 15 MG/1
15 TABLET ORAL DAILY PRN
Qty: 30 TABLET | Refills: 0 | Status: SHIPPED | OUTPATIENT
Start: 2024-07-17 | End: 2024-07-17

## 2024-07-17 RX ORDER — CELECOXIB 100 MG/1
100 CAPSULE ORAL 2 TIMES DAILY
Qty: 14 CAPSULE | Refills: 0 | Status: SHIPPED | OUTPATIENT
Start: 2024-07-17

## 2024-07-23 ENCOUNTER — APPOINTMENT (OUTPATIENT)
Dept: RADIOLOGY | Facility: CLINIC | Age: 69
End: 2024-07-23
Payer: MEDICARE

## 2024-07-23 ENCOUNTER — OFFICE VISIT (OUTPATIENT)
Dept: OBGYN CLINIC | Facility: CLINIC | Age: 69
End: 2024-07-23
Payer: MEDICARE

## 2024-07-23 VITALS
OXYGEN SATURATION: 98 % | HEIGHT: 62 IN | SYSTOLIC BLOOD PRESSURE: 181 MMHG | HEART RATE: 74 BPM | DIASTOLIC BLOOD PRESSURE: 80 MMHG | TEMPERATURE: 98.5 F | WEIGHT: 268 LBS | BODY MASS INDEX: 49.32 KG/M2

## 2024-07-23 DIAGNOSIS — M54.2 NECK PAIN: ICD-10-CM

## 2024-07-23 DIAGNOSIS — M47.22 OSTEOARTHRITIS OF SPINE WITH RADICULOPATHY, CERVICAL REGION: Primary | ICD-10-CM

## 2024-07-23 DIAGNOSIS — M25.532 LEFT WRIST PAIN: ICD-10-CM

## 2024-07-23 DIAGNOSIS — M54.12 RADICULOPATHY, CERVICAL REGION: ICD-10-CM

## 2024-07-23 DIAGNOSIS — M17.0 PRIMARY OSTEOARTHRITIS OF BOTH KNEES: ICD-10-CM

## 2024-07-23 PROCEDURE — 72040 X-RAY EXAM NECK SPINE 2-3 VW: CPT

## 2024-07-23 PROCEDURE — 99203 OFFICE O/P NEW LOW 30 MIN: CPT | Performed by: ORTHOPAEDIC SURGERY

## 2024-07-23 RX ORDER — DIAZEPAM 5 MG/1
5 TABLET ORAL EVERY 6 HOURS PRN
Qty: 1 TABLET | Refills: 0 | Status: SHIPPED | OUTPATIENT
Start: 2024-07-23

## 2024-07-23 NOTE — PROGRESS NOTES
Assessment/Plan:   Diagnoses and all orders for this visit:    Osteoarthritis of spine with radiculopathy, cervical region  -     MRI cervical spine wo contrast; Future    Left wrist pain  -     Ambulatory Referral to Orthopedic Surgery    Primary osteoarthritis of both knees  -     Ambulatory Referral to Orthopedic Surgery    Neck pain  -     XR spine cervical 2 or 3 vw injury; Future       Discussed with patient that her exam and images are most consistent with osteoarthritis of the cervical spine. The patient experiences radicular symptoms as well. An MRI of the c-spine was ordered for further evaluation. She will follow-up once the MRI is complete. The patient expresses understanding and is in agreement with today's treatment plan.       Clinically, the patient has several radiculopathy.  An MRI was ordered of her cervical spine.  Return back once MRI is complete.  It appears that her symptomatology are is emanating from her neck and not her hands or elbows or wrists    Subjective:   Patient ID: Hattie Patton  1955     HPI  Patient is a 69 y.o. female who presents for initial evaluation of her wrist and neck. The patient states that she experienced a fall in the early spring. She states that she fell onto an outstretched hand. She experienced significant pain in the wrist after the fall. She states that the pain begins in the wrist and causes numbness and tingling into her finger tips and pain that travels up her entire arm into her neck. She states that she is unsure if the neck pain began prior to the fall or afterward.     The following portions of the patient's history were reviewed and updated as appropriate:  Past medical history, past surgical history, Family history, social history, current medications and allergies    Past Medical History:   Diagnosis Date    Cancer (HCC)     Diabetes mellitus (HCC)     Diverticulitis of colon     Hypertension        Past Surgical History:   Procedure Laterality  Date    CHOLECYSTECTOMY      COLONOSCOPY      GALLBLADDER SURGERY      HYSTERECTOMY      INTRACAPSULAR CATARACT EXTRACTION Bilateral     UMBILICAL HERNIA REPAIR         Family History   Problem Relation Age of Onset    Heart disease Mother     Cancer Father     Hypertension Father     Cancer Sister     Mental illness Sister     Cancer Paternal Aunt     Diabetes Brother        Social History     Socioeconomic History    Marital status: /Civil Union     Spouse name: None    Number of children: None    Years of education: None    Highest education level: None   Occupational History    None   Tobacco Use    Smoking status: Former     Current packs/day: 0.00     Types: Cigarettes     Quit date: 2000     Years since quittin.6    Smokeless tobacco: Former   Vaping Use    Vaping status: Never Used   Substance and Sexual Activity    Alcohol use: Never     Comment: socially - rare    Drug use: Yes     Frequency: 7.0 times per week     Types: Marijuana     Comment: every day medical marijuana, oil or smoke    Sexual activity: Not Currently     Partners: Male     Birth control/protection: None   Other Topics Concern    None   Social History Narrative    None     Social Determinants of Health     Financial Resource Strain: High Risk (10/16/2023)    Overall Financial Resource Strain (CARDIA)     Difficulty of Paying Living Expenses: Hard   Food Insecurity: No Food Insecurity (2024)    Hunger Vital Sign     Worried About Running Out of Food in the Last Year: Never true     Ran Out of Food in the Last Year: Never true   Transportation Needs: No Transportation Needs (2024)    PRAPARE - Transportation     Lack of Transportation (Medical): No     Lack of Transportation (Non-Medical): No   Physical Activity: Inactive (2024)    Exercise Vital Sign     Days of Exercise per Week: 0 days     Minutes of Exercise per Session: 0 min   Stress: Stress Concern Present (10/28/2022)    [facility]  Occupational Health - Occupational Stress Questionnaire     Feeling of Stress : Rather much   Social Connections: Not on file   Intimate Partner Violence: Not on file   Housing Stability: Low Risk  (2/16/2024)    Housing Stability Vital Sign     Unable to Pay for Housing in the Last Year: No     Number of Times Moved in the Last Year: 1     Homeless in the Last Year: No         Current Outpatient Medications:     albuterol (PROVENTIL HFA,VENTOLIN HFA) 90 mcg/act inhaler, TAKE 2 PUFFS BY MOUTH EVERY 4 HOURS AS NEEDED FOR WHEEZE, Disp: 18 g, Rfl: 1    atorvastatin (LIPITOR) 40 mg tablet, TAKE 1 TABLET BY MOUTH ONCE  DAILY, Disp: 90 tablet, Rfl: 2    busPIRone (BUSPAR) 5 mg tablet, Take 1 tablet (5 mg total) by mouth 2 (two) times a day, Disp: 60 tablet, Rfl: 5    celecoxib (CeleBREX) 100 mg capsule, Take 1 capsule (100 mg total) by mouth 2 (two) times a day, Disp: 14 capsule, Rfl: 0    DULoxetine (CYMBALTA) 60 mg delayed release capsule, TAKE 1 CAPSULE BY MOUTH DAILY, Disp: 90 capsule, Rfl: 2    fexofenadine (ALLEGRA) 180 MG tablet, Take 1 tablet (180 mg total) by mouth daily, Disp: 90 tablet, Rfl: 0    furosemide (LASIX) 40 mg tablet, Take 40 mg by mouth daily, Disp: , Rfl:     gabapentin (NEURONTIN) 300 mg capsule, TAKE 2 CAPSULES BY MOUTH 3 TIMES DAILY, Disp: 540 capsule, Rfl: 2    glipiZIDE (GLUCOTROL XL) 10 mg 24 hr tablet, Take 2 tablets (20 mg total) by mouth daily, Disp: 180 tablet, Rfl: 0    hydroCHLOROthiazide 25 mg tablet, Take 1 tablet (25 mg total) by mouth daily, Disp: 90 tablet, Rfl: 1    insulin glargine (Toujeo Max SoloStar) 300 units/mL CONCENTRATED U-300 injection pen (2-unit dial), Inject 48 Units under the skin daily at bedtime, Disp: , Rfl:     omeprazole (PriLOSEC) 40 MG capsule, TAKE 1 CAPSULE BY MOUTH DAILY, Disp: 90 capsule, Rfl: 1    Allergies   Allergen Reactions    Penicillins Throat Swelling and Tongue Swelling    Pollen Extract Abdominal Pain     RUNNY NOSE, EYES WATERY, NASAL DRIP ETC  "      Review of Systems   Constitutional:  Negative for chills, fever and unexpected weight change.   HENT:  Negative for hearing loss, nosebleeds and sore throat.    Eyes:  Negative for pain, redness and visual disturbance.   Respiratory:  Negative for cough, shortness of breath and wheezing.    Cardiovascular:  Negative for chest pain, palpitations and leg swelling.   Gastrointestinal:  Negative for abdominal pain, nausea and vomiting.   Endocrine: Negative for polydipsia and polyuria.   Genitourinary:  Negative for dysuria and hematuria.   Skin:  Negative for rash and wound.   Neurological:  Negative for dizziness, numbness and headaches.   Psychiatric/Behavioral:  Negative for decreased concentration and suicidal ideas. The patient is not nervous/anxious.    All other systems reviewed and are negative.       Objective:  BP (!) 181/80 (BP Location: Left arm, Patient Position: Sitting, Cuff Size: Large)   Pulse 74   Temp 98.5 °F (36.9 °C) (Tympanic)   Ht 5' 2\" (1.575 m)   Wt 122 kg (268 lb)   SpO2 98%   BMI 49.02 kg/m²     Ortho Exam  Cervical Spine:   Active range of motion WNL and pain free.    There is no midline tenderness.    There is no  paraspinal hypertonicity and tenderness.    5/5 strength with shoulder abduction and forward flexion  5/5 strength elbow flexion and extension   5/5 strength wrist flexion and extension   5/5 strength finger abduction, adduction, opposition   Spurling test is  negative  Negative Tinel's at cubital tunnel  Negative Tinel's at carpal tunnel  Demonstrates normal elbow, wrist, and finger motion  2+  distal radial pulse with brisk capillary refill to the fingers  Radial, median, ulnar and motor  and sensory distribution intact  Sensation to light touch intact distally      Physical Exam  HENT:      Head: Normocephalic and atraumatic.      Nose: Nose normal.   Eyes:      Conjunctiva/sclera: Conjunctivae normal.   Cardiovascular:      Rate and Rhythm: Normal rate. "   Pulmonary:      Effort: Pulmonary effort is normal.   Musculoskeletal:      Cervical back: Neck supple.   Skin:     General: Skin is warm and dry.      Capillary Refill: Capillary refill takes less than 2 seconds.   Neurological:      Mental Status: She is alert and oriented to person, place, and time.   Psychiatric:         Mood and Affect: Mood normal.         Behavior: Behavior normal.          Diagnostic Test Review:    X-Ray of c-spine obtained on 7/23/2024 were reviewed and demonstrate degenerative changes of the cervical spine.    X-Ray of left wrist obtained on 4/24/2024 were reviewed and demonstrate osteoarthritis of first CMC joint and carpal bones. No acute fractures or abnormalities.      Procedures   None performed.       Scribe Attestation      I,:  eLonel Burton am acting as a scribe while in the presence of the attending physician.:       I,:  Jerry Salgado, DO personally performed the services described in this documentation    as scribed in my presence.:

## 2024-07-27 DIAGNOSIS — I10 PRIMARY HYPERTENSION: ICD-10-CM

## 2024-07-27 DIAGNOSIS — F32.A ANXIETY AND DEPRESSION: ICD-10-CM

## 2024-07-27 DIAGNOSIS — F41.9 ANXIETY AND DEPRESSION: ICD-10-CM

## 2024-07-28 RX ORDER — BUSPIRONE HYDROCHLORIDE 5 MG/1
5 TABLET ORAL 2 TIMES DAILY
Qty: 60 TABLET | Refills: 1 | Status: SHIPPED | OUTPATIENT
Start: 2024-07-28

## 2024-07-28 RX ORDER — HYDROCHLOROTHIAZIDE 25 MG/1
25 TABLET ORAL DAILY
Qty: 90 TABLET | Refills: 1 | Status: SHIPPED | OUTPATIENT
Start: 2024-07-28

## 2024-08-01 ENCOUNTER — APPOINTMENT (OUTPATIENT)
Age: 69
End: 2024-08-01
Payer: MEDICARE

## 2024-08-01 DIAGNOSIS — Z79.4 TYPE 2 DIABETES MELLITUS WITH DIABETIC POLYNEUROPATHY, WITH LONG-TERM CURRENT USE OF INSULIN (HCC): ICD-10-CM

## 2024-08-01 DIAGNOSIS — E11.42 TYPE 2 DIABETES MELLITUS WITH DIABETIC POLYNEUROPATHY, WITH LONG-TERM CURRENT USE OF INSULIN (HCC): ICD-10-CM

## 2024-08-01 LAB
ALBUMIN SERPL BCG-MCNC: 4 G/DL (ref 3.5–5)
ALP SERPL-CCNC: 90 U/L (ref 34–104)
ALT SERPL W P-5'-P-CCNC: 18 U/L (ref 7–52)
ANION GAP SERPL CALCULATED.3IONS-SCNC: 9 MMOL/L (ref 4–13)
AST SERPL W P-5'-P-CCNC: 15 U/L (ref 13–39)
BILIRUB SERPL-MCNC: 0.64 MG/DL (ref 0.2–1)
BUN SERPL-MCNC: 12 MG/DL (ref 5–25)
CALCIUM SERPL-MCNC: 9.4 MG/DL (ref 8.4–10.2)
CHLORIDE SERPL-SCNC: 103 MMOL/L (ref 96–108)
CHOLEST SERPL-MCNC: 138 MG/DL
CO2 SERPL-SCNC: 32 MMOL/L (ref 21–32)
CREAT SERPL-MCNC: 0.64 MG/DL (ref 0.6–1.3)
EST. AVERAGE GLUCOSE BLD GHB EST-MCNC: 180 MG/DL
GFR SERPL CREATININE-BSD FRML MDRD: 91 ML/MIN/1.73SQ M
GLUCOSE P FAST SERPL-MCNC: 134 MG/DL (ref 65–99)
HBA1C MFR BLD: 7.9 %
HDLC SERPL-MCNC: 54 MG/DL
LDLC SERPL CALC-MCNC: 65 MG/DL (ref 0–100)
POTASSIUM SERPL-SCNC: 3.7 MMOL/L (ref 3.5–5.3)
PROT SERPL-MCNC: 7 G/DL (ref 6.4–8.4)
SODIUM SERPL-SCNC: 144 MMOL/L (ref 135–147)
TRIGL SERPL-MCNC: 94 MG/DL

## 2024-08-01 PROCEDURE — 80061 LIPID PANEL: CPT

## 2024-08-01 PROCEDURE — 36415 COLL VENOUS BLD VENIPUNCTURE: CPT

## 2024-08-01 PROCEDURE — 83036 HEMOGLOBIN GLYCOSYLATED A1C: CPT

## 2024-08-01 PROCEDURE — 80053 COMPREHEN METABOLIC PANEL: CPT

## 2024-08-02 ENCOUNTER — HOSPITAL ENCOUNTER (OUTPATIENT)
Dept: MRI IMAGING | Facility: HOSPITAL | Age: 69
End: 2024-08-02
Attending: ORTHOPAEDIC SURGERY
Payer: MEDICARE

## 2024-08-02 DIAGNOSIS — M47.22 OSTEOARTHRITIS OF SPINE WITH RADICULOPATHY, CERVICAL REGION: ICD-10-CM

## 2024-08-02 PROCEDURE — 72141 MRI NECK SPINE W/O DYE: CPT

## 2024-08-06 ENCOUNTER — TELEPHONE (OUTPATIENT)
Age: 69
End: 2024-08-06

## 2024-08-06 NOTE — TELEPHONE ENCOUNTER
----- Message from Linda Patton DO sent at 8/6/2024  9:04 AM EDT -----  Cholesterol levels are great.  Metabolic panel is normal.  A1c did increase from 7.5 to 7.9.  I recommend continue to work on dietary changes to help with this issue.  She should also increase her nighttime insulin to 50 units

## 2024-08-20 ENCOUNTER — TELEPHONE (OUTPATIENT)
Age: 69
End: 2024-08-20

## 2024-08-20 DIAGNOSIS — M47.22 OSTEOARTHRITIS OF SPINE WITH RADICULOPATHY, CERVICAL REGION: Primary | ICD-10-CM

## 2024-08-20 NOTE — TELEPHONE ENCOUNTER
Caller: Patient    Doctor: Naomi    Reason for call:     Patient calling to let the dr know she did not complete the MRI on 8/2/24, she is claustrophic  and could not do the imaging.  She is asking for sedation to complete the MRI for the spine, Osteoarthritis of spine / neck with radiculopathy, cervical region.   Who would the dr recommend to see for the spine after the MRI?  She is asking for a call back.    Call back#: [phone]

## 2024-08-20 NOTE — TELEPHONE ENCOUNTER
Called and spoke w/pt and relayed Dr Salgado's msgs for her to schedule MRI under conscious sedation (spelled it for pt) and gave number for central scheduling .  Explained that this is done w/anesthesia present and she will need someone to bring her to and from. States her  will.  MRI order is in her chart for when she calls CS.  Also advised pt to make an appt for f/u 1 week after MRI to go over results w/Dr Salgado. States she will do that. No further questions/concerns.

## 2024-09-07 DIAGNOSIS — J30.2 SEASONAL ALLERGIES: ICD-10-CM

## 2024-09-07 RX ORDER — FEXOFENADINE HCL 180 MG/1
180 TABLET ORAL DAILY
Qty: 90 TABLET | Refills: 0 | Status: SHIPPED | OUTPATIENT
Start: 2024-09-07

## 2024-09-22 DIAGNOSIS — F41.9 ANXIETY AND DEPRESSION: ICD-10-CM

## 2024-09-22 DIAGNOSIS — F32.A ANXIETY AND DEPRESSION: ICD-10-CM

## 2024-09-23 RX ORDER — BUSPIRONE HYDROCHLORIDE 5 MG/1
5 TABLET ORAL 2 TIMES DAILY
Qty: 60 TABLET | Refills: 3 | Status: SHIPPED | OUTPATIENT
Start: 2024-09-23

## 2024-09-28 DIAGNOSIS — E78.2 MIXED HYPERLIPIDEMIA: ICD-10-CM

## 2024-09-28 DIAGNOSIS — E11.42 TYPE 2 DIABETES MELLITUS WITH DIABETIC POLYNEUROPATHY, WITH LONG-TERM CURRENT USE OF INSULIN (HCC): ICD-10-CM

## 2024-09-28 DIAGNOSIS — Z79.4 TYPE 2 DIABETES MELLITUS WITH DIABETIC POLYNEUROPATHY, WITH LONG-TERM CURRENT USE OF INSULIN (HCC): ICD-10-CM

## 2024-09-29 DIAGNOSIS — E11.42 TYPE 2 DIABETES MELLITUS WITH DIABETIC POLYNEUROPATHY, WITH LONG-TERM CURRENT USE OF INSULIN (HCC): ICD-10-CM

## 2024-09-29 DIAGNOSIS — Z79.4 TYPE 2 DIABETES MELLITUS WITH DIABETIC POLYNEUROPATHY, WITH LONG-TERM CURRENT USE OF INSULIN (HCC): ICD-10-CM

## 2024-09-29 RX ORDER — GLIPIZIDE 10 MG/1
20 TABLET, FILM COATED, EXTENDED RELEASE ORAL DAILY
Qty: 180 TABLET | Refills: 1 | Status: SHIPPED | OUTPATIENT
Start: 2024-09-29

## 2024-09-29 RX ORDER — ATORVASTATIN CALCIUM 40 MG/1
40 TABLET, FILM COATED ORAL DAILY
Qty: 90 TABLET | Refills: 1 | Status: SHIPPED | OUTPATIENT
Start: 2024-09-29

## 2024-09-29 RX ORDER — INSULIN GLARGINE 300 U/ML
48 INJECTION, SOLUTION SUBCUTANEOUS
Qty: 6 ML | Refills: 0 | Status: SHIPPED | OUTPATIENT
Start: 2024-09-29

## 2024-10-21 ENCOUNTER — OFFICE VISIT (OUTPATIENT)
Age: 69
End: 2024-10-21
Payer: MEDICARE

## 2024-10-21 VITALS
OXYGEN SATURATION: 97 % | RESPIRATION RATE: 18 BRPM | BODY MASS INDEX: 49.83 KG/M2 | SYSTOLIC BLOOD PRESSURE: 128 MMHG | TEMPERATURE: 97.9 F | HEIGHT: 62 IN | HEART RATE: 67 BPM | WEIGHT: 270.8 LBS | DIASTOLIC BLOOD PRESSURE: 80 MMHG

## 2024-10-21 DIAGNOSIS — F33.9 DEPRESSION, RECURRENT (HCC): ICD-10-CM

## 2024-10-21 DIAGNOSIS — R26.89 BALANCE PROBLEM: ICD-10-CM

## 2024-10-21 DIAGNOSIS — E78.2 MIXED HYPERLIPIDEMIA: ICD-10-CM

## 2024-10-21 DIAGNOSIS — M25.511 RIGHT SHOULDER PAIN, UNSPECIFIED CHRONICITY: ICD-10-CM

## 2024-10-21 DIAGNOSIS — F43.21 GRIEF: ICD-10-CM

## 2024-10-21 DIAGNOSIS — E11.42 TYPE 2 DIABETES MELLITUS WITH DIABETIC POLYNEUROPATHY, WITH LONG-TERM CURRENT USE OF INSULIN (HCC): ICD-10-CM

## 2024-10-21 DIAGNOSIS — M79.89 PALPABLE MASS OF SOFT TISSUE OF SHOULDER: ICD-10-CM

## 2024-10-21 DIAGNOSIS — Z00.00 MEDICARE ANNUAL WELLNESS VISIT, SUBSEQUENT: Primary | ICD-10-CM

## 2024-10-21 DIAGNOSIS — E66.01 OBESITY, MORBID (HCC): ICD-10-CM

## 2024-10-21 DIAGNOSIS — I10 PRIMARY HYPERTENSION: ICD-10-CM

## 2024-10-21 DIAGNOSIS — Z79.4 TYPE 2 DIABETES MELLITUS WITH DIABETIC POLYNEUROPATHY, WITH LONG-TERM CURRENT USE OF INSULIN (HCC): ICD-10-CM

## 2024-10-21 DIAGNOSIS — M47.816 LUMBAR FACET ARTHROPATHY: ICD-10-CM

## 2024-10-21 DIAGNOSIS — F41.9 ANXIETY AND DEPRESSION: ICD-10-CM

## 2024-10-21 DIAGNOSIS — R26.9 GAIT DIFFICULTY: ICD-10-CM

## 2024-10-21 DIAGNOSIS — F32.A ANXIETY AND DEPRESSION: ICD-10-CM

## 2024-10-21 PROCEDURE — G0439 PPPS, SUBSEQ VISIT: HCPCS

## 2024-10-21 PROCEDURE — 99214 OFFICE O/P EST MOD 30 MIN: CPT

## 2024-10-21 RX ORDER — BUSPIRONE HYDROCHLORIDE 5 MG/1
5 TABLET ORAL DAILY
Qty: 90 TABLET | Refills: 1 | Status: SHIPPED | OUTPATIENT
Start: 2024-10-21

## 2024-10-21 RX ORDER — GABAPENTIN 300 MG/1
600 CAPSULE ORAL DAILY
Qty: 180 CAPSULE | Refills: 1 | Status: SHIPPED | OUTPATIENT
Start: 2024-10-21

## 2024-10-21 RX ORDER — INSULIN GLARGINE 300 U/ML
50 INJECTION, SOLUTION SUBCUTANEOUS
Qty: 6 ML | Refills: 0 | Status: SHIPPED | OUTPATIENT
Start: 2024-10-21

## 2024-10-21 RX ORDER — GABAPENTIN 300 MG/1
600 CAPSULE ORAL DAILY
Qty: 60 CAPSULE | Refills: 2 | Status: SHIPPED | OUTPATIENT
Start: 2024-10-21 | End: 2024-10-21

## 2024-10-21 NOTE — ASSESSMENT & PLAN NOTE
Recommend the patient follow-up with a different counselor for grief counseling and offered to place a therapy referral, but she declines at this time.  Follow-up if symptoms worsen or fail to improve.

## 2024-10-21 NOTE — PROGRESS NOTES
Ambulatory Visit  Name: Hattie Patton      : 1955      MRN: [id number]  Encounter Provider: Yolanda Mario PA-C  Encounter Date: 10/21/2024   Encounter department: [facility]IMARY [facility] Plan  Medicare annual wellness visit, subsequent         Type 2 diabetes mellitus with diabetic polyneuropathy, with long-term current use of insulin (HCC)    Lab Results   Component Value Date    HGBA1C 7.9 (H) 2024   Recommend the patient repeat A1c in about 1 month since she last had it done in August.  Continue insulin 50 units at bedtime and glipizide 20 mg daily.  Continue to work on dietary modifications.  I will follow-up after results of blood work if there is any need for any medication adjustments.    Orders:    Hemoglobin A1C; Future    gabapentin (NEURONTIN) 300 mg capsule; Take 2 capsules (600 mg total) by mouth daily    insulin glargine (Toujeo Max SoloStar) 300 units/mL CONCENTRATED U-300 injection pen (2-unit dial); Inject 50 Units under the skin daily at bedtime    Primary hypertension  Controlled.  Continue hydrochlorothiazide 25 mg daily.       Anxiety and depression  Updated prescription of BuSpar from 10 mg daily to 5 mg daily since patient states she is doing well on that dose.  Continue BuSpar 5 mg daily and duloxetine 60 mg daily.  Follow-up if symptoms worsen or fail to improve.    Orders:    busPIRone (BUSPAR) 5 mg tablet; Take 1 tablet (5 mg total) by mouth in the morning    Depression, recurrent (HCC)  Stable.  Continue duloxetine 60 mg daily.  Recommended the patient get established with another therapist if she do not have a great connection with the first one.  Offered to place the therapy referral, but the patient declines at this time.  Follow-up if symptoms worsen or fail to improve.         Grief  Recommend the patient follow-up with a different counselor for grief counseling and offered to place a therapy referral, but she declines at this time.   Follow-up if symptoms worsen or fail to improve.       Mixed hyperlipidemia  Stable.  Continue Lipitor 40 mg daily       Obesity, morbid (HCC)           Balance problem  Recommend the patient follow-up with physical therapy for difficulty with her balance and for strength training.  Discussed safety precautions to help prevent falls and recommend she use assistive devices such as a cane, walker, or wheelchair.  Orders:    Ambulatory Referral to Physical Therapy; Future    Gait difficulty  See plan above  Orders:    Ambulatory Referral to Physical Therapy; Future    Palpable mass of soft tissue of shoulder  Patient states she was told this was a lipoma in the past.  Recommend following up with ultrasound for further characterization.  Will follow-up regarding results.  Will hold off on x-ray for now due to low suspicion for fracture or bony abnormality, but instructed the patient to follow-up if her pain worsens or fails to improve and we can consider x-ray imaging.  Continue Celebrex as prescribed for pain.  Orders:    US extremity soft tissue; Future    Right shoulder pain, unspecified chronicity  See plan above.        Lumbar facet arthropathy  Updated prescription of gabapentin to 600 mg daily from 600 mg 3 times daily since this is what the patient has been taking and she states that her pain is controlled.  Continue gabapentin 600 mg daily and follow-up if pain worsens.  Orders:    gabapentin (NEURONTIN) 300 mg capsule; Take 2 capsules (600 mg total) by mouth daily      Depression Screening and Follow-up Plan: Patient was screened for depression during today's encounter. They screened negative with a PHQ-9 score of 0.    Falls Plan of Care: referral to physical therapy. Recommended assistive device to help with gait and balance. Home safety education provided.     Urinary Incontinence Plan of Care: counseling topics discussed: limit alcohol, caffeine, spicy foods, and acidic foods and limiting fluid intake 3-4  "hours before bed.       Preventive health issues were discussed with patient, and age appropriate screening tests were ordered as noted in patient's After Visit Summary. Personalized health advice and appropriate referrals for health education or preventive services given if needed, as noted in patient's After Visit Summary.    History of Present Illness     Hattie presents with her  for annual physical. She has been trying to decrease her pill burdon recently. She decreased her dose of Buspar from two 5 mg pills in the morning to one. She was taking 5 mg in the morning and at night, but she would forget to take the nighttime dose, so it was recommended she take 10 mg in the morning, but when she did this she felt that her anxiety worsened. She feels her anxiety has been well controlled on 5 mg once daily. She also decreased her dose of gabapentin to 600 mg once daily instead of 600 mg 3 times daily and she feels that her pain is adequately controlled. Upon review of her med list, she says she is not taking omeprazole and she does not remember when she last took it. Her  believes it has been 8 months to a year since she's taken it.   Her dose of nighttime insulin was increased to 50 units at her last visit. She has been taking this dose as prescribed. She was on trulicity once weekly in the past but this was stopped because it was causing nausea and stomach upset.   She has been having some difficulty with balance recently, stating she feels \"wobbly.\" She had a fall recently when she was vacuuming her house. She was vacuuming with her right hand and she let the vacuum get too far infront of her and she fell forward and onto her right side.  She has been having some right sided shoulder pain since then.  She has a lump of the soft tissues of her right shoulder that has been there for years.  She was told in the past that this is most likely a lipoma.  She is having some pain in that region since her " fall and is having difficulty raising her right arm over her head.   She uses a cane all the time for assistance with walking.  She has an electric wheelchair that she uses outside of her home, but it does not fit in her home.  She states that she also has a walker that she can use outside of the home, but she does not have room inside of her home for a walker either.  She lost her mother about 5 months ago and is still grieving her loss.  She had talked to a counselor about it for some time, but she did not have a great connection with the counselor and does not feel that it was all that helpful.         Patient Care Team:  Linda Patton DO as PCP - General (Family Medicine)    Review of Systems   Constitutional:  Negative for chills and fever.   HENT:  Negative for congestion, ear pain and sore throat.    Eyes:  Negative for pain and visual disturbance.   Respiratory:  Negative for cough and shortness of breath.    Cardiovascular:  Negative for chest pain and palpitations.   Gastrointestinal:  Negative for abdominal pain, constipation, diarrhea and vomiting.   Genitourinary:  Negative for dysuria and hematuria.   Musculoskeletal:  Negative for arthralgias, back pain and myalgias.   Skin:  Negative for color change and rash.   Neurological:  Negative for dizziness, seizures, syncope and headaches.   All other systems reviewed and are negative.    Medical History Reviewed by provider this encounter:       Annual Wellness Visit Questionnaire   Hattie is here for her Subsequent Wellness visit.     Health Risk Assessment:   Patient rates overall health as fair. Patient feels that their physical health rating is slightly worse. Patient is satisfied with their life. Eyesight was rated as same. Hearing was rated as same. Patient feels that their emotional and mental health rating is slightly worse. Patients states they are never, rarely angry. Patient states they are sometimes unusually tired/fatigued. Pain  experienced in the last 7 days has been some. Patient's pain rating has been 4/10. Patient states that she has experienced no weight loss or gain in last 6 months.     Depression Screening:   PHQ-9 Score: 0      Fall Risk Screening:   In the past year, patient has experienced: history of falling in past year    Number of falls: 1  Injured during fall?: No    Feels unsteady when standing or walking?: Yes    Worried about falling?: Yes      Urinary Incontinence Screening:   Patient has leaked urine accidently in the last six months.     Home Safety:  Patient does not have trouble with stairs inside or outside of their home. Patient has working smoke alarms and has working carbon monoxide detector. Home safety hazards include: none.     Nutrition:   Current diet is Unhealthy and Frequent junk food.     Medications:   Patient is not currently taking any over-the-counter supplements. Patient is able to manage medications.     Activities of Daily Living (ADLs)/Instrumental Activities of Daily Living (IADLs):   Walk and transfer into and out of bed and chair?: Yes  Dress and groom yourself?: Yes    Bathe or shower yourself?: Yes    Feed yourself? Yes  Do your laundry/housekeeping?: Yes  Manage your money, pay your bills and track your expenses?: Yes  Make your own meals?: Yes    Do your own shopping?: Yes    Previous Hospitalizations:   Any hospitalizations or ED visits within the last 12 months?: No      Advance Care Planning:   Living will: No    Advanced directive: No      PREVENTIVE SCREENINGS      Cardiovascular Screening:    General: Screening Not Indicated and History Lipid Disorder      Diabetes Screening:     General: Screening Not Indicated and History Diabetes      Cervical Cancer Screening:    General: Screening Not Indicated      Lung Cancer Screening:     General: Screening Not Indicated      Hepatitis C Screening:    General: Screening Current    Screening, Brief Intervention, and Referral to Treatment  "(SBIRT)    Screening      Single Item Drug Screening:  How often have you used an illegal drug (including marijuana) or a prescription medication for non-medical reasons in the past year? never    Single Item Drug Screen Score: 0  Interpretation: Negative screen for possible drug use disorder    Social Determinants of Health     Financial Resource Strain: High Risk (10/16/2023)    Overall Financial Resource Strain (CARDIA)     Difficulty of Paying Living Expenses: Hard   Food Insecurity: No Food Insecurity (2/16/2024)    Hunger Vital Sign     Worried About Running Out of Food in the Last Year: Never true     Ran Out of Food in the Last Year: Never true   Transportation Needs: No Transportation Needs (2/16/2024)    PRAPARE - Transportation     Lack of Transportation (Medical): No     Lack of Transportation (Non-Medical): No   Housing Stability: Low Risk  (2/16/2024)    Housing Stability Vital Sign     Unable to Pay for Housing in the Last Year: No     Number of Times Moved in the Last Year: 1     Homeless in the Last Year: No   Utilities: Not At Risk (2/16/2024)    [facility]     Threatened with loss of utilities: No     No results found.    Objective     /80 (BP Location: Left arm, Patient Position: Sitting, Cuff Size: Large)   Pulse 67   Temp 97.9 °F (36.6 °C) (Tympanic)   Resp 18   Ht 5' 2\" (1.575 m)   Wt 123 kg (270 lb 12.8 oz)   SpO2 97%   BMI 49.53 kg/m²     Physical Exam  Vitals and nursing note reviewed.   Constitutional:       General: She is not in acute distress.     Appearance: She is well-developed.   HENT:      Head: Normocephalic and atraumatic.      Right Ear: Tympanic membrane normal.      Left Ear: Tympanic membrane normal.      Nose: Nose normal.      Mouth/Throat:      Mouth: Mucous membranes are moist.      Pharynx: Oropharynx is clear. No oropharyngeal exudate.   Eyes:      Extraocular Movements: Extraocular movements intact.      Conjunctiva/sclera: Conjunctivae normal. "   Cardiovascular:      Rate and Rhythm: Normal rate and regular rhythm.      Heart sounds: Normal heart sounds. No murmur heard.     No friction rub. No gallop.   Pulmonary:      Effort: Pulmonary effort is normal. No respiratory distress.      Breath sounds: Normal breath sounds. No wheezing, rhonchi or rales.   Abdominal:      Palpations: Abdomen is soft.      Tenderness: There is no abdominal tenderness.   Musculoskeletal:         General: No swelling.      Right shoulder: Deformity and tenderness present. Decreased range of motion (Decreased flexion/abduction).      Left shoulder: No swelling, deformity or tenderness. Normal range of motion.      Cervical back: Neck supple.      Comments: Lump of anterior right shoulder that is soft and mobile.  Approximately 2 inches in diameter.  Tender to palpation.  No overlying erythema or bruising.   Skin:     General: Skin is warm and dry.      Capillary Refill: Capillary refill takes less than 2 seconds.   Neurological:      General: No focal deficit present.      Mental Status: She is alert.   Psychiatric:         Mood and Affect: Mood normal.

## 2024-10-21 NOTE — ASSESSMENT & PLAN NOTE
Stable.  Continue duloxetine 60 mg daily.  Recommended the patient get established with another therapist if she do not have a great connection with the first one.  Offered to place the therapy referral, but the patient declines at this time.  Follow-up if symptoms worsen or fail to improve.

## 2024-10-21 NOTE — PATIENT INSTRUCTIONS
Please follow up with gastroenterology about omeprazole medication.     Medicare Preventive Visit Patient Instructions  Thank you for completing your Welcome to Medicare Visit or Medicare Annual Wellness Visit today. Your next wellness visit will be due in one year (10/22/2025).  The screening/preventive services that you may require over the next 5-10 years are detailed below. Some tests may not apply to you based off risk factors and/or age. Screening tests ordered at today's visit but not completed yet may show as past due. Also, please note that scanned in results may not display below.  Preventive Screenings:  Service Recommendations Previous Testing/Comments   Colorectal Cancer Screening  * Colonoscopy    * Fecal Occult Blood Test (FOBT)/Fecal Immunochemical Test (FIT)  * Fecal DNA/Cologuard Test  * Flexible Sigmoidoscopy Age: 45-75 years old   Colonoscopy: every 10 years (may be performed more frequently if at higher risk)  OR  FOBT/FIT: every 1 year  OR  Cologuard: every 3 years  OR  Sigmoidoscopy: every 5 years  Screening may be recommended earlier than age 45 if at higher risk for colorectal cancer. Also, an individualized decision between you and your healthcare provider will decide whether screening between the ages of 76-85 would be appropriate. Colonoscopy: Not on file  FOBT/FIT: Not on file  Cologuard: Not on file  Sigmoidoscopy: Not on file          Breast Cancer Screening Age: 40+ years old  Frequency: every 1-2 years  Not required if history of left and right mastectomy Mammogram: Not on file        Cervical Cancer Screening Between the ages of 21-29, pap smear recommended once every 3 years.   Between the ages of 30-65, can perform pap smear with HPV co-testing every 5 years.   Recommendations may differ for women with a history of total hysterectomy, cervical cancer, or abnormal pap smears in past. Pap Smear: Not on file    Screening Not Indicated   Hepatitis C Screening Once for adults born  between 1945 and 1965  More frequently in patients at high risk for Hepatitis C Hep C Antibody: 08/27/2022    Screening Current   Diabetes Screening 1-2 times per year if you're at risk for diabetes or have pre-diabetes Fasting glucose: 134 mg/dL (8/1/2024)  A1C: 7.9 % (8/1/2024)  Screening Not Indicated  History Diabetes   Cholesterol Screening Once every 5 years if you don't have a lipid disorder. May order more often based on risk factors. Lipid panel: 08/01/2024    Screening Not Indicated  History Lipid Disorder     Other Preventive Screenings Covered by Medicare:  Abdominal Aortic Aneurysm (AAA) Screening: covered once if your at risk. You're considered to be at risk if you have a family history of AAA.  Lung Cancer Screening: covers low dose CT scan once per year if you meet all of the following conditions: (1) Age 55-77; (2) No signs or symptoms of lung cancer; (3) Current smoker or have quit smoking within the last 15 years; (4) You have a tobacco smoking history of at least 20 pack years (packs per day multiplied by number of years you smoked); (5) You get a written order from a healthcare provider.  Glaucoma Screening: covered annually if you're considered high risk: (1) You have diabetes OR (2) Family history of glaucoma OR (3)  aged 50 and older OR (4)  American aged 65 and older  Osteoporosis Screening: covered every 2 years if you meet one of the following conditions: (1) You're estrogen deficient and at risk for osteoporosis based off medical history and other findings; (2) Have a vertebral abnormality; (3) On glucocorticoid therapy for more than 3 months; (4) Have primary hyperparathyroidism; (5) On osteoporosis medications and need to assess response to drug therapy.   Last bone density test (DXA Scan): 11/11/2022.  HIV Screening: covered annually if you're between the age of 15-65. Also covered annually if you are younger than 15 and older than 65 with risk factors for HIV  infection. For pregnant patients, it is covered up to 3 times per pregnancy.    Immunizations:  Immunization Recommendations   Influenza Vaccine Annual influenza vaccination during flu season is recommended for all persons aged >= 6 months who do not have contraindications   Pneumococcal Vaccine   * Pneumococcal conjugate vaccine = PCV13 (Prevnar 13), PCV15 (Vaxneuvance), PCV20 (Prevnar 20)  * Pneumococcal polysaccharide vaccine = PPSV23 (Pneumovax) Adults 19-63 yo with certain risk factors or if 65+ yo  If never received any pneumonia vaccine: recommend Prevnar 20 (PCV20)  Give PCV20 if previously received 1 dose of PCV13 or PPSV23   Hepatitis B Vaccine 3 dose series if at intermediate or high risk (ex: diabetes, end stage renal disease, liver disease)   Respiratory syncytial virus (RSV) Vaccine - COVERED BY MEDICARE PART D  * RSVPreF3 (Arexvy) CDC recommends that adults 60 years of age and older may receive a single dose of RSV vaccine using shared clinical decision-making (SCDM)   Tetanus (Td) Vaccine - COST NOT COVERED BY MEDICARE PART B Following completion of primary series, a booster dose should be given every 10 years to maintain immunity against tetanus. Td may also be given as tetanus wound prophylaxis.   Tdap Vaccine - COST NOT COVERED BY MEDICARE PART B Recommended at least once for all adults. For pregnant patients, recommended with each pregnancy.   Shingles Vaccine (Shingrix) - COST NOT COVERED BY MEDICARE PART B  2 shot series recommended in those 19 years and older who have or will have weakened immune systems or those 50 years and older     Health Maintenance Due:      Topic Date Due   • Breast Cancer Screening: Mammogram  Never done   • Colorectal Cancer Screening  Never done   • Hepatitis C Screening  Completed     Immunizations Due:      Topic Date Due   • Pneumococcal Vaccine: 65+ Years (1 of 2 - PCV) Never done   • Influenza Vaccine (1) 09/01/2024   • COVID-19 Vaccine (4 - 2023-24 season)  09/01/2024     Advance Directives   What are advance directives?  Advance directives are legal documents that state your wishes and plans for medical care. These plans are made ahead of time in case you lose your ability to make decisions for yourself. Advance directives can apply to any medical decision, such as the treatments you want, and if you want to donate organs.   What are the types of advance directives?  There are many types of advance directives, and each state has rules about how to use them. You may choose a combination of any of the following:  Living will:  This is a written record of the treatment you want. You can also choose which treatments you do not want, which to limit, and which to stop at a certain time. This includes surgery, medicine, IV fluid, and tube feedings.   Durable power of  for healthcare (DPAHC):  This is a written record that states who you want to make healthcare choices for you when you are unable to make them for yourself. This person, called a proxy, is usually a family member or a friend. You may choose more than 1 proxy.  Do not resuscitate (DNR) order:  A DNR order is used in case your heart stops beating or you stop breathing. It is a request not to have certain forms of treatment, such as CPR. A DNR order may be included in other types of advance directives.  Medical directive:  This covers the care that you want if you are in a coma, near death, or unable to make decisions for yourself. You can list the treatments you want for each condition. Treatment may include pain medicine, surgery, blood transfusions, dialysis, IV or tube feedings, and a ventilator (breathing machine).  Values history:  This document has questions about your views, beliefs, and how you feel and think about life. This information can help others choose the care that you would choose.  Why are advance directives important?  An advance directive helps you control your care. Although spoken  wishes may be used, it is better to have your wishes written down. Spoken wishes can be misunderstood, or not followed. Treatments may be given even if you do not want them. An advance directive may make it easier for your family to make difficult choices about your care.   Fall Prevention    Fall prevention  includes ways to make your home and other areas safer. It also includes ways you can move more carefully to prevent a fall. Health conditions that cause changes in your blood pressure, vision, or muscle strength and coordination may increase your risk for falls. Medicines may also increase your risk for falls if they make you dizzy, weak, or sleepy.   Fall prevention tips:   Stand or sit up slowly.    Use assistive devices as directed.    Wear shoes that fit well and have soles that .    Wear a personal alarm.    Stay active.    Manage your medical conditions.    Home Safety Tips:  Add items to prevent falls in the bathroom.    Keep paths clear.    Install bright lights in your home.    Keep items you use often on shelves within reach.    Paint or place reflective tape on the edges of your stairs.    Urinary Incontinence   Urinary incontinence (UI)  is when you lose control of your bladder. UI develops because your bladder cannot store or empty urine properly. The 3 most common types of UI are stress incontinence, urge incontinence, or both.  Medicines:   May be given to help strengthen your bladder control. Report any side effects of medication to your healthcare provider.  Do pelvic muscle exercises often:  Your pelvic muscles help you stop urinating. Squeeze these muscles tight for 5 seconds, then relax for 5 seconds. Gradually work up to squeezing for 10 seconds. Do 3 sets of 15 repetitions a day, or as directed. This will help strengthen your pelvic muscles and improve bladder control.  Train your bladder:  Go to the bathroom at set times, such as every 2 hours, even if you do not feel the urge to go.  You can also try to hold your urine when you feel the urge to go. For example, hold your urine for 5 minutes when you feel the urge to go. As that becomes easier, hold your urine for 10 minutes.   Self-care:   Keep a UI record.  Write down how often you leak urine and how much you leak. Make a note of what you were doing when you leaked urine.  Drink liquids as directed. You may need to limit the amount of liquid you drink to help control your urine leakage. Do not drink any liquid right before you go to bed. Limit or do not have drinks that contain caffeine or alcohol.   Prevent constipation.  Eat a variety of high-fiber foods. Good examples are high-fiber cereals, beans, vegetables, and whole-grain breads. Walking is the best way to trigger your intestines to have a bowel movement.  Exercise regularly and maintain a healthy weight.  Weight loss and exercise will decrease pressure on your bladder and help you control your leakage.   Use a catheter as directed  to help empty your bladder. A catheter is a tiny, plastic tube that is put into your bladder to drain your urine.   Go to behavior therapy as directed.  Behavior therapy may be used to help you learn to control your urge to urinate.    Weight Management   Why it is important to manage your weight:  Being overweight increases your risk of health conditions such as heart disease, high blood pressure, type 2 diabetes, and certain types of cancer. It can also increase your risk for osteoarthritis, sleep apnea, and other respiratory problems. Aim for a slow, steady weight loss. Even a small amount of weight loss can lower your risk of health problems.  How to lose weight safely:  A safe and healthy way to lose weight is to eat fewer calories and get regular exercise. You can lose up about 1 pound a week by decreasing the number of calories you eat by 500 calories each day.   Healthy meal plan for weight management:  A healthy meal plan includes a variety of foods,  contains fewer calories, and helps you stay healthy. A healthy meal plan includes the following:  Eat whole-grain foods more often.  A healthy meal plan should contain fiber. Fiber is the part of grains, fruits, and vegetables that is not broken down by your body. Whole-grain foods are healthy and provide extra fiber in your diet. Some examples of whole-grain foods are whole-wheat breads and pastas, oatmeal, brown rice, and bulgur.  Eat a variety of vegetables every day.  Include dark, leafy greens such as spinach, kale, jaye greens, and mustard greens. Eat yellow and orange vegetables such as carrots, sweet potatoes, and winter squash.   Eat a variety of fruits every day.  Choose fresh or canned fruit (canned in its own juice or light syrup) instead of juice. Fruit juice has very little or no fiber.  Eat low-fat dairy foods.  Drink fat-free (skim) milk or 1% milk. Eat fat-free yogurt and low-fat cottage cheese. Try low-fat cheeses such as mozzarella and other reduced-fat cheeses.  Choose meat and other protein foods that are low in fat.  Choose beans or other legumes such as split peas or lentils. Choose fish, skinless poultry (chicken or turkey), or lean cuts of red meat (beef or pork). Before you cook meat or poultry, cut off any visible fat.   Use less fat and oil.  Try baking foods instead of frying them. Add less fat, such as margarine, sour cream, regular salad dressing and mayonnaise to foods. Eat fewer high-fat foods. Some examples of high-fat foods include french fries, doughnuts, ice cream, and cakes.  Eat fewer sweets.  Limit foods and drinks that are high in sugar. This includes candy, cookies, regular soda, and sweetened drinks.  Exercise:  Exercise at least 30 minutes per day on most days of the week. Some examples of exercise include walking, biking, dancing, and swimming. You can also fit in more physical activity by taking the stairs instead of the elevator or parking farther away from stores.  Ask your healthcare provider about the best exercise plan for you.      © Copyright [employer] 2018 Information is for End User's use only and may not be sold, redistributed or otherwise used for commercial purposes. All illustrations and images included in CareNotes® are the copyrighted property of [employer] or [employer]      Patient Education     Type 2 diabetes   The Basics   Written by the doctors and editors at [facility]   What is type 2 diabetes? -- This is a disorder that disrupts the way the body uses sugar. It is sometimes called type 2 diabetes mellitus.  All of the cells in the body need sugar to work normally. Sugar gets into the cells with the help of a hormone called insulin. Insulin is made by the pancreas, an organ in the belly. If there is not enough insulin, or if cells in the body don't respond normally to insulin, sugar builds up in the blood. That is what happens to people with diabetes.  There are 2 different types of diabetes:   In type 1 diabetes, the pancreas makes little or no insulin.   In type 2 diabetes, the pancreas still makes some insulin, but the cells in the body stop responding normally. Eventually, the pancreas cannot make enough insulin to keep up.  Having excess body weight or obesity increases a person's risk of developing type 2 diabetes. But people without excess body weight can get diabetes, too.  What are the symptoms of type 2 diabetes? -- Type 2 diabetes usually causes no symptoms. When symptoms do happen, they include:   Needing to urinate often   Intense thirst   Blurry vision  Can diabetes lead to other health problems? -- Yes. Type 2 diabetes might not make you feel sick. But if it is not managed, it can lead to serious problems over time, such as:   Heart attacks   Strokes   Kidney disease   Vision problems (or even blindness)   Pain or loss of feeling in the hands and feet   Needing to have fingers, toes, or other body parts removed (amputated)  How  "do I know if I have type 2 diabetes? -- Your doctor or nurse can do a blood test. There are 2 tests that can be used for this. Both involve measuring the amount of sugar in your blood, called your \"blood sugar\" or \"blood glucose\":   One of the tests measures your blood sugar at the time the blood sample is taken. This test is done in the morning. You can't eat or drink anything except water for at least 8 hours before the test.   The other test shows what your average blood sugar has been for the past 2 to 3 months. This blood test is called \"hemoglobin A1C\" or just \"A1C.\" It can be checked at any time of the day, even if you have recently eaten.  How is type 2 diabetes treated? -- The goals of treatment are to manage your blood sugar and lower the risk of future problems that can happen in people with diabetes.  Treatment might include:   Lifestyle changes - This is an important part of managing diabetes. It includes eating healthy foods and getting plenty of physical activity.   Medicines - There are a few medicines that help lower blood sugar. Some people need to take pills that help the body make more insulin or that help insulin do its job. Others need insulin shots.  Depending on what medicines you take, you might need to check your blood sugar regularly at home. But not everyone with type 2 diabetes needs to do this. Your doctor or nurse will tell you if you should be checking your blood sugar, and when and how to do this.  Sometimes, people with type 2 diabetes also need medicines to help prevent problems caused by the disease. For instance, medicines used to lower blood pressure can reduce the chances of a heart attack or stroke.   General medical care - It's also important to take care of other areas of your health. This includes watching your blood pressure and cholesterol levels. You should also get certain vaccines, such as vaccines to protect against the flu and coronavirus disease 2019 (\"COVID-19\"). " Some people also need a vaccine to prevent pneumonia.  Can type 2 diabetes be prevented? -- Yes. To lower your chances of getting type 2 diabetes, the most important thing you can do is eat a healthy diet and get plenty of physical activity. This can help you lose weight if you are overweight. But eating well and being active are also good for your overall health. Even gentle activity, like walking, has benefits.  If you smoke, quitting can also lower your risk of type 2 diabetes. Quitting smoking can be difficult, but your doctor or nurse can help.  All topics are updated as new evidence becomes available and our peer review process is complete.  This topic retrieved from [employer] on: Apr 24, 2024.  Topic 84063 Version 23.0  Release: 32.3.2 - C32.113  © 2024 UpToDate, Inc. and/or its affiliates. All rights reserved.  Consumer Information Use and Disclaimer   Disclaimer: This generalized information is a limited summary of diagnosis, treatment, and/or medication information. It is not meant to be comprehensive and should be used as a tool to help the user understand and/or assess potential diagnostic and treatment options. It does NOT include all information about conditions, treatments, medications, side effects, or risks that may apply to a specific patient. It is not intended to be medical advice or a substitute for the medical advice, diagnosis, or treatment of a health care provider based on the health care provider's examination and assessment of a patient's specific and unique circumstances. Patients must speak with a health care provider for complete information about their health, medical questions, and treatment options, including any risks or benefits regarding use of medications. This information does not endorse any treatments or medications as safe, effective, or approved for treating a specific patient. UpToDate, Inc. and its affiliates disclaim any warranty or liability relating to this information  or the use thereof.The use of this information is governed by the Terms of Use, available at https://www.wolterskluwer.com/en/know/clinical-effectiveness-terms. 2024© UpToDate, Inc. and its affiliates and/or licensors. All rights reserved.  Copyright   © 2024 [employer] and/or its affiliates. All rights reserved.

## 2024-10-21 NOTE — ASSESSMENT & PLAN NOTE
Updated prescription of gabapentin to 600 mg daily from 600 mg 3 times daily since this is what the patient has been taking and she states that her pain is controlled.  Continue gabapentin 600 mg daily and follow-up if pain worsens.  Orders:    gabapentin (NEURONTIN) 300 mg capsule; Take 2 capsules (600 mg total) by mouth daily

## 2024-10-21 NOTE — ASSESSMENT & PLAN NOTE
Lab Results   Component Value Date    HGBA1C 7.9 (H) 08/01/2024   Recommend the patient repeat A1c in about 1 month since she last had it done in August.  Continue insulin 50 units at bedtime and glipizide 20 mg daily.  Continue to work on dietary modifications.  I will follow-up after results of blood work if there is any need for any medication adjustments.    Orders:    Hemoglobin A1C; Future    gabapentin (NEURONTIN) 300 mg capsule; Take 2 capsules (600 mg total) by mouth daily    insulin glargine (Toujeo Max SoloStar) 300 units/mL CONCENTRATED U-300 injection pen (2-unit dial); Inject 50 Units under the skin daily at bedtime

## 2024-10-26 DIAGNOSIS — E11.42 TYPE 2 DIABETES MELLITUS WITH DIABETIC POLYNEUROPATHY, WITH LONG-TERM CURRENT USE OF INSULIN (HCC): ICD-10-CM

## 2024-10-26 DIAGNOSIS — I10 PRIMARY HYPERTENSION: ICD-10-CM

## 2024-10-26 DIAGNOSIS — Z79.4 TYPE 2 DIABETES MELLITUS WITH DIABETIC POLYNEUROPATHY, WITH LONG-TERM CURRENT USE OF INSULIN (HCC): ICD-10-CM

## 2024-10-27 RX ORDER — DULOXETIN HYDROCHLORIDE 60 MG/1
60 CAPSULE, DELAYED RELEASE ORAL DAILY
Qty: 90 CAPSULE | Refills: 1 | Status: SHIPPED | OUTPATIENT
Start: 2024-10-27

## 2024-10-27 RX ORDER — HYDROCHLOROTHIAZIDE 25 MG/1
25 TABLET ORAL DAILY
Qty: 90 TABLET | Refills: 1 | Status: SHIPPED | OUTPATIENT
Start: 2024-10-27

## 2024-10-28 ENCOUNTER — EVALUATION (OUTPATIENT)
Age: 69
End: 2024-10-28
Payer: MEDICARE

## 2024-10-28 DIAGNOSIS — R26.89 BALANCE PROBLEM: Primary | ICD-10-CM

## 2024-10-28 DIAGNOSIS — R26.9 GAIT DIFFICULTY: ICD-10-CM

## 2024-10-28 PROCEDURE — 97530 THERAPEUTIC ACTIVITIES: CPT

## 2024-10-28 PROCEDURE — 97161 PT EVAL LOW COMPLEX 20 MIN: CPT

## 2024-10-28 RX ORDER — INSULIN GLARGINE 300 U/ML
50 INJECTION, SOLUTION SUBCUTANEOUS
Qty: 6 ML | Refills: 0 | Status: SHIPPED | OUTPATIENT
Start: 2024-10-28

## 2024-10-28 NOTE — PROGRESS NOTES
PT Evaluation          POC expires Unit limit Auth Expiration date PT/OT + Visit Limit? Co-Insurance   25 N/a pend BOMN Yes                               Visit/Unit Tracking  10/28                                                     Today's date: 10/28/2024  Patient name: Hattie Patton  : 1955  MRN: [id number]  Referring provider: Yolanda Mario PA-C  Dx:   Encounter Diagnosis     ICD-10-CM    1. Balance problem  R26.89 Ambulatory Referral to Physical Therapy      2. Gait difficulty  R26.9 Ambulatory Referral to Physical Therapy            Assessment  Assessment details: Patient is a 69 y.o. Female who presents to skilled outpatient PT with complaints of imbalance and reduced LE strength, with significant history of falls (2 falls in ) which has resulted in modification of activities, reduced confidence in balance, and decreased quality of life. She lives with her  who also has hx of falling; both unable to IND perform floor <> stand transfer. Edu on implications for neuro PT for both parties; patient verbalized understanding. Co-morbidities impacting level of care include HTN, T2DM, peripheral neuropathy, hx of cancer, LBP and BL knee arthritis, and standard fall precautions. Patient presents to physical therapy evaluation with Abnormal gait, Abnormal muscle tone, Abnormal or restricted ROM, Activity intolerance, Impaired balance, Impaired physical strength, Lacks appropriate HEP, Poor body mechanics, Pain with function, Safety issue, and Abnormal movement. Required seated rest after MA due to LBP. Patient with impaired sit <> stand transfers per 5xSTS score of 24.93 sec with 2 thigh assist which is below age-compared normative values. Patient values on TUG, MA, 10mWT, and mCTSIB indicate patient is at increased risk of falls, with patient demonstrating LOB/difficulty with SL conditions, narrow HENRY, compliant  surfaces, and eyes closed. Due to time constraints, will perform 6MWT, gait analysis, and floor <> stand transfer NV and provide basic HEP. Sig amount of time towards patient education, including PT implications and limitations, PT POC/goals, importance of increasing physical activity levels, importance of HEP compliance, floor <> stand transfer, PT and increasing independence, realistic progress/goals, and patient scores compared to normative values. Patient verbalized understanding/agreement and with no questions at end of session. Patient is a good candidate to receive skilled OPPT services to increase her LE strength, improve her balance, improve IND with floor <> stand transfer and stair negotiation, and inc her activity tolerance in order to maximize her level of function and safety.      Understanding of Dx/Px/POC: Good  Prognosis: Good    Patient verbalized understanding of POC.         Please contact me if you have any questions or recommendations. Thank you for the referral and the opportunity to share in Hattie Patton's care.        Plan  Plan details:   Patient would benefit from: Skilled PT  Planned modality interventions: Biofeedback, Cryotherapy, TENS, Thermotherapy  Planned therapy interventions: Abdominal trunk stabilization, ADL training, Balance, Balance/WB training, Breathing training, Body mechanics training, Coordination, Functional ROM exercises, Gait training, HEP, Joint Mobilization, Manual Therapy, Yuan taping, Motor coordination training, Neuromuscular re-education, Patient education, Postural training, Strengthening, Stretching, Therapeutic activities, Therapeutic exercises, Therapeutic training, Transfer training, Activity modification, Work reintegration  Frequency: 2-3x/wk for first 2 weeks, 1-2x/wk for remainder  Duration in weeks: 12 weeks  Plan of Care beginning date: 10/28/2024  Plan of Care expiration date: 3 months - 1/28/2025  Treatment plan discussed with: Patient and  Family       Goals  Short Term Goals (4 weeks):    - Patient will improve time on TUG by 2.9 seconds to facilitate improved safety in all ambulation  - Patient will be independent in basic HEP 2-3 weeks  - Patient will improve 5xSTS score by 2.3 seconds to promote improved LE functional strength needed for ADLs  - Patient will perform 6MWT to assess CV endurance  - Patient will perform DGI to assess dynamic balance      Long Term Goals (12 weeks):  - Patient will be independent in a comprehensive home exercise program  - Patient will improve scoring on DGI by 2.6 points to progress safety  - Patient will improve gait speed by 0.18 m/s to improve safety with community ambulation  - Patient will improve MA by 6 points in order to improve static balance and reduce risk for falls  - Patient will be able to demonstrate HT in gait without veering  - Patient will improve 6 Minute Walk Test score by 190 feet to promote improved cardiovascular endurance  - Patient will report 50% reduction in near falls in order to improve safety with functional tasks and reduce his risk for falls  - Patient will report going on walks at least 3 days per week to promote independence and improved cardiovascular endurance  - Patient will be able to ascend/descend stairs reciprocally with 1 UE assist to promote independence and safety with ADLs  - Patient will report 50% reduction in near falls when ambulating on uneven terrain      Cut off score    All date taken from APTA Neuro Section or Rehab Measures      Ma/57  MDC: 6 pts  Age Norms:  60-69: M - 55   F - 55  70-79: M - 54   F - 53  80-89: M - 53   F - 50 5xSTS: Ros et al 2010  MDC: 2.3 sec  Age Norms:  60-69: 11.4 sec  70-79: 12.6 sec  80-89: 14.8 sec   TUG  MDC: 4.14 sec  Cut off score:  >13.5 sec community dwelling adults  >32.2 frail elderly  <20 I for basic transfers  >30 dependent on transfers 10 Meter Walk Test: Romero et al 2011  MDC: 0.18 m/s  20-29: M - 1.35  m   F - 1.34 m  30-39: M - 1.43 m   F - 1.34 m  40-49: M - 1.43 m   F - 1.39 m  50-59: M - 1.43 m   F - 1.31 m  60-69: M - 1.34 m   F - 1.24 m  70-79: M - 1.26 m   F - 1.13 m  80-89: M - 0.97 m   F - 0.94 m    Household Ambulator < 0.4 m/s  Limited Community Ambulator 0.4 - 0.8 m/s  Community Ambulator 0.8 - 1.2 m/s  Safely cross the street > 1.2 m/s   FGA  MCID: 4 pts  Geriatrics/community < 22/30 fall risk  Geriatrics/community < 20/30 unexplained falls    DGI  MDC: vestibular - 4 pts  MDC: geriatric/community - 3 pts  Falls risk <19/24 mCTSIB  Norm: 20-60 yrs  Eyes open firm: norm sway 0.21-0.48  Eyes closed firm: norm sway 0.48-0.99  Eyes open foam: norm sway 0.38-0.71  Eyes closed foam: norm sway 0.70-2.22   6 Minute Walk Test  MDC: 190.98 ft  MCID: 164 ft    Age Norms  60-69: M - 1876 ft (571.80 m)  F - 1765 ft (537.98 m)  70-79: M - 1729 ft (527.00 m)  F - 1545 ft (470.92 m)  80-89: M - 1368 ft (416.97 m)  F - 1286 ft (391.97 m) ABC: Elsa & Dileep, 2003  <67% increased risk for falls   [place] [employer]  Evaluator Size:        Force (grams):          Hand/Dorsal Thresholds:        Plantar Thresholds:  - 1.65                       - 0.008                       - Normal                                 - Normal  - 2.36                       - 0.02                         - Normal                                 - Normal  - 2.44                       - 0.04                         - Normal                                 - Normal  - 2.83                       - 0.07                         - Normal                                 - Normal  - 3.22                       - 0.16                         - Diminished light touch          - Normal  - 3.61                       - 0.40                         - Diminished light touch          - Normal  - 3.84                       - 0.60                         - Diminished protective           - Diminished light touch  - 4.08                       -  "1.00                         - Diminished protective           - Diminished light touch  - 4.17                       - 1.40                         - Diminished protective           - Diminished light touch  - 4.31                       - 2.00                         - Diminished protective           - Diminished light touch  - 4.56                       - 4.00                         - Loss of protective sense      - Diminished protective  - 4.74                       - 6.00                         - Loss of protective sense      - Diminished protective  - 4.93                       - 8.00                         - Loss of protective sense      - Diminished protective  - 5.07                       - 10.0                         - Loss of protective sense     - Loss of protective sense  - 5.18                       - 15.0                         - Loss of protective sense     - Loss of protective sense  - 5.46                       - 26.0                         - Loss of protective sense     - Loss of protective sense  - 5.88                       - 60.0                         - Loss of protective sense     - Loss of protective sense  - 6.10                       - 100                          - Loss of protective sense     - Loss of protective sense  - 6.45                       - 180                          - Loss of protective sense     - Loss of protective sense  - 6.65                       - 300                          - Deep pressure sense only  - Deep pressure sense only         Subjective    History of Present Illness  - Mechanism of injury: \"I have no confidence in my balance. I lose my balance often, sometimes my legs just buckle (L>R), and I have a difficult time figuring out how high to lift my legs.\" Reports 2 falls in past year. Most recent fall was 10/19/24 while vacuuming resulting in R shoulder pain (scheduled US). Unable to perform floor <> stand transfer.  Neuropathy in B/L LE: \"my R foot is " "pins and needles right now.\"   - Primary AD: SPC for household ambulation; RW for uneven terrain, outside home  - Assist level at home: mod IND  - Decreased fine motor tasks: No    Patient goal:   Stand upright  Improve independence   Improve stair / curb negotiation    Pain  - Current pain ratin/10  - At best pain ratin/10  - At worst pain ratin/10  - Location: LBP / legs   - Aggravating factors: activity    Social Support  - Steps to enter house: 5 ZAID to porch + 1 large step with L HR (front)  Possible ramp   - Stairs in house: none  - Lives in: [place]  - Lives with:  and dog (11 mo puppy)    - Employment status: retired  (for 's [employer] services)  - Hand dominance: R-handed    Treatments  - Previous treatment: none  - Current treatment: PCP   - Diagnostic Testing: none      Objective     LE MMT  - R Hip Flexion: 3+/5   L Hip Flexion: 3+/5  - R Hip Extension: 3+/5  L Hip Extension: 3+/5  - R Hip Abduction: 4/5   L Hip Abduction: 4/5  - R Hip Adduction: 4-/5  L Hip Adduction: 4-/5  - R Knee Extension: 4/5  L Knee Extension: 4/5  - R Knee Flexion: 4-/5   L Knee Flexion: 4-/5  - R Ankle DF: 4-/5   L Ankle DF: 4-/5  - R Ankle PF: 4-/5   L Ankle PF: 4-/5    Sensation  - Light touch: impaired distal  - Deep pressure: impaired distal    Coordination  - Alternate Toe Taps: wfl    OT Screen  - Difficulty w/ clothing fasteners: No  - Difficulty w/ bathing: No  - Difficulty w/ dressing: No  - Difficulty w/ toileting: No    Gait  - Abnormalities: NV         Outcome Measures Initial Eval  10/28        5xSTS 24.93 sec with thigh assist        TUG  - Regular     23.51 sec w SPC        10 meter 0.51 m/s with SPC        MA         DGI defer        mCTSIB  - FTEO (firm)  - FTEC (firm)  - FTEO (foam)  - FTEC (foam)   30 sec  16.6 sec + LOB  Defer  Defer           6MWT NV        Floor <> stand transfer NV        ABC 28.75%               Precautions: HTN, T2DM, hx of cancer, standard fall " precautions    Past Medical History:   Diagnosis Date    Cancer (HCC)     Diabetes mellitus (HCC)     Diverticulitis of colon     Hypertension

## 2024-10-28 NOTE — LETTER
2024    Yolanda Mario PA-C  [address] [facility] [address]    Patient: Hattie Patton   YOB: 1955   Date of Visit: 10/28/2024     Encounter Diagnosis     ICD-10-CM    1. Balance problem  R26.89 Ambulatory Referral to Physical Therapy      2. Gait difficulty  R26.9 Ambulatory Referral to Physical Therapy          Dear Dr. Mario:    Thank you for your recent referral of Hattie Patton. Please review the attached evaluation summary from Hattie's recent visit.     Please verify that you agree with the plan of care by signing the attached order.     If you have any questions or concerns, please do not hesitate to call.     I sincerely appreciate the opportunity to share in the care of one of your patients and hope to have another opportunity to work with you in the near future.       Sincerely,    Margaux Saldivar, PT      Referring Provider:      I certify that I have read the below Plan of Care and certify the need for these services furnished under this plan of treatment while under my care.                    Yolanda Mario PA-C  [address] [facility] [address]  Via In Basket                                                                              PT Evaluation          POC expires Unit limit Auth Expiration date PT/OT + Visit Limit? Co-Insurance   25 N/a pend BOMN Yes                               Visit/Unit Tracking  10/28                                                     Today's date: 10/28/2024  Patient name: Hattie Patton  : 1955  MRN: [id number]  Referring provider: Yolanda Mario PA-C  Dx:   Encounter Diagnosis     ICD-10-CM    1. Balance problem  R26.89 Ambulatory Referral to Physical Therapy      2. Gait difficulty  R26.9 Ambulatory Referral to Physical Therapy            Assessment  Assessment details: Patient is a 69 y.o. Female who presents to skilled outpatient PT with complaints of imbalance and reduced LE  strength, with significant history of falls (2 falls in 2024) which has resulted in modification of activities, reduced confidence in balance, and decreased quality of life. She lives with her  who also has hx of falling; both unable to IND perform floor <> stand transfer. Edu on implications for neuro PT for both parties; patient verbalized understanding. Co-morbidities impacting level of care include HTN, T2DM, peripheral neuropathy, hx of cancer, LBP and BL knee arthritis, and standard fall precautions. Patient presents to physical therapy evaluation with Abnormal gait, Abnormal muscle tone, Abnormal or restricted ROM, Activity intolerance, Impaired balance, Impaired physical strength, Lacks appropriate HEP, Poor body mechanics, Pain with function, Safety issue, and Abnormal movement. Required seated rest after MA due to LBP. Patient with impaired sit <> stand transfers per 5xSTS score of 24.93 sec with 2 thigh assist which is below age-compared normative values. Patient values on TUG, MA, 10mWT, and mCTSIB indicate patient is at increased risk of falls, with patient demonstrating LOB/difficulty with SL conditions, narrow HENRY, compliant surfaces, and eyes closed. Due to time constraints, will perform 6MWT, gait analysis, and floor <> stand transfer NV and provide basic HEP. Sig amount of time towards patient education, including PT implications and limitations, PT POC/goals, importance of increasing physical activity levels, importance of HEP compliance, floor <> stand transfer, PT and increasing independence, realistic progress/goals, and patient scores compared to normative values. Patient verbalized understanding/agreement and with no questions at end of session. Patient is a good candidate to receive skilled OPPT services to increase her LE strength, improve her balance, improve IND with floor <> stand transfer and stair negotiation, and inc her activity tolerance in order to maximize her level of  function and safety.      Understanding of Dx/Px/POC: Good  Prognosis: Good    Patient verbalized understanding of POC.         Please contact me if you have any questions or recommendations. Thank you for the referral and the opportunity to share in Hattie Patton's care.        Plan  Plan details:   Patient would benefit from: Skilled PT  Planned modality interventions: Biofeedback, Cryotherapy, TENS, Thermotherapy  Planned therapy interventions: Abdominal trunk stabilization, ADL training, Balance, Balance/WB training, Breathing training, Body mechanics training, Coordination, Functional ROM exercises, Gait training, HEP, Joint Mobilization, Manual Therapy, Yuan taping, Motor coordination training, Neuromuscular re-education, Patient education, Postural training, Strengthening, Stretching, Therapeutic activities, Therapeutic exercises, Therapeutic training, Transfer training, Activity modification, Work reintegration  Frequency: 2-3x/wk for first 2 weeks, 1-2x/wk for remainder  Duration in weeks: 12 weeks  Plan of Care beginning date: 10/28/2024  Plan of Care expiration date: 3 months - 1/28/2025  Treatment plan discussed with: Patient and Family       Goals  Short Term Goals (4 weeks):    - Patient will improve time on TUG by 2.9 seconds to facilitate improved safety in all ambulation  - Patient will be independent in basic HEP 2-3 weeks  - Patient will improve 5xSTS score by 2.3 seconds to promote improved LE functional strength needed for ADLs  - Patient will perform 6MWT to assess CV endurance  - Patient will perform DGI to assess dynamic balance      Long Term Goals (12 weeks):  - Patient will be independent in a comprehensive home exercise program  - Patient will improve scoring on DGI by 2.6 points to progress safety  - Patient will improve gait speed by 0.18 m/s to improve safety with community ambulation  - Patient will improve MA by 6 points in order to improve static balance and reduce risk for  falls  - Patient will be able to demonstrate HT in gait without veering  - Patient will improve 6 Minute Walk Test score by 190 feet to promote improved cardiovascular endurance  - Patient will report 50% reduction in near falls in order to improve safety with functional tasks and reduce his risk for falls  - Patient will report going on walks at least 3 days per week to promote independence and improved cardiovascular endurance  - Patient will be able to ascend/descend stairs reciprocally with 1 UE assist to promote independence and safety with ADLs  - Patient will report 50% reduction in near falls when ambulating on uneven terrain      Cut off score    All date taken from APTA Neuro Section or Rehab Measures      [place]/[birth year]  MDC: 6 pts  Age Norms:  60-69: M - 55   F - 55  70-79: M - 54   F - 53  80-89: M - 53   F - 50 5xSTS: Ros et al 2010  MDC: 2.3 sec  Age Norms:  60-69: 11.4 sec  70-79: 12.6 sec  80-89: 14.8 sec   TUG  MDC: 4.14 sec  Cut off score:  >13.5 sec community dwelling adults  >32.2 frail elderly  <20 I for basic transfers  >30 dependent on transfers 10 Meter Walk Test: Linwood and Thai et al 2011  MDC: 0.18 m/s  20-29: M - 1.35 m   F - 1.34 m  30-39: M - 1.43 m   F - 1.34 m  40-49: M - 1.43 m   F - 1.39 m  50-59: M - 1.43 m   F - 1.31 m  60-69: M - 1.34 m   F - 1.24 m  70-79: M - 1.26 m   F - 1.13 m  80-89: M - 0.97 m   F - 0.94 m    Household Ambulator < 0.4 m/s  Limited Community Ambulator 0.4 - 0.8 m/s  Community Ambulator 0.8 - 1.2 m/s  Safely cross the street > 1.2 m/s   FGA  MCID: 4 pts  Geriatrics/community < 22/30 fall risk  Geriatrics/community < 20/30 unexplained falls    DGI  MDC: vestibular - 4 pts  MDC: geriatric/community - 3 pts  Falls risk <19/24 mCTSIB  Norm: 20-60 yrs  Eyes open firm: norm sway 0.21-0.48  Eyes closed firm: norm sway 0.48-0.99  Eyes open foam: norm sway 0.38-0.71  Eyes closed foam: norm sway 0.70-2.22   6 Minute Walk Test  MDC: 190.98 ft  MCID: 164 ft    Age  Norms  60-69: M - 1876 ft (571.80 m)  F - 1765 ft (537.98 m)  70-79: M - 1729 ft (527.00 m)  F - 1545 ft (470.92 m)  80-89: M - 1368 ft (416.97 m)  F - 1286 ft (391.97 m) ABC: Elsa & Dileep, 2003  <67% increased risk for falls   [place] Monofilaments  Evaluator Size:        Force (grams):          Hand/Dorsal Thresholds:        Plantar Thresholds:  - 1.65                       - 0.008                       - Normal                                 - Normal  - 2.36                       - 0.02                         - Normal                                 - Normal  - 2.44                       - 0.04                         - Normal                                 - Normal  - 2.83                       - 0.07                         - Normal                                 - Normal  - 3.22                       - 0.16                         - Diminished light touch          - Normal  - 3.61                       - 0.40                         - Diminished light touch          - Normal  - 3.84                       - 0.60                         - Diminished protective           - Diminished light touch  - 4.08                       - 1.00                         - Diminished protective           - Diminished light touch  - 4.17                       - 1.40                         - Diminished protective           - Diminished light touch  - 4.31                       - 2.00                         - Diminished protective           - Diminished light touch  - 4.56                       - 4.00                         - Loss of protective sense      - Diminished protective  - 4.74                       - 6.00                         - Loss of protective sense      - Diminished protective  - 4.93                       - 8.00                         - Loss of protective sense      - Diminished protective  - 5.07                       - 10.0                         - Loss of protective sense     - Loss of  "protective sense  - 5.18                       - 15.0                         - Loss of protective sense     - Loss of protective sense  - 5.46                       - 26.0                         - Loss of protective sense     - Loss of protective sense  - 5.88                       - 60.0                         - Loss of protective sense     - Loss of protective sense  - 6.10                       - 100                          - Loss of protective sense     - Loss of protective sense  - 6.45                       - 180                          - Loss of protective sense     - Loss of protective sense  - 6.65                       - 300                          - Deep pressure sense only  - Deep pressure sense only         Subjective    History of Present Illness  - Mechanism of injury: \"I have no confidence in my balance. I lose my balance often, sometimes my legs just buckle (L>R), and I have a difficult time figuring out how high to lift my legs.\" Reports 2 falls in past year. Most recent fall was 10/19/24 while vacuuming resulting in R shoulder pain (scheduled US). Unable to perform floor <> stand transfer.  Neuropathy in B/L LE: \"my R foot is pins and needles right now.\"   - Primary AD: SPC for household ambulation; RW for uneven terrain, outside home  - Assist level at home: mod IND  - Decreased fine motor tasks: No    Patient goal:   Stand upright  Improve independence   Improve stair / curb negotiation    Pain  - Current pain ratin/10  - At best pain ratin/10  - At worst pain ratin/10  - Location: LBP / legs   - Aggravating factors: activity    Social Support  - Steps to enter house: 5 ZAID to porch + 1 large step with L HR (front)  Possible ramp   - Stairs in house: none  - Lives in: ranc[place]  - Lives with:  and dog (11 mo puppy)    - 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)  - Hand dominance: R-handed    Treatments  - Previous treatment: none  - Current " treatment: PCP   - Diagnostic Testing: none      Objective     LE MMT  - R Hip Flexion: 3+/5   L Hip Flexion: 3+/5  - R Hip Extension: 3+/5  L Hip Extension: 3+/5  - R Hip Abduction: 4/5   L Hip Abduction: 4/5  - R Hip Adduction: 4-/5  L Hip Adduction: 4-/5  - R Knee Extension: 4/5  L Knee Extension: 4/5  - R Knee Flexion: 4-/5   L Knee Flexion: 4-/5  - R Ankle DF: 4-/5   L Ankle DF: 4-/5  - R Ankle PF: 4-/5   L Ankle PF: 4-/5    Sensation  - Light touch: impaired distal  - Deep pressure: impaired distal    Coordination  - Alternate Toe Taps: wfl    OT Screen  - Difficulty w/ clothing fasteners: No  - Difficulty w/ bathing: No  - Difficulty w/ dressing: No  - Difficulty w/ toileting: No    Gait  - Abnormalities: NV         Outcome Measures Initial Eval  10/28        5xSTS 24.93 sec with thigh assist        TUG  - Regular     23.51 sec w SPC        10 meter 0.51 m/s with SPC        MA 38/56        DGI defer        mCTSIB  - FTEO (firm)  - FTEC (firm)  - FTEO (foam)  - FTEC (foam)   30 sec  16.6 sec + LOB  Defer  Defer           6MWT NV        Floor <> stand transfer NV                        Precautions: HTN, T2DM, hx of cancer, standard fall precautions    Past Medical History:   Diagnosis Date   • Cancer (HCC)    • Diabetes mellitus (HCC)    • Diverticulitis of colon    • Hypertension

## 2024-10-29 ENCOUNTER — HOSPITAL ENCOUNTER (OUTPATIENT)
Dept: ULTRASOUND IMAGING | Facility: CLINIC | Age: 69
Discharge: HOME | End: 2024-10-29
Payer: MEDICARE

## 2024-10-29 DIAGNOSIS — M79.89 PALPABLE MASS OF SOFT TISSUE OF SHOULDER: ICD-10-CM

## 2024-10-29 PROCEDURE — 76882 US LMTD JT/FCL EVL NVASC XTR: CPT

## 2024-10-30 ENCOUNTER — OFFICE VISIT (OUTPATIENT)
Age: 69
End: 2024-10-30
Payer: MEDICARE

## 2024-10-30 DIAGNOSIS — R26.9 GAIT DIFFICULTY: ICD-10-CM

## 2024-10-30 DIAGNOSIS — R26.89 BALANCE PROBLEM: Primary | ICD-10-CM

## 2024-10-30 PROCEDURE — 97530 THERAPEUTIC ACTIVITIES: CPT

## 2024-10-30 NOTE — PROGRESS NOTES
"Daily Note     Today's date: 10/30/2024  Patient name: Hattie Patton  : 1955  MRN: [id number]  Referring provider: Yolanda Mario PA-C  Dx:   Encounter Diagnosis     ICD-10-CM    1. Balance problem  R26.89       2. Gait difficulty  R26.9                      Subjective: Patient arrives visibly upset. \"I don't think I can do therapy with everything going on.\" Verbalizes difficulty with grieving.      Objective: See treatment diary below    Access Code: [email or web address]  URL: [email or web address]  Date: 10/30/2024  Prepared by: Margaux Saldivar    Exercises  - Proper Sit to Stand Technique  - 1 x daily - 5 x weekly - 2 sets - 10 reps  - Standing March with Counter Support  - 1 x daily - 5 x weekly - 2 sets - 10 reps - 3 sec hold  - Standing Hip Extension with Counter Support  - 1 x daily - 5 x weekly - 2 sets - 10 reps - 3 sec hold  - Standing Hip Abduction with Counter Support  - 1 x daily - 5 x weekly - 2 sets - 10 reps - 3 sec hold  - Heel Raises with Counter Support  - 1 x daily - 5 x weekly - 2 sets - 10 reps - 3 sec hold  - Standing Knee Flexion with Counter Support  - 1 x daily - 5 x weekly - 2 sets - 10 reps - 3 sec hold  - Seated Trunk Rotation  - 1 x daily - 5 x weekly - 2 sets - 20 reps  - Seated Long Arc Quad  - 1 x daily - 5 x weekly - 2 sets - 10 reps - 3 second hold  - Seated Hip Abduction with Resistance  - 1 x daily - 5 x weekly - 2 sets - 10 reps - 3 second hold  - Seated ball squeeze  - 1 x daily - 5 x weekly - 2 sets - 10 reps - 3 sec hold  - Seated March  - 1 x daily - 5 x weekly - 2 sets - 10 reps - 3 second hold    Motivational interviewing  Methods to maximize compliance to HEP / inc physical activity  PT POC / med hold  Cognitive therapy via ST   Importance of diet  Holistic health    Assessment: Tolerated treatment fair. Interested in med hold to manage grief of niece and passing of her mother. Provided comprehensive HEP with verbal education and handout; understanding " verbalized. Provided extensive patient edu on compliance, competency, importance of inc physical activity levels, methods to maximize and encourage activity (to patient and ), positive reinforcement, and PT POC. Will place on med hold for 30 days with patient f/u. If no appt is made, will be D/C and require new script. Patient and  verbalized understanding.       Plan: 30-day med hold     POC expires Unit limit Auth Expiration date PT/OT + Visit Limit? Co-Insurance   1/28/25 N/a pend BOMN Yes                               Visit/Unit Tracking  10/28 10/30

## 2024-10-31 DIAGNOSIS — M25.511 RIGHT SHOULDER PAIN, UNSPECIFIED CHRONICITY: Primary | ICD-10-CM

## 2024-10-31 DIAGNOSIS — M79.89 PALPABLE MASS OF SOFT TISSUE OF SHOULDER: ICD-10-CM

## 2024-11-04 ENCOUNTER — TELEPHONE (OUTPATIENT)
Age: 69
End: 2024-11-04

## 2024-11-04 ENCOUNTER — APPOINTMENT (OUTPATIENT)
Age: 69
End: 2024-11-04
Payer: MEDICARE

## 2024-11-04 DIAGNOSIS — M25.511 RIGHT SHOULDER PAIN, UNSPECIFIED CHRONICITY: ICD-10-CM

## 2024-11-04 DIAGNOSIS — M79.89 PALPABLE MASS OF SOFT TISSUE OF SHOULDER: ICD-10-CM

## 2024-11-04 PROCEDURE — 73030 X-RAY EXAM OF SHOULDER: CPT

## 2024-11-04 NOTE — TELEPHONE ENCOUNTER
----- Message from Yolanda Mairo PA-C sent at 11/4/2024  9:57 AM EST -----  Ultrasound of the shoulder suggest the presence of 2 lipomas, or fatty tissue lumps, of the right shoulder.  Lipomas are usually not cause for concern and do not require medical treatment, but due to the patient's complaint of right shoulder pain, I would recommend following up with x-ray of the right shoulder and possibly physical therapy of the right shoulder depending on x-ray results.

## 2024-11-04 NOTE — TELEPHONE ENCOUNTER
----- Message from Yolanda Mario PA-C sent at 11/4/2024  9:57 AM EST -----  Ultrasound of the shoulder suggest the presence of 2 lipomas, or fatty tissue lumps, of the right shoulder.  Lipomas are usually not cause for concern and do not require medical treatment, but due to the patient's complaint of right shoulder pain, I would recommend following up with x-ray of the right shoulder and possibly physical therapy of the right shoulder depending on x-ray results.

## 2024-11-07 ENCOUNTER — TELEPHONE (OUTPATIENT)
Age: 69
End: 2024-11-07

## 2024-11-07 DIAGNOSIS — M25.511 RIGHT SHOULDER PAIN, UNSPECIFIED CHRONICITY: Primary | ICD-10-CM

## 2024-11-07 NOTE — TELEPHONE ENCOUNTER
----- Message from Yolanda Mario PA-C sent at 11/7/2024  7:48 AM EST -----  X-ray shows some moderate osteoarthritis in the shoulder, but no acute abnormalities.  I would recommend the patient follow-up with physical therapy for her shoulder pain.  I placed referral.

## 2024-12-09 DIAGNOSIS — J30.2 SEASONAL ALLERGIES: ICD-10-CM

## 2024-12-10 RX ORDER — FEXOFENADINE HCL 180 MG/1
180 TABLET ORAL DAILY
Qty: 90 TABLET | Refills: 1 | Status: SHIPPED | OUTPATIENT
Start: 2024-12-10

## 2024-12-12 DIAGNOSIS — Z79.4 TYPE 2 DIABETES MELLITUS WITH DIABETIC POLYNEUROPATHY, WITH LONG-TERM CURRENT USE OF INSULIN (HCC): ICD-10-CM

## 2024-12-12 DIAGNOSIS — E11.42 TYPE 2 DIABETES MELLITUS WITH DIABETIC POLYNEUROPATHY, WITH LONG-TERM CURRENT USE OF INSULIN (HCC): ICD-10-CM

## 2024-12-13 RX ORDER — INSULIN GLARGINE 300 U/ML
50 INJECTION, SOLUTION SUBCUTANEOUS
Qty: 12 ML | Refills: 1 | Status: SHIPPED | OUTPATIENT
Start: 2024-12-13

## 2024-12-27 DIAGNOSIS — Z79.4 TYPE 2 DIABETES MELLITUS WITH DIABETIC POLYNEUROPATHY, WITH LONG-TERM CURRENT USE OF INSULIN (HCC): ICD-10-CM

## 2024-12-27 DIAGNOSIS — E11.42 TYPE 2 DIABETES MELLITUS WITH DIABETIC POLYNEUROPATHY, WITH LONG-TERM CURRENT USE OF INSULIN (HCC): ICD-10-CM

## 2024-12-27 DIAGNOSIS — E78.2 MIXED HYPERLIPIDEMIA: ICD-10-CM

## 2024-12-27 RX ORDER — ATORVASTATIN CALCIUM 40 MG/1
40 TABLET, FILM COATED ORAL DAILY
Qty: 90 TABLET | Refills: 1 | Status: SHIPPED | OUTPATIENT
Start: 2024-12-27

## 2024-12-27 RX ORDER — GLIPIZIDE 10 MG/1
20 TABLET, FILM COATED, EXTENDED RELEASE ORAL DAILY
Qty: 180 TABLET | Refills: 1 | Status: SHIPPED | OUTPATIENT
Start: 2024-12-27

## 2025-01-13 ENCOUNTER — TELEMEDICINE (OUTPATIENT)
Dept: OTHER | Facility: HOSPITAL | Age: 70
End: 2025-01-13
Payer: MEDICARE

## 2025-01-13 VITALS — TEMPERATURE: 99.5 F

## 2025-01-13 DIAGNOSIS — R06.02 SOB (SHORTNESS OF BREATH): ICD-10-CM

## 2025-01-13 DIAGNOSIS — E86.0 DEHYDRATION: Primary | ICD-10-CM

## 2025-01-13 PROCEDURE — 99212 OFFICE O/P EST SF 10 MIN: CPT | Performed by: NURSE PRACTITIONER

## 2025-01-13 NOTE — PROGRESS NOTES
Virtual Regular Visit  Name: Hattie Patton      : 1955      MRN: [id number]  Encounter Provider: SALAS Smith  Encounter Date: 2025   Encounter department: VIRTUAL CARE       Verification of patient location:  Patient is located at Home in the following state in which I hold an active license [place] :  Assessment & Plan  Dehydration    Orders:    Transfer to other facility    SOB (shortness of breath)    Orders:    Transfer to other facility        Encounter provider SALAS Smith    The patient was identified by name and date of birth. Hattie Patton was informed that this is a telemedicine visit and that the visit is being conducted through the Epic Embedded platform. She agrees to proceed..  My office door was closed. No one else was in the room.  She acknowledged consent and understanding of privacy and security of the video platform. The patient has agreed to participate and understands they can discontinue the visit at any time.    Patient is aware this is a billable service.     History was obtained from: History obtained from: patient  History of Present Illness     This is a 69 year old male here today for video visit.  She states she started to feel unwell for the last several days.  She states she has not been able to eat for the last several days.  She states she has been drinking water but coughs it back up.   She states she is having some diarrhea.  She states the cough hurts her chest.  She states the last time she vomited with this diarrhea.  She states the last episode of diarrhea was this AM.  She states she tried Pepto but not helping.  She states she is unsure the last time she peed.  She has been up since 8 am she has not peed.  She states she has been having some chest pain after coughing but that goes away.  She states she is SOB when she goes to the bathroom.  She states she is feeling very weak.        Review of Systems   Constitutional:  Positive for activity  change, chills, fatigue and fever.   HENT:  Positive for congestion and rhinorrhea.    Respiratory:  Positive for cough and shortness of breath.    Gastrointestinal:  Positive for diarrhea, nausea and vomiting.       Objective   Temp 99.5 °F (37.5 °C)     Physical Exam  Constitutional:       General: She is not in acute distress.     Appearance: Normal appearance. She is ill-appearing. She is not toxic-appearing.   HENT:      Head: Normocephalic and atraumatic.      Nose: No congestion.   Pulmonary:      Effort: Pulmonary effort is normal. No respiratory distress.      Comments: Mild deep cough during visit but able to speak in full sentences.   Neurological:      Mental Status: She is alert and oriented to person, place, and time.   Psychiatric:         Mood and Affect: Mood normal.         Behavior: Behavior normal.         Thought Content: Thought content normal.         Judgment: Judgment normal.         Visit Time  Total Visit Duration:  minutes not including the time spent for establishing the audio/video connection.

## 2025-01-13 NOTE — PATIENT INSTRUCTIONS
Patient referred to ER to be seen.  I suspect she may be dehydrated.  He is also complaining of cough and SOB with exertion.  She was agreeable and  will take her to [place]./

## 2025-01-15 ENCOUNTER — RA CDI HCC (OUTPATIENT)
Dept: OTHER | Facility: HOSPITAL | Age: 70
End: 2025-01-15

## 2025-01-15 NOTE — PROGRESS NOTES
HCC coding opportunities          Chart Reviewed number of suggestions sent to Provider: 1  E11.65     Patients Insurance     Medicare Insurance: Medicare

## 2025-01-16 ENCOUNTER — APPOINTMENT (EMERGENCY)
Dept: RADIOLOGY | Facility: HOSPITAL | Age: 70
DRG: 189 | End: 2025-01-16
Payer: MEDICARE

## 2025-01-16 ENCOUNTER — HOSPITAL ENCOUNTER (INPATIENT)
Facility: HOSPITAL | Age: 70
LOS: 2 days | Discharge: HOME | DRG: 189 | End: 2025-01-19
Attending: EMERGENCY MEDICINE
Payer: MEDICARE

## 2025-01-16 DIAGNOSIS — R09.02 HYPOXIA: ICD-10-CM

## 2025-01-16 DIAGNOSIS — J20.5 RSV BRONCHITIS: Primary | ICD-10-CM

## 2025-01-16 PROBLEM — E87.6 HYPOKALEMIA: Status: ACTIVE | Noted: 2025-01-16

## 2025-01-16 LAB
2HR DELTA HS TROPONIN: -4 NG/L
4HR DELTA HS TROPONIN: -4 NG/L
ALBUMIN SERPL BCG-MCNC: 4.3 G/DL (ref 3.5–5)
ALP SERPL-CCNC: 82 U/L (ref 34–104)
ALT SERPL W P-5'-P-CCNC: 24 U/L (ref 7–52)
ANION GAP SERPL CALCULATED.3IONS-SCNC: 11 MMOL/L (ref 4–13)
AST SERPL W P-5'-P-CCNC: 25 U/L (ref 13–39)
ATRIAL RATE: 69 BPM
ATRIAL RATE: 69 BPM
BASOPHILS # BLD AUTO: 0.04 THOUSANDS/ΜL (ref 0–0.1)
BASOPHILS NFR BLD AUTO: 1 % (ref 0–1)
BILIRUB SERPL-MCNC: 0.68 MG/DL (ref 0.2–1)
BUN SERPL-MCNC: 18 MG/DL (ref 5–25)
CALCIUM SERPL-MCNC: 8.9 MG/DL (ref 8.4–10.2)
CARDIAC TROPONIN I PNL SERPL HS: 12 NG/L (ref ?–50)
CARDIAC TROPONIN I PNL SERPL HS: 12 NG/L (ref ?–50)
CARDIAC TROPONIN I PNL SERPL HS: 16 NG/L (ref ?–50)
CHLORIDE SERPL-SCNC: 103 MMOL/L (ref 96–108)
CO2 SERPL-SCNC: 26 MMOL/L (ref 21–32)
CREAT SERPL-MCNC: 0.78 MG/DL (ref 0.6–1.3)
EOSINOPHIL # BLD AUTO: 0.16 THOUSAND/ΜL (ref 0–0.61)
EOSINOPHIL NFR BLD AUTO: 2 % (ref 0–6)
ERYTHROCYTE [DISTWIDTH] IN BLOOD BY AUTOMATED COUNT: 14.7 % (ref 11.6–15.1)
EST. AVERAGE GLUCOSE BLD GHB EST-MCNC: 177 MG/DL
FLUAV RNA RESP QL NAA+PROBE: NEGATIVE
FLUBV RNA RESP QL NAA+PROBE: NEGATIVE
GFR SERPL CREATININE-BSD FRML MDRD: 78 ML/MIN/1.73SQ M
GLUCOSE SERPL-MCNC: 166 MG/DL (ref 65–140)
GLUCOSE SERPL-MCNC: 219 MG/DL (ref 65–140)
GLUCOSE SERPL-MCNC: 422 MG/DL (ref 65–140)
HBA1C MFR BLD: 7.8 %
HCT VFR BLD AUTO: 48.5 % (ref 34.8–46.1)
HGB BLD-MCNC: 15.5 G/DL (ref 11.5–15.4)
IMM GRANULOCYTES # BLD AUTO: 0.04 THOUSAND/UL (ref 0–0.2)
IMM GRANULOCYTES NFR BLD AUTO: 1 % (ref 0–2)
LYMPHOCYTES # BLD AUTO: 2.05 THOUSANDS/ΜL (ref 0.6–4.47)
LYMPHOCYTES NFR BLD AUTO: 24 % (ref 14–44)
MCH RBC QN AUTO: 27.6 PG (ref 26.8–34.3)
MCHC RBC AUTO-ENTMCNC: 32 G/DL (ref 31.4–37.4)
MCV RBC AUTO: 86 FL (ref 82–98)
MONOCYTES # BLD AUTO: 0.96 THOUSAND/ΜL (ref 0.17–1.22)
MONOCYTES NFR BLD AUTO: 11 % (ref 4–12)
NEUTROPHILS # BLD AUTO: 5.26 THOUSANDS/ΜL (ref 1.85–7.62)
NEUTS SEG NFR BLD AUTO: 61 % (ref 43–75)
NRBC BLD AUTO-RTO: 0 /100 WBCS
P AXIS: 83 DEGREES
P AXIS: 87 DEGREES
PLATELET # BLD AUTO: 207 THOUSANDS/UL (ref 149–390)
PLATELET # BLD AUTO: 245 THOUSANDS/UL (ref 149–390)
PMV BLD AUTO: 10.2 FL (ref 8.9–12.7)
PMV BLD AUTO: 9.8 FL (ref 8.9–12.7)
POTASSIUM SERPL-SCNC: 3 MMOL/L (ref 3.5–5.3)
PR INTERVAL: 140 MS
PR INTERVAL: 140 MS
PROT SERPL-MCNC: 7.5 G/DL (ref 6.4–8.4)
QRS AXIS: 57 DEGREES
QRS AXIS: 63 DEGREES
QRSD INTERVAL: 76 MS
QRSD INTERVAL: 78 MS
QT INTERVAL: 444 MS
QT INTERVAL: 618 MS
QTC INTERVAL: 475 MS
QTC INTERVAL: 662 MS
RBC # BLD AUTO: 5.62 MILLION/UL (ref 3.81–5.12)
RSV RNA RESP QL NAA+PROBE: POSITIVE
SARS-COV-2 RNA RESP QL NAA+PROBE: NEGATIVE
SODIUM SERPL-SCNC: 140 MMOL/L (ref 135–147)
T WAVE AXIS: 49 DEGREES
T WAVE AXIS: 72 DEGREES
VENTRICULAR RATE: 69 BPM
VENTRICULAR RATE: 69 BPM
WBC # BLD AUTO: 8.51 THOUSAND/UL (ref 4.31–10.16)

## 2025-01-16 PROCEDURE — 84484 ASSAY OF TROPONIN QUANT: CPT | Performed by: EMERGENCY MEDICINE

## 2025-01-16 PROCEDURE — 96375 TX/PRO/DX INJ NEW DRUG ADDON: CPT

## 2025-01-16 PROCEDURE — 96366 THER/PROPH/DIAG IV INF ADDON: CPT

## 2025-01-16 PROCEDURE — 93010 ELECTROCARDIOGRAM REPORT: CPT | Performed by: STUDENT IN AN ORGANIZED HEALTH CARE EDUCATION/TRAINING PROGRAM

## 2025-01-16 PROCEDURE — 36415 COLL VENOUS BLD VENIPUNCTURE: CPT | Performed by: EMERGENCY MEDICINE

## 2025-01-16 PROCEDURE — 99283 EMERGENCY DEPT VISIT LOW MDM: CPT

## 2025-01-16 PROCEDURE — 99223 1ST HOSP IP/OBS HIGH 75: CPT | Performed by: STUDENT IN AN ORGANIZED HEALTH CARE EDUCATION/TRAINING PROGRAM

## 2025-01-16 PROCEDURE — 71045 X-RAY EXAM CHEST 1 VIEW: CPT

## 2025-01-16 PROCEDURE — 93005 ELECTROCARDIOGRAM TRACING: CPT

## 2025-01-16 PROCEDURE — 80053 COMPREHEN METABOLIC PANEL: CPT | Performed by: EMERGENCY MEDICINE

## 2025-01-16 PROCEDURE — 83036 HEMOGLOBIN GLYCOSYLATED A1C: CPT | Performed by: STUDENT IN AN ORGANIZED HEALTH CARE EDUCATION/TRAINING PROGRAM

## 2025-01-16 PROCEDURE — 82948 REAGENT STRIP/BLOOD GLUCOSE: CPT

## 2025-01-16 PROCEDURE — 99285 EMERGENCY DEPT VISIT HI MDM: CPT | Performed by: EMERGENCY MEDICINE

## 2025-01-16 PROCEDURE — 94640 AIRWAY INHALATION TREATMENT: CPT

## 2025-01-16 PROCEDURE — 0241U HB NFCT DS VIR RESP RNA 4 TRGT: CPT | Performed by: EMERGENCY MEDICINE

## 2025-01-16 PROCEDURE — 96365 THER/PROPH/DIAG IV INF INIT: CPT

## 2025-01-16 PROCEDURE — 85025 COMPLETE CBC W/AUTO DIFF WBC: CPT | Performed by: EMERGENCY MEDICINE

## 2025-01-16 PROCEDURE — 85049 AUTOMATED PLATELET COUNT: CPT | Performed by: STUDENT IN AN ORGANIZED HEALTH CARE EDUCATION/TRAINING PROGRAM

## 2025-01-16 RX ORDER — ATORVASTATIN CALCIUM 40 MG/1
40 TABLET, FILM COATED ORAL DAILY
Status: DISCONTINUED | OUTPATIENT
Start: 2025-01-16 | End: 2025-01-19 | Stop reason: HOSPADM

## 2025-01-16 RX ORDER — GUAIFENESIN/DEXTROMETHORPHAN 100-10MG/5
10 SYRUP ORAL EVERY 4 HOURS PRN
Status: DISCONTINUED | OUTPATIENT
Start: 2025-01-16 | End: 2025-01-17

## 2025-01-16 RX ORDER — INSULIN LISPRO 100 [IU]/ML
1-5 INJECTION, SOLUTION INTRAVENOUS; SUBCUTANEOUS
Status: DISCONTINUED | OUTPATIENT
Start: 2025-01-16 | End: 2025-01-19 | Stop reason: HOSPADM

## 2025-01-16 RX ORDER — ENOXAPARIN SODIUM 100 MG/ML
40 INJECTION SUBCUTANEOUS 2 TIMES DAILY
Status: DISCONTINUED | OUTPATIENT
Start: 2025-01-16 | End: 2025-01-19 | Stop reason: HOSPADM

## 2025-01-16 RX ORDER — ALBUTEROL SULFATE 0.83 MG/ML
5 SOLUTION RESPIRATORY (INHALATION) ONCE
Status: COMPLETED | OUTPATIENT
Start: 2025-01-16 | End: 2025-01-16

## 2025-01-16 RX ORDER — SODIUM CHLORIDE 9 MG/ML
3 INJECTION INTRAVENOUS
Status: DISCONTINUED | OUTPATIENT
Start: 2025-01-16 | End: 2025-01-19 | Stop reason: HOSPADM

## 2025-01-16 RX ORDER — DULOXETIN HYDROCHLORIDE 60 MG/1
60 CAPSULE, DELAYED RELEASE ORAL DAILY
Status: DISCONTINUED | OUTPATIENT
Start: 2025-01-16 | End: 2025-01-19 | Stop reason: HOSPADM

## 2025-01-16 RX ORDER — HYDROCHLOROTHIAZIDE 25 MG/1
25 TABLET ORAL DAILY
Status: DISCONTINUED | OUTPATIENT
Start: 2025-01-16 | End: 2025-01-19 | Stop reason: HOSPADM

## 2025-01-16 RX ORDER — POTASSIUM CHLORIDE 1500 MG/1
40 TABLET, EXTENDED RELEASE ORAL ONCE
Status: COMPLETED | OUTPATIENT
Start: 2025-01-16 | End: 2025-01-16

## 2025-01-16 RX ORDER — ALBUTEROL SULFATE 90 UG/1
2 INHALANT RESPIRATORY (INHALATION) EVERY 4 HOURS PRN
Status: DISCONTINUED | OUTPATIENT
Start: 2025-01-16 | End: 2025-01-19 | Stop reason: HOSPADM

## 2025-01-16 RX ORDER — BENZONATATE 100 MG/1
100 CAPSULE ORAL 3 TIMES DAILY
Status: DISCONTINUED | OUTPATIENT
Start: 2025-01-16 | End: 2025-01-17

## 2025-01-16 RX ORDER — GABAPENTIN 300 MG/1
600 CAPSULE ORAL DAILY
Status: DISCONTINUED | OUTPATIENT
Start: 2025-01-16 | End: 2025-01-19 | Stop reason: HOSPADM

## 2025-01-16 RX ORDER — DIAZEPAM 5 MG/1
5 TABLET ORAL EVERY 6 HOURS PRN
Status: DISCONTINUED | OUTPATIENT
Start: 2025-01-16 | End: 2025-01-19 | Stop reason: HOSPADM

## 2025-01-16 RX ORDER — INSULIN GLARGINE 100 [IU]/ML
50 INJECTION, SOLUTION SUBCUTANEOUS
Status: DISCONTINUED | OUTPATIENT
Start: 2025-01-16 | End: 2025-01-18

## 2025-01-16 RX ORDER — METHYLPREDNISOLONE SODIUM SUCCINATE 40 MG/ML
40 INJECTION, POWDER, LYOPHILIZED, FOR SOLUTION INTRAMUSCULAR; INTRAVENOUS EVERY 12 HOURS SCHEDULED
Status: DISCONTINUED | OUTPATIENT
Start: 2025-01-16 | End: 2025-01-18

## 2025-01-16 RX ORDER — METHYLPREDNISOLONE SODIUM SUCCINATE 125 MG/2ML
125 INJECTION, POWDER, LYOPHILIZED, FOR SOLUTION INTRAMUSCULAR; INTRAVENOUS ONCE
Status: COMPLETED | OUTPATIENT
Start: 2025-01-16 | End: 2025-01-16

## 2025-01-16 RX ORDER — BUSPIRONE HYDROCHLORIDE 5 MG/1
5 TABLET ORAL DAILY
Status: DISCONTINUED | OUTPATIENT
Start: 2025-01-16 | End: 2025-01-19 | Stop reason: HOSPADM

## 2025-01-16 RX ORDER — MAGNESIUM SULFATE HEPTAHYDRATE 40 MG/ML
2 INJECTION, SOLUTION INTRAVENOUS ONCE
Status: COMPLETED | OUTPATIENT
Start: 2025-01-16 | End: 2025-01-16

## 2025-01-16 RX ADMIN — METHYLPREDNISOLONE SODIUM SUCCINATE 40 MG: 40 INJECTION, POWDER, FOR SOLUTION INTRAMUSCULAR; INTRAVENOUS at 19:41

## 2025-01-16 RX ADMIN — INSULIN LISPRO 2 UNITS: 100 INJECTION, SOLUTION INTRAVENOUS; SUBCUTANEOUS at 14:34

## 2025-01-16 RX ADMIN — ALBUTEROL SULFATE 5 MG: 2.5 SOLUTION RESPIRATORY (INHALATION) at 10:03

## 2025-01-16 RX ADMIN — ATORVASTATIN CALCIUM 40 MG: 40 TABLET, FILM COATED ORAL at 14:20

## 2025-01-16 RX ADMIN — BUSPIRONE HYDROCHLORIDE 5 MG: 5 TABLET ORAL at 14:21

## 2025-01-16 RX ADMIN — BENZONATATE 100 MG: 100 CAPSULE ORAL at 22:00

## 2025-01-16 RX ADMIN — INSULIN GLARGINE 50 UNITS: 100 INJECTION, SOLUTION SUBCUTANEOUS at 22:00

## 2025-01-16 RX ADMIN — DULOXETINE HYDROCHLORIDE 60 MG: 60 CAPSULE, DELAYED RELEASE ORAL at 14:21

## 2025-01-16 RX ADMIN — IPRATROPIUM BROMIDE 0.5 MG: 0.5 SOLUTION RESPIRATORY (INHALATION) at 10:03

## 2025-01-16 RX ADMIN — HYDROCHLOROTHIAZIDE 25 MG: 25 TABLET ORAL at 14:20

## 2025-01-16 RX ADMIN — GABAPENTIN 600 MG: 300 CAPSULE ORAL at 14:20

## 2025-01-16 RX ADMIN — BENZONATATE 100 MG: 100 CAPSULE ORAL at 16:39

## 2025-01-16 RX ADMIN — METHYLPREDNISOLONE SODIUM SUCCINATE 125 MG: 125 INJECTION, POWDER, FOR SOLUTION INTRAMUSCULAR; INTRAVENOUS at 10:08

## 2025-01-16 RX ADMIN — POTASSIUM CHLORIDE 40 MEQ: 1500 TABLET, EXTENDED RELEASE ORAL at 12:33

## 2025-01-16 RX ADMIN — MAGNESIUM SULFATE HEPTAHYDRATE 2 G: 40 INJECTION, SOLUTION INTRAVENOUS at 10:08

## 2025-01-16 NOTE — ASSESSMENT & PLAN NOTE
"Lab Results   Component Value Date    HGBA1C 7.9 (H) 08/01/2024       No results for input(s): \"POCGLU\" in the last 72 hours.    Blood Sugar Average: Last 72 hrs:  Hold oral hypoglycemic  Insulin sliding scale    "

## 2025-01-16 NOTE — H&P
"H&P - Hospitalist   Name: Hattie Patton 69 y.o. female I MRN: [id number]  Unit/Bed#: FT 04 I Date of Admission: 1/16/2025   Date of Service: 1/16/2025 I Hospital Day: 0     Assessment & Plan  Acute hypoxic respiratory failure (HCC)  RSV bronchitis  Chest x-ray did not show any infiltrate/pleural effusion  IV methylprednisone 40 mg every 12 hourly  Incentive spirometry  DuoNebs every 6 hourly  Albuterol as needed  Antitussives  Ambulatory pulse ox  Mixed hyperlipidemia  Continue with statins  Primary hypertension  Continue with hydrochlorothiazide 25 mg  Type 2 diabetes mellitus with diabetic polyneuropathy, with long-term current use of insulin (HCC)  Lab Results   Component Value Date    HGBA1C 7.9 (H) 08/01/2024       No results for input(s): \"POCGLU\" in the last 72 hours.    Blood Sugar Average: Last 72 hrs:  Hold oral hypoglycemic  Insulin sliding scale    RSV bronchitis  Symptomatic management  Hypokalemia  P.o. KCl 40 mg x 1 given by ER  Will give another 80 mEq per oral      VTE Pharmacologic Prophylaxis:   Moderate Risk (Score 3-4) - Pharmacological DVT Prophylaxis Ordered: enoxaparin (Lovenox).  Code Status: Level 1 - Full Code   Discussion with family: Updated  () at bedside.    Anticipated Length of Stay: Patient will be admitted on an observation basis with an anticipated length of stay of less than 2 midnights secondary to acute hypoxemic respiratory failure secondary to RSV bronchitis.    History of Present Illness   Chief Complaint:     Hattie Patton is a 69 y.o. female with a PMH of hypertension, dyslipidemia, type 2 diabetes mellitus who presents with progressive shortness of breath and intermittent cough with scanty sputum.  Her  informed that he had mild flulike illness before she had the symptoms.      She reported she had some low-grade fever with chills but no nausea, vomiting, dysuric symptoms    Review of Systems all are negative except mentioned " above    Historical Information   Past Medical History:   Diagnosis Date    Cancer (HCC)     Diabetes mellitus (HCC)     Diverticulitis of colon     Hypertension      Past Surgical History:   Procedure Laterality Date    CHOLECYSTECTOMY      COLONOSCOPY      GALLBLADDER SURGERY      HYSTERECTOMY      INTRACAPSULAR CATARACT EXTRACTION Bilateral     UMBILICAL HERNIA REPAIR       Social History     Tobacco Use    Smoking status: Former     Current packs/day: 0.00     Types: Cigarettes     Quit date: 2000     Years since quittin.1    Smokeless tobacco: Former   Vaping Use    Vaping status: Never Used   Substance and Sexual Activity    Alcohol use: Not Currently     Comment: socially - rare    Drug use: Yes     Frequency: 7.0 times per week     Types: Marijuana     Comment: every day medical marijuana, oil or smoke    Sexual activity: Not Currently     Partners: Male     Birth control/protection: None     E-Cigarette/Vaping    E-Cigarette Use Never User      E-Cigarette/Vaping Substances    Nicotine No     THC No     CBD No     Flavoring No     Other No     Unknown No      Family history non-contributory  Social History:  Marital Status: /Civil Union   Patient Pre-hospital Living Situation: Home  Patient Pre-hospital Level of Mobility: walks  Patient Pre-hospital Diet Restrictions: none    Meds/Allergies   I have reviewed home medications using recent Epic encounter.  Prior to Admission medications    Medication Sig Start Date End Date Taking? Authorizing Provider   albuterol (PROVENTIL HFA,VENTOLIN HFA) 90 mcg/act inhaler TAKE 2 PUFFS BY MOUTH EVERY 4 HOURS AS NEEDED FOR WHEEZE 24   Linda Patton DO   atorvastatin (LIPITOR) 40 mg tablet Take 1 tablet (40 mg total) by mouth daily 24   Linda Patton DO   busPIRone (BUSPAR) 5 mg tablet Take 1 tablet (5 mg total) by mouth in the morning 10/21/24   Yolanda Mario PA-C   celecoxib (CeleBREX) 100 mg capsule Take 1 capsule (100  mg total) by mouth 2 (two) times a day 7/17/24   Linda Patton DO   diazepam (VALIUM) 5 mg tablet Take 1 tablet (5 mg total) by mouth every 6 (six) hours as needed for anxiety 7/23/24   Jerry Salgado DO   DULoxetine (CYMBALTA) 60 mg delayed release capsule Take 1 capsule (60 mg total) by mouth daily 10/27/24   Linda Patton DO   fexofenadine (ALLEGRA) 180 MG tablet TAKE 1 TABLET BY MOUTH EVERY DAY 12/10/24   Yolanda Mario PA-C   gabapentin (NEURONTIN) 300 mg capsule Take 2 capsules (600 mg total) by mouth daily 10/21/24   Yolanda Mario PA-C   glipiZIDE (GLUCOTROL XL) 10 mg 24 hr tablet Take 2 tablets (20 mg total) by mouth daily 12/27/24   Linda Patton DO   hydroCHLOROthiazide 25 mg tablet Take 1 tablet (25 mg total) by mouth daily 10/27/24   Linda Patton DO   insulin glargine (Toujeo Max SoloStar) 300 units/mL CONCENTRATED U-300 injection pen (2-unit dial) Inject 50 Units under the skin daily at bedtime 12/13/24   Yolanda Mario PA-C   omeprazole (PriLOSEC) 40 MG capsule TAKE 1 CAPSULE BY MOUTH DAILY  Patient not taking: Reported on 10/21/2024 5/7/24   Astrid Lockhart PA-C     Allergies   Allergen Reactions    Penicillins Throat Swelling and Tongue Swelling    Pollen Extract Abdominal Pain     RUNNY NOSE, EYES WATERY, NASAL DRIP ETC       Objective :  Temp:  [98.8 °F (37.1 °C)] 98.8 °F (37.1 °C)  HR:  [60-73] 70  BP: (142-172)/(78-99) 169/78  Resp:  [20-24] 20  SpO2:  [88 %-98 %] 92 %  O2 Device: Nasal cannula  Nasal Cannula O2 Flow Rate (L/min):  [2 L/min] 2 L/min    Physical Exam   Constitutional: No acute distress  HEENT: Pallor or icterus  CVS: S1 plus S2  Respiratory: Bilateral expiratory wheeze  Gastroenterology: Soft nontender without any palpable mass  Skin: No bruises or ecchymosis  Neurology: No focal logical deficit     Lines/Drains:            Lab Results: I have reviewed the following results:  Results from last 7 days   Lab Units 01/16/25  1002    WBC Thousand/uL 8.51   HEMOGLOBIN g/dL 15.5*   HEMATOCRIT % 48.5*   PLATELETS Thousands/uL 245   SEGS PCT % 61   LYMPHO PCT % 24   MONO PCT % 11   EOS PCT % 2     Results from last 7 days   Lab Units 01/16/25  1009   SODIUM mmol/L 140   POTASSIUM mmol/L 3.0*   CHLORIDE mmol/L 103   CO2 mmol/L 26   BUN mg/dL 18   CREATININE mg/dL 0.78   ANION GAP mmol/L 11   CALCIUM mg/dL 8.9   ALBUMIN g/dL 4.3   TOTAL BILIRUBIN mg/dL 0.68   ALK PHOS U/L 82   ALT U/L 24   AST U/L 25   GLUCOSE RANDOM mg/dL 166*             Lab Results   Component Value Date    HGBA1C 7.9 (H) 08/01/2024    HGBA1C 7.5 (H) 04/24/2024    HGBA1C 8.2 (A) 02/23/2024           Imaging Results Review: I reviewed radiology reports from this admission including: chest xray.  Other Study Results Review: Other studies reviewed include: CBC and BMP    Administrative Statements   I have spent a total time of 89 minutes in caring for this patient on the day of the visit/encounter including Impressions, Counseling / Coordination of care, Documenting in the medical record, Reviewing / ordering tests, medicine, procedures  , Obtaining or reviewing history  , and Communicating with other healthcare professionals .    ** Please Note: This note has been constructed using a voice recognition system. **

## 2025-01-16 NOTE — ASSESSMENT & PLAN NOTE
RSV bronchitis  Chest x-ray did not show any infiltrate/pleural effusion  IV methylprednisone 40 mg every 12 hourly  Incentive spirometry  DuoNebs every 6 hourly  Albuterol as needed  Antitussives  Ambulatory pulse ox

## 2025-01-16 NOTE — ED PROVIDER NOTES
Time reflects when diagnosis was documented in both MDM as applicable and the Disposition within this note       Time User Action Codes Description Comment    1/16/2025 12:31 PM Alla Rick Add [J20.5] RSV bronchitis     1/16/2025 12:31 PM Alla Rick Add [R09.02] Hypoxia           ED Disposition       ED Disposition   Admit    Condition   Stable    Date/Time   Thu Jan 16, 2025 12:31 PM    Comment   Case was discussed with MICHELLE and the patient's admission status was agreed to be Admission Status: observation status to the service of Dr. Joya .               Assessment & Plan       Medical Decision Making  68 y/o female with cough, congestion, and sob- will get cardiac workup, covid/ flu/rsv, and cxr. Will give breathing tx, steroids, mag, and reassess.     Amount and/or Complexity of Data Reviewed  Labs: ordered. Decision-making details documented in ED Course.  Radiology: ordered.    Risk  Prescription drug management.  Decision regarding hospitalization.        ED Course as of 01/16/25 1539   u Jan 16, 2025   1104 RSV PCR(!): Positive       Medications   sodium chloride (PF) 0.9 % injection 3 mL (has no administration in time range)   albuterol (PROVENTIL HFA,VENTOLIN HFA) inhaler 2 puff (has no administration in time range)   atorvastatin (LIPITOR) tablet 40 mg (40 mg Oral Given 1/16/25 1420)   busPIRone (BUSPAR) tablet 5 mg (5 mg Oral Given 1/16/25 1421)   diazepam (VALIUM) tablet 5 mg (has no administration in time range)   DULoxetine (CYMBALTA) delayed release capsule 60 mg (60 mg Oral Given 1/16/25 1421)   gabapentin (NEURONTIN) capsule 600 mg (600 mg Oral Given 1/16/25 1420)   hydroCHLOROthiazide tablet 25 mg (25 mg Oral Given 1/16/25 1420)   insulin glargine (LANTUS) subcutaneous injection 50 Units 0.5 mL (has no administration in time range)   enoxaparin (LOVENOX) subcutaneous injection 40 mg (40 mg Subcutaneous Not Given 1/16/25 1415)   insulin lispro (HumALOG/ADMELOG) 100 units/mL  subcutaneous injection 1-5 Units (2 Units Subcutaneous Given 25 1434)   methylPREDNISolone sodium succinate (Solu-MEDROL) injection 40 mg (has no administration in time range)   benzonatate (TESSALON PERLES) capsule 100 mg (has no administration in time range)   dextromethorphan-guaiFENesin (ROBITUSSIN DM) oral syrup 10 mL (has no administration in time range)   albuterol inhalation solution 5 mg (5 mg Nebulization Given 25 1003)   ipratropium (ATROVENT) 0.02 % inhalation solution 0.5 mg (0.5 mg Nebulization Given 25 1003)   methylPREDNISolone sodium succinate (Solu-MEDROL) injection 125 mg (125 mg Intravenous Given 25 1008)   magnesium sulfate 2 g/50 mL IVPB (premix) 2 g (0 g Intravenous Stopped 25 1209)   potassium chloride (Klor-Con M20) CR tablet 40 mEq (40 mEq Oral Given 25 1233)       ED Risk Strat Scores                                              History of Present Illness       Chief Complaint   Patient presents with    Flu Symptoms     Pt reports x 1 week she's had a non stop cough, congestion, headache, vomiting, and diarrhea.        Past Medical History:   Diagnosis Date    Cancer (HCC)     Diabetes mellitus (HCC)     Diverticulitis of colon     Hypertension       Past Surgical History:   Procedure Laterality Date    CHOLECYSTECTOMY      COLONOSCOPY      GALLBLADDER SURGERY      HYSTERECTOMY      INTRACAPSULAR CATARACT EXTRACTION Bilateral     UMBILICAL HERNIA REPAIR        Family History   Problem Relation Age of Onset    Heart disease Mother     Cancer Father     Hypertension Father     Cancer Sister     Mental illness Sister     Cancer Paternal Aunt     Diabetes Brother       Social History     Tobacco Use    Smoking status: Former     Current packs/day: 0.00     Types: Cigarettes     Quit date: 2000     Years since quittin.1    Smokeless tobacco: Former   Vaping Use    Vaping status: Never Used   Substance Use Topics    Alcohol use: Not Currently     Comment:  socially - rare    Drug use: Yes     Frequency: 7.0 times per week     Types: Marijuana     Comment: every day medical marijuana, oil or smoke      E-Cigarette/Vaping    E-Cigarette Use Never User       E-Cigarette/Vaping Substances    Nicotine No     THC No     CBD No     Flavoring No     Other No     Unknown No       I have reviewed and agree with the history as documented.     68 y/o female presents to the ED for flu like symptoms x 5 days. States that she has had cough and congestion x 5 days. States that symptom have been worsening and now she feels SOB. She denies any cp, fever, abd pain, n/v, or d/c. No hx of asthma or copd but does have an inhaler and states that she has been smoking marijuana for yrs. +sick contacts-  with similar symptoms recently. No other complaints.       History provided by:  Patient  Flu Symptoms  Presenting symptoms: cough, myalgias and shortness of breath    Presenting symptoms: no diarrhea, no fever, no headaches, no nausea, no sore throat and no vomiting    Severity:  Moderate  Onset quality:  Sudden  Progression:  Worsening  Chronicity:  New  Relieved by:  None tried  Worsened by:  Nothing  Ineffective treatments:  None tried  Associated symptoms: nasal congestion    Associated symptoms: no chills, no ear pain and no neck stiffness    Risk factors: sick contacts        Review of Systems   Constitutional:  Negative for chills and fever.   HENT:  Positive for congestion. Negative for ear pain and sore throat.    Eyes:  Negative for pain and visual disturbance.   Respiratory:  Positive for cough and shortness of breath. Negative for wheezing.    Cardiovascular:  Negative for chest pain and leg swelling.   Gastrointestinal:  Negative for abdominal pain, diarrhea, nausea and vomiting.   Genitourinary:  Negative for dysuria, frequency, hematuria and urgency.   Musculoskeletal:  Positive for myalgias. Negative for neck pain and neck stiffness.   Skin:  Negative for rash and  wound.   Neurological:  Negative for weakness, numbness and headaches.   Psychiatric/Behavioral:  Negative for agitation and confusion.    All other systems reviewed and are negative.          Objective       ED Triage Vitals [01/16/25 0931]   Temperature Pulse Blood Pressure Respirations SpO2 Patient Position - Orthostatic VS   98.8 °F (37.1 °C) 60 (!) 172/99 22 98 % Sitting      Temp Source Heart Rate Source BP Location FiO2 (%) Pain Score    Temporal Monitor Left arm -- --      Vitals      Date and Time Temp Pulse SpO2 Resp BP Pain Score FACES Pain Rating User   01/16/25 1500 -- 71 92 % 20 140/77 -- -- BH   01/16/25 1430 -- 65 92 % 21 140/67 -- -- FB   01/16/25 1400 -- 69 92 % 20 152/74 -- -- RS   01/16/25 1300 -- 69 93 % 20 153/71 -- -- RS   01/16/25 1230 -- 70 92 % 20 169/78 -- -- RS   01/16/25 1115 -- 73 88 % 20 -- -- -- FB   01/16/25 1030 -- 73 97 % 24 142/79 -- -- FB   01/16/25 0931 98.8 °F (37.1 °C) 60 98 % 22 172/99 -- -- AS            Physical Exam  Vitals and nursing note reviewed.   Constitutional:       Appearance: She is well-developed.   HENT:      Head: Normocephalic and atraumatic.   Eyes:      Pupils: Pupils are equal, round, and reactive to light.   Cardiovascular:      Rate and Rhythm: Normal rate and regular rhythm.   Pulmonary:      Effort: Pulmonary effort is normal.      Breath sounds: Wheezing present.   Abdominal:      General: Bowel sounds are normal.      Palpations: Abdomen is soft.   Musculoskeletal:         General: Normal range of motion.      Cervical back: Normal range of motion and neck supple.   Skin:     General: Skin is warm and dry.   Neurological:      General: No focal deficit present.      Mental Status: She is alert and oriented to person, place, and time.      Comments: No focal deficits         Results Reviewed       Procedure Component Value Units Date/Time    HS Troponin I 4hr [[id number]]  (Normal) Collected: 01/16/25 1426    Lab Status: Final result Specimen: Blood  from Arm, Right Updated: 01/16/25 1457     hs TnI 4hr 12 ng/L      Delta 4hr hsTnI -4 ng/L     Platelet count [[id number]]  (Normal) Collected: 01/16/25 1426    Lab Status: Final result Specimen: Blood from Arm, Right Updated: 01/16/25 1431     Platelets 207 Thousands/uL      MPV 9.8 fL     Hemoglobin A1c w/EAG Estimation (Prechecked if no A1C within 90 days) [[id number]] Collected: 01/16/25 1426    Lab Status: In process Specimen: Blood from Arm, Right Updated: 01/16/25 1429    Fingerstick Glucose (POCT) [[id number]]  (Abnormal) Collected: 01/16/25 1419    Lab Status: Final result Specimen: Blood Updated: 01/16/25 1420     POC Glucose 219 mg/dl     HS Troponin I 2hr [[id number]]  (Normal) Collected: 01/16/25 1209    Lab Status: Final result Specimen: Blood from Arm, Right Updated: 01/16/25 1237     hs TnI 2hr 12 ng/L      Delta 2hr hsTnI -4 ng/L     FLU/RSV/COVID - if FLU/RSV clinically relevant (2hr TAT) [[id number]]  (Abnormal) Collected: 01/16/25 1009    Lab Status: Final result Specimen: Nares from Nose Updated: 01/16/25 1101     SARS-CoV-2 Negative     INFLUENZA A PCR Negative     INFLUENZA B PCR Negative     RSV PCR Positive    Narrative:      This test has been performed using the CoV-2/Flu/RSV plus assay on the [employer] GeneXpert platform. This test has been validated by the  and verified by the performing laboratory.     This test is designed to amplify and detect the following: nucleocapsid (N), envelope (E), and RNA-dependent RNA polymerase (RdRP) genes of the SARS-CoV-2 genome; matrix (M), basic polymerase (PB2), and acidic protein (PA) segments of the influenza A genome; matrix (M) and non-structural protein (NS) segments of the influenza B genome, and the nucleocapsid genes of RSV A and RSV B.     Positive results are indicative of the presence of Flu A, Flu B, RSV, and/or SARS-CoV-2 RNA. Positive results for SARS-CoV-2 or suspected novel influenza should be reported to state, local, or federal  health departments according to local reporting requirements.      All results should be assessed in conjunction with clinical presentation and other laboratory markers for clinical management.     FOR PEDIATRIC PATIENTS - copy/paste COVID Guidelines URL to browser: [email or web address]       HS Troponin 0hr (reflex protocol) [[id number]]  (Normal) Collected: 01/16/25 1009    Lab Status: Final result Specimen: Blood from Arm, Right Updated: 01/16/25 1039     hs TnI 0hr 16 ng/L     Comprehensive metabolic panel [[id number]]  (Abnormal) Collected: 01/16/25 1009    Lab Status: Final result Specimen: Blood from Arm, Right Updated: 01/16/25 1032     Sodium 140 mmol/L      Potassium 3.0 mmol/L      Chloride 103 mmol/L      CO2 26 mmol/L      ANION GAP 11 mmol/L      BUN 18 mg/dL      Creatinine 0.78 mg/dL      Glucose 166 mg/dL      Calcium 8.9 mg/dL      AST 25 U/L      ALT 24 U/L      Alkaline Phosphatase 82 U/L      Total Protein 7.5 g/dL      Albumin 4.3 g/dL      Total Bilirubin 0.68 mg/dL      eGFR 78 ml/min/1.73sq m     Narrative:      National Kid[employer] guidelines for Chronic Kidney Disease (CKD):     Stage 1 with normal or high GFR (GFR > 90 mL/min/1.73 square meters)    Stage 2 Mild CKD (GFR = 60-89 mL/min/1.73 square meters)    Stage 3A Moderate CKD (GFR = 45-59 mL/min/1.73 square meters)    Stage 3B Moderate CKD (GFR = 30-44 mL/min/1.73 square meters)    Stage 4 Severe CKD (GFR = 15-29 mL/min/1.73 square meters)    Stage 5 End Stage CKD (GFR <15 mL/min/1.73 square meters)  Note: GFR calculation is accurate only with a steady state creatinine    CBC and differential [[id number]]  (Abnormal) Collected: 01/16/25 1009    Lab Status: Final result Specimen: Blood from Arm, Right Updated: 01/16/25 1018     WBC 8.51 Thousand/uL      RBC 5.62 Million/uL      Hemoglobin 15.5 g/dL      Hematocrit 48.5 %      MCV 86 fL      MCH 27.6 pg      MCHC 32.0 g/dL       RDW 14.7 %      MPV 10.2 fL      Platelets 245 Thousands/uL      nRBC 0 /100 WBCs      Segmented % 61 %      Immature Grans % 1 %      Lymphocytes % 24 %      Monocytes % 11 %      Eosinophils Relative 2 %      Basophils Relative 1 %      Absolute Neutrophils 5.26 Thousands/µL      Absolute Immature Grans 0.04 Thousand/uL      Absolute Lymphocytes 2.05 Thousands/µL      Absolute Monocytes 0.96 Thousand/µL      Eosinophils Absolute 0.16 Thousand/µL      Basophils Absolute 0.04 Thousands/µL             X-ray chest 1 view portable   Final Interpretation by Tyson Mclaughlin MD (01/16 1151)      No acute cardiopulmonary disease.            Workstation performed: [id number]             Procedures    ED Medication and Procedure Management   Prior to Admission Medications   Prescriptions Last Dose Informant Patient Reported? Taking?   DULoxetine (CYMBALTA) 60 mg delayed release capsule   No No   Sig: Take 1 capsule (60 mg total) by mouth daily   albuterol (PROVENTIL HFA,VENTOLIN HFA) 90 mcg/act inhaler   No No   Sig: TAKE 2 PUFFS BY MOUTH EVERY 4 HOURS AS NEEDED FOR WHEEZE   atorvastatin (LIPITOR) 40 mg tablet   No No   Sig: Take 1 tablet (40 mg total) by mouth daily   busPIRone (BUSPAR) 5 mg tablet   No No   Sig: Take 1 tablet (5 mg total) by mouth in the morning   celecoxib (CeleBREX) 100 mg capsule   No No   Sig: Take 1 capsule (100 mg total) by mouth 2 (two) times a day   diazepam (VALIUM) 5 mg tablet   No No   Sig: Take 1 tablet (5 mg total) by mouth every 6 (six) hours as needed for anxiety   fexofenadine (ALLEGRA) 180 MG tablet   No No   Sig: TAKE 1 TABLET BY MOUTH EVERY DAY   gabapentin (NEURONTIN) 300 mg capsule   No No   Sig: Take 2 capsules (600 mg total) by mouth daily   glipiZIDE (GLUCOTROL XL) 10 mg 24 hr tablet   No No   Sig: Take 2 tablets (20 mg total) by mouth daily   hydroCHLOROthiazide 25 mg tablet   No No   Sig: Take 1 tablet (25 mg total) by mouth daily   insulin glargine (Toujeo Max SoloStar) 300  units/mL CONCENTRATED U-300 injection pen (2-unit dial)   No No   Sig: Inject 50 Units under the skin daily at bedtime   omeprazole (PriLOSEC) 40 MG capsule   No No   Sig: TAKE 1 CAPSULE BY MOUTH DAILY   Patient not taking: Reported on 10/21/2024      Facility-Administered Medications: None     Patient's Medications   Discharge Prescriptions    No medications on file     No discharge procedures on file.  ED SEPSIS DOCUMENTATION   Time reflects when diagnosis was documented in both MDM as applicable and the Disposition within this note       Time User Action Codes Description Comment    1/16/2025 12:31 PM Alla Rick Add [J20.5] RSV bronchitis     1/16/2025 12:31 PM Alla Rick Add [R09.02] Hypoxia                  Alla Rick DO  01/16/25 1539

## 2025-01-17 LAB
ATRIAL RATE: 76 BPM
ATRIAL RATE: 78 BPM
GLUCOSE SERPL-MCNC: 205 MG/DL (ref 65–140)
GLUCOSE SERPL-MCNC: 281 MG/DL (ref 65–140)
GLUCOSE SERPL-MCNC: 311 MG/DL (ref 65–140)
GLUCOSE SERPL-MCNC: 395 MG/DL (ref 65–140)
P AXIS: 81 DEGREES
P AXIS: 82 DEGREES
PR INTERVAL: 132 MS
PR INTERVAL: 134 MS
QRS AXIS: 55 DEGREES
QRS AXIS: 68 DEGREES
QRSD INTERVAL: 70 MS
QRSD INTERVAL: 74 MS
QT INTERVAL: 406 MS
QT INTERVAL: 430 MS
QTC INTERVAL: 462 MS
QTC INTERVAL: 483 MS
T WAVE AXIS: 57 DEGREES
T WAVE AXIS: 59 DEGREES
VENTRICULAR RATE: 76 BPM
VENTRICULAR RATE: 78 BPM

## 2025-01-17 PROCEDURE — 82948 REAGENT STRIP/BLOOD GLUCOSE: CPT

## 2025-01-17 PROCEDURE — 99232 SBSQ HOSP IP/OBS MODERATE 35: CPT

## 2025-01-17 PROCEDURE — 94760 N-INVAS EAR/PLS OXIMETRY 1: CPT

## 2025-01-17 PROCEDURE — 94640 AIRWAY INHALATION TREATMENT: CPT

## 2025-01-17 PROCEDURE — 93010 ELECTROCARDIOGRAM REPORT: CPT | Performed by: STUDENT IN AN ORGANIZED HEALTH CARE EDUCATION/TRAINING PROGRAM

## 2025-01-17 RX ORDER — IPRATROPIUM BROMIDE AND ALBUTEROL SULFATE 2.5; .5 MG/3ML; MG/3ML
3 SOLUTION RESPIRATORY (INHALATION)
Status: DISCONTINUED | OUTPATIENT
Start: 2025-01-17 | End: 2025-01-17

## 2025-01-17 RX ORDER — LEVALBUTEROL INHALATION SOLUTION 1.25 MG/3ML
1.25 SOLUTION RESPIRATORY (INHALATION)
Status: DISCONTINUED | OUTPATIENT
Start: 2025-01-17 | End: 2025-01-19 | Stop reason: HOSPADM

## 2025-01-17 RX ORDER — INSULIN LISPRO 100 [IU]/ML
2-12 INJECTION, SOLUTION INTRAVENOUS; SUBCUTANEOUS
Status: DISCONTINUED | OUTPATIENT
Start: 2025-01-17 | End: 2025-01-19 | Stop reason: HOSPADM

## 2025-01-17 RX ORDER — INSULIN LISPRO 100 [IU]/ML
10 INJECTION, SOLUTION INTRAVENOUS; SUBCUTANEOUS
Status: DISCONTINUED | OUTPATIENT
Start: 2025-01-17 | End: 2025-01-19 | Stop reason: HOSPADM

## 2025-01-17 RX ORDER — BENZONATATE 100 MG/1
200 CAPSULE ORAL 3 TIMES DAILY
Status: DISCONTINUED | OUTPATIENT
Start: 2025-01-17 | End: 2025-01-19 | Stop reason: HOSPADM

## 2025-01-17 RX ORDER — HYDROCODONE BITARTRATE AND HOMATROPINE METHYLBROMIDE ORAL SOLUTION 5; 1.5 MG/5ML; MG/5ML
5 LIQUID ORAL EVERY 4 HOURS PRN
Refills: 0 | Status: DISCONTINUED | OUTPATIENT
Start: 2025-01-17 | End: 2025-01-19 | Stop reason: HOSPADM

## 2025-01-17 RX ADMIN — DULOXETINE HYDROCHLORIDE 60 MG: 60 CAPSULE, DELAYED RELEASE ORAL at 09:15

## 2025-01-17 RX ADMIN — GABAPENTIN 600 MG: 300 CAPSULE ORAL at 09:15

## 2025-01-17 RX ADMIN — INSULIN LISPRO 3 UNITS: 100 INJECTION, SOLUTION INTRAVENOUS; SUBCUTANEOUS at 18:45

## 2025-01-17 RX ADMIN — METHYLPREDNISOLONE SODIUM SUCCINATE 40 MG: 40 INJECTION, POWDER, FOR SOLUTION INTRAMUSCULAR; INTRAVENOUS at 09:16

## 2025-01-17 RX ADMIN — HYDROCODONE BITARTRATE AND HOMATROPINE METHYLBROMIDE 5 ML: 1.5; 5 SOLUTION ORAL at 14:26

## 2025-01-17 RX ADMIN — IPRATROPIUM BROMIDE AND ALBUTEROL SULFATE 3 ML: 2.5; .5 SOLUTION RESPIRATORY (INHALATION) at 10:28

## 2025-01-17 RX ADMIN — HYDROCODONE BITARTRATE AND HOMATROPINE METHYLBROMIDE 5 ML: 1.5; 5 SOLUTION ORAL at 09:32

## 2025-01-17 RX ADMIN — LEVALBUTEROL HYDROCHLORIDE 1.25 MG: 1.25 SOLUTION RESPIRATORY (INHALATION) at 19:55

## 2025-01-17 RX ADMIN — INSULIN LISPRO 3 UNITS: 100 INJECTION, SOLUTION INTRAVENOUS; SUBCUTANEOUS at 12:17

## 2025-01-17 RX ADMIN — HYDROCHLOROTHIAZIDE 25 MG: 25 TABLET ORAL at 09:15

## 2025-01-17 RX ADMIN — BUSPIRONE HYDROCHLORIDE 5 MG: 5 TABLET ORAL at 09:16

## 2025-01-17 RX ADMIN — BENZONATATE 200 MG: 100 CAPSULE ORAL at 23:36

## 2025-01-17 RX ADMIN — BENZONATATE 200 MG: 100 CAPSULE ORAL at 09:15

## 2025-01-17 RX ADMIN — BENZONATATE 200 MG: 100 CAPSULE ORAL at 18:12

## 2025-01-17 RX ADMIN — INSULIN LISPRO 10 UNITS: 100 INJECTION, SOLUTION INTRAVENOUS; SUBCUTANEOUS at 23:31

## 2025-01-17 RX ADMIN — HYDROCODONE BITARTRATE AND HOMATROPINE METHYLBROMIDE 5 ML: 1.5; 5 SOLUTION ORAL at 18:18

## 2025-01-17 RX ADMIN — ATORVASTATIN CALCIUM 40 MG: 40 TABLET, FILM COATED ORAL at 09:15

## 2025-01-17 RX ADMIN — IPRATROPIUM BROMIDE 0.5 MG: 0.5 SOLUTION RESPIRATORY (INHALATION) at 19:55

## 2025-01-17 RX ADMIN — LEVALBUTEROL HYDROCHLORIDE 1.25 MG: 1.25 SOLUTION RESPIRATORY (INHALATION) at 13:56

## 2025-01-17 RX ADMIN — ENOXAPARIN SODIUM 40 MG: 40 INJECTION SUBCUTANEOUS at 18:13

## 2025-01-17 RX ADMIN — INSULIN LISPRO 10 UNITS: 100 INJECTION, SOLUTION INTRAVENOUS; SUBCUTANEOUS at 18:44

## 2025-01-17 RX ADMIN — INSULIN LISPRO 1 UNITS: 100 INJECTION, SOLUTION INTRAVENOUS; SUBCUTANEOUS at 09:20

## 2025-01-17 RX ADMIN — INSULIN GLARGINE 50 UNITS: 100 INJECTION, SOLUTION SUBCUTANEOUS at 23:31

## 2025-01-17 RX ADMIN — IPRATROPIUM BROMIDE 0.5 MG: 0.5 SOLUTION RESPIRATORY (INHALATION) at 13:56

## 2025-01-17 RX ADMIN — ENOXAPARIN SODIUM 40 MG: 40 INJECTION SUBCUTANEOUS at 09:19

## 2025-01-17 RX ADMIN — METHYLPREDNISOLONE SODIUM SUCCINATE 40 MG: 40 INJECTION, POWDER, FOR SOLUTION INTRAMUSCULAR; INTRAVENOUS at 23:28

## 2025-01-17 NOTE — CASE MANAGEMENT
Case Management Assessment & Discharge Planning Note    Patient name Hattie Styles  Location /-01 MRN [id number]  : 1955 Date 2025       Current Admission Date: 2025  Current Admission Diagnosis:Acute hypoxic respiratory failure (HCC)   Patient Active Problem List    Diagnosis Date Noted Date Diagnosed    RSV bronchitis 2025     Hypokalemia 2025     Depression, recurrent (HCC) 2024     Grief 2024     Dyspnea 2024     Acute hypoxic respiratory failure (HCC) 2024     Diverticulosis 2023     Pancreatic cyst 2023     Lumbar facet arthropathy 2023     Mixed hyperlipidemia 2022     Primary hypertension 2022     Type 2 diabetes mellitus with diabetic polyneuropathy, with long-term current use of insulin (HCC) 2022     Obesity, morbid (HCC) 2022       LOS (days): 0  Geometric Mean LOS (GMLOS) (days):   Days to GMLOS:     OBJECTIVE:              Current admission status: Inpatient       Preferred Pharmacy:   [facility]/pharmacy #[id number] - [facility] SHAAN Dejesus - [address]  [address] [address]  Phone: [phone] Fax: [phone]    Primary Care Provider: Linda Styles DO    Primary Insurance: MEDICARE  Secondary Insurance: AETNA    ASSESSMENT:  Active Health Care Proxies       RONI STYLES Care Representative - Spouse   Primary Phone: [phone] (Mobile)                 Advance Directives  Does patient have a Health Care POA?: No  Was patient offered paperwork?: Yes (Spouse to make decisions.)  Does patient currently have a Health Care decision maker?: Yes, please see Health Care Proxy section  Does patient have Advance Directives?: No  Was patient offered paperwork?: Yes (Spouse to make decisions.)  Primary Contact: Roni Styles, [place]         Readmission Root Cause  30 Day Readmission: No    Patient Information  Admitted from:: Home  Mental Status: Alert  During  Assessment patient was accompanied by: Spouse  Assessment information provided by:: Patient  Primary Caregiver: Self  Support Systems: Self, Spouse/significant other, Family members  County of Residence: [place]  What city do you live in?: [place]  Home entry access options. Select all that apply.: Stairs  Number of steps to enter home.: 5  Do the steps have railings?: Yes  Type of Current Residence: [facility]  Living Arrangements: Lives w/ Spouse/significant other  Is patient a ?: No    Activities of Daily Living Prior to Admission  Functional Status: Independent  Completes ADLs independently?: Yes  Ambulates independently?: Yes  Does patient use assisted devices?: Yes  Assisted Devices (DME) used: Straight Cane, Walker  Does patient currently own DME?: Yes  What DME does the patient currently own?: Walker, Straight Cane  Does patient have a history of Outpatient Therapy (PT/OT)?: No  Does the patient have a history of Short-Term Rehab?: No  Does patient have a history of HHC?: Yes  Does patient currently have HHC?: No         Patient Information Continued  Income Source: SSI/SSD  Does patient have prescription coverage?: Yes  Does patient receive dialysis treatments?: No  Does patient have a history of substance abuse?: No (Has medical marijuana card)  Does patient have a history of Mental Health Diagnosis?: No         Means of Transportation  [facility]      Social Determinants of Health (SDOH)      Flowsheet Row Most Recent Value   Housing Stability    In the last 12 months, was there a time when you were not able to pay the mortgage or rent on time? N   In the past 12 months, how many times have you moved where you were living? 0   At any time in the past 12 months, were you homeless or living in a shelter (including now)? N   Transportation Needs    In the past 12 months, has lack of transportation kept you from medical appointments or from getting medications? no    In the past 12 months, has lack of transportation kept you from meetings, work, or from getting things needed for daily living? No   Food Insecurity    Within the past 12 months, you worried that your food would run out before you got the money to buy more. Never true   Within the past 12 months, the food you bought just didn't last and you didn't have money to get more. Never true   Utilities    In the past 12 months has the electric, gas, oil, or water company threatened to shut off services in your home? No            DISCHARGE DETAILS:    Discharge planning discussed with:: Patient  Freedom of Choice: Yes  Comments - Freedom of Choice: CM discussed discharge planning and freedom of choice if services are recommended by [facility].  CM contacted family/caregiver?: No- see comments (Patient declined)  Were Treatment Team discharge recommendations reviewed with patient/caregiver?: Yes  Did patient/caregiver verbalize understanding of patient care needs?: Yes  Were patient/caregiver advised of the risks associated with not following Treatment Team discharge recommendations?: Yes    Contacts  Patient Contacts: FER STYLES (Spouse)  [phone] (Mobile)  Relationship to Patient:: Family  Reason/Outcome: Continuity of Care, Emergency Contact, Discharge Planning    Requested Home Health Care         Is the patient interested in HHC at discharge?: No    DME Referral Provided  Referral made for DME?: No    Other Referral/Resources/Interventions Provided:  Interventions: None Indicated    Would you like to participate in our [facility] service program?  : No - Declined    Treatment Team Recommendation: Home  Discharge Destination Plan:: Home  Transport at Discharge : Family

## 2025-01-17 NOTE — ASSESSMENT & PLAN NOTE
RSV bronchitis  Chest x-ray did not show any infiltrate/pleural effusion  Plan:  Will continue with IV methylprednisone 40 mg every 12 hourly  Incentive spirometry  DuoNebs every 6 hourly  Started Hycodan given worsening cough as of this morning.  Patient reports Robitussin did not help at home.  Increase Tessalon Perles to 200 mg 3 times a day

## 2025-01-17 NOTE — PROGRESS NOTES
Progress Note - Hospitalist   Name: Hattie Patton 69 y.o. female I MRN: [id number]  Unit/Bed#: -01 I Date of Admission: 1/16/2025   Date of Service: 1/17/2025 I Hospital Day: 0    Assessment & Plan  Acute hypoxic respiratory failure (HCC)  RSV bronchitis  Chest x-ray did not show any infiltrate/pleural effusion  Plan:  Will continue with IV methylprednisone 40 mg every 12 hourly  Incentive spirometry  DuoNebs every 6 hourly  Started Hycodan given worsening cough as of this morning.  Patient reports Robitussin did not help at home.  Increase Tessalon Perles to 200 mg 3 times a day    Mixed hyperlipidemia  Continue with statins  Primary hypertension  Continue with hydrochlorothiazide 25 mg  Type 2 diabetes mellitus with diabetic polyneuropathy, with long-term current use of insulin ([facility])  Lab Results   Component Value Date    HGBA1C 7.8 (H) 01/16/2025       Recent Labs     01/16/25  1419 01/16/25  2112 01/17/25  0736 01/17/25  1152   POCGLU 219* 422* 205* 281*       Blood Sugar Average: Last 72 hrs:  (P) 281.75Hold oral hypoglycemic  Will continue with Lantus 50 units at bedtime which is patient's home regiment  Due to IV steroids patient has uncontrolled sugars and will start lispro 10 units with meals  Insulin sliding scale  Hypoglycemia protocol    RSV bronchitis  Symptomatic management  Hypokalemia  P.o. KCl 40 mg x 1 given by ER  Will give another 80 mEq per oral    VTE Pharmacologic Prophylaxis:   High Risk (Score >/= 5) - Pharmacological DVT Prophylaxis Ordered: enoxaparin (Lovenox). Sequential Compression Devices Ordered.    Mobility:   Basic Mobility Inpatient Raw Score: 18  JH-HLM Goal: 6: Walk 10 steps or more  JH-HLM Achieved: 6: Walk 10 steps or more  JH-HLM Goal achieved. Continue to encourage appropriate mobility.    Patient Centered Rounds: I performed bedside rounds with nursing staff today.   Discussions with Specialists or Other Care Team Provider: Discussed with     Education  and Discussions with Family / Patient: Patient declined call to .     Current Length of Stay: 0 day(s)  Current Patient Status: Inpatient   Certification Statement: The patient will continue to require additional inpatient hospital stay due to acute respiratory failure in the setting of RSV  Discharge Plan: Anticipate discharge in 24-48 hrs to home.    Code Status: Level 1 - Full Code    Subjective   Patient reports feeling very short of breath and due to increased cough feels that     Objective :  Temp:  [97.7 °F (36.5 °C)-98 °F (36.7 °C)] 98 °F (36.7 °C)  HR:  [72-88] 87  BP: (135-179)/(77-86) 179/83  Resp:  [20-23] 22  SpO2:  [93 %-96 %] 94 %  O2 Device: Nasal cannula  Nasal Cannula O2 Flow Rate (L/min):  [2 L/min] 2 L/min    Body mass index is 48.95 kg/m².     Input and Output Summary (last 24 hours):     Intake/Output Summary (Last 24 hours) at 1/17/2025 1554  Last data filed at 1/17/2025 1230  Gross per 24 hour   Intake 480 ml   Output --   Net 480 ml       Physical Exam  Vitals and nursing note reviewed.   Constitutional:       General: She is not in acute distress.     Appearance: She is well-developed. She is obese. She is ill-appearing.   HENT:      Head: Normocephalic and atraumatic.   Eyes:      Conjunctiva/sclera: Conjunctivae normal.   Cardiovascular:      Rate and Rhythm: Normal rate and regular rhythm.      Heart sounds: No murmur heard.  Pulmonary:      Effort: Pulmonary effort is normal. No respiratory distress.      Breath sounds: Wheezing and rhonchi present.   Abdominal:      Palpations: Abdomen is soft.      Tenderness: There is no abdominal tenderness.   Musculoskeletal:         General: No swelling.      Cervical back: Neck supple.      Right lower leg: No edema.      Left lower leg: No edema.   Skin:     General: Skin is warm and dry.      Capillary Refill: Capillary refill takes less than 2 seconds.   Neurological:      Mental Status: She is alert.   Psychiatric:         Mood  and Affect: Mood normal.           Lines/Drains:              Lab Results: I have reviewed the following results:   Results from last 7 days   Lab Units 01/16/25  1426 01/16/25  1009   WBC Thousand/uL  --  8.51   HEMOGLOBIN g/dL  --  15.5*   HEMATOCRIT %  --  48.5*   PLATELETS Thousands/uL 207 245   SEGS PCT %  --  61   LYMPHO PCT %  --  24   MONO PCT %  --  11   EOS PCT %  --  2     Results from last 7 days   Lab Units 01/16/25  1009   SODIUM mmol/L 140   POTASSIUM mmol/L 3.0*   CHLORIDE mmol/L 103   CO2 mmol/L 26   BUN mg/dL 18   CREATININE mg/dL 0.78   ANION GAP mmol/L 11   CALCIUM mg/dL 8.9   ALBUMIN g/dL 4.3   TOTAL BILIRUBIN mg/dL 0.68   ALK PHOS U/L 82   ALT U/L 24   AST U/L 25   GLUCOSE RANDOM mg/dL 166*         Results from last 7 days   Lab Units 01/17/25  1152 01/17/25  0736 01/16/25  2112 01/16/25  1419   POC GLUCOSE mg/dl 281* 205* 422* 219*     Results from last 7 days   Lab Units 01/16/25  1426   HEMOGLOBIN A1C % 7.8*           Recent Cultures (last 7 days):         Imaging Results Review: I reviewed radiology reports from this admission including: chest xray.  Other Study Results Review: No additional pertinent studies reviewed.    Last 24 Hours Medication List:     Current Facility-Administered Medications:     albuterol (PROVENTIL HFA,VENTOLIN HFA) inhaler 2 puff, Q4H PRN    atorvastatin (LIPITOR) tablet 40 mg, Daily    benzonatate (TESSALON PERLES) capsule 200 mg, TID    busPIRone (BUSPAR) tablet 5 mg, Daily    diazepam (VALIUM) tablet 5 mg, Q6H PRN    DULoxetine (CYMBALTA) delayed release capsule 60 mg, Daily    enoxaparin (LOVENOX) subcutaneous injection 40 mg, BID    gabapentin (NEURONTIN) capsule 600 mg, Daily    hydroCHLOROthiazide tablet 25 mg, Daily    HYDROcodone Bit-Homatrop MBr (HYCODAN) oral syrup 5 mL, Q4H PRN    insulin glargine (LANTUS) subcutaneous injection 50 Units 0.5 mL, HS    insulin lispro (HumALOG/ADMELOG) 100 units/mL subcutaneous injection 1-5 Units, TID AC **AND**  Fingerstick Glucose (POCT), TID AC    ipratropium (ATROVENT) 0.02 % inhalation solution 0.5 mg, TID    levalbuterol (XOPENEX) inhalation solution 1.25 mg, TID    methylPREDNISolone sodium succinate (Solu-MEDROL) injection 40 mg, Q12H KRISTINE    Insert peripheral IV, Once **AND** sodium chloride (PF) 0.9 % injection 3 mL, Q1H PRN    Administrative Statements   Today, Patient Was Seen By: Carolee Islas MD      **Please Note: This note may have been constructed using a voice recognition system.**

## 2025-01-17 NOTE — PLAN OF CARE
Problem: RESPIRATORY - ADULT  Goal: Achieves optimal ventilation and oxygenation  Description: INTERVENTIONS:  - Assess for changes in respiratory status  - Assess for changes in mentation and behavior  - Position to facilitate oxygenation and minimize respiratory effort  - Oxygen administered by appropriate delivery if ordered  - Initiate smoking cessation education as indicated  - Encourage broncho-pulmonary hygiene including cough, deep breathe, Incentive Spirometry  - Assess the need for suctioning and aspirate as needed  - Assess and instruct to report SOB or any respiratory difficulty  - Respiratory Therapy support as indicated  Outcome: Progressing     Problem: INFECTION - ADULT  Goal: Absence or prevention of progression during hospitalization  Description: INTERVENTIONS:  - Assess and monitor for signs and symptoms of infection  - Monitor lab/diagnostic results  - Monitor all insertion sites, i.e. indwelling lines, tubes, and drains  - Monitor endotracheal if appropriate and nasal secretions for changes in amount and color  - [place] appropriate cooling/warming therapies per order  - Administer medications as ordered  - Instruct and encourage patient and family to use good hand hygiene technique  - Identify and instruct in appropriate isolation precautions for identified infection/condition  Outcome: Progressing

## 2025-01-17 NOTE — MALNUTRITION/BMI
This medical record reflects one or more clinical indicators suggestive of Class 3 Obesity        BMI Findings:   Class 3 Obesity        Body mass index is 48.95 kg/m².     See Nutrition note dated 1/17/25 for additional details.  Completed nutrition assessment is viewable in the nutrition documentation.

## 2025-01-17 NOTE — RESPIRATORY THERAPY NOTE
RT Protocol Note  Hattie Patton 69 y.o. female MRN: [id number]  Unit/Bed#: -01 Encounter: [id number]    Assessment    Principal Problem:    Acute hypoxic respiratory failure (HCC)  Active Problems:    Mixed hyperlipidemia    Primary hypertension    Type 2 diabetes mellitus with diabetic polyneuropathy, with long-term current use of insulin (HCC)    RSV bronchitis    Hypokalemia      Home Pulmonary Medications:     25 1031   Respiratory Protocol   Protocol Initiated? Yes   Protocol Selection Respiratory   Language Barrier? No   Medical & Social History Reviewed? Yes   Diagnostic Studies Reviewed? Yes   Physical Assessment Performed? Yes   Respiratory Plan Mild Distress pathway   Respiratory Assessment   Assessment Type Pre-treatment   General Appearance Alert;Awake   Respiratory Pattern Dyspnea with exertion   Chest Assessment Chest expansion symmetrical   Bilateral Breath Sounds Coarse   Resp Comments patient diagnoseed with rsv and bronchitis, bbs scattered coarse wheezes, no pulm history, states she has rescue inhaler for when she exerts herself and gets sob, no home O2, will continue nebs TID   O2 Device nc   Additional Assessments   Pulse 88   SpO2 94 %            Past Medical History:   Diagnosis Date    Cancer (HCC)     Diabetes mellitus (HCC)     Diverticulitis of colon     Hypertension      Social History     Socioeconomic History    Marital status: /Civil Union     Spouse name: None    Number of children: None    Years of education: None    Highest education level: None   Occupational History    None   Tobacco Use    Smoking status: Former     Current packs/day: 0.00     Types: Cigarettes     Quit date: 2000     Years since quittin.1    Smokeless tobacco: Former   Vaping Use    Vaping status: Never Used   Substance and Sexual Activity    Alcohol use: Not Currently     Comment: socially - rare    Drug use: Yes     Frequency: 7.0 times per week     Types: Marijuana     Comment:  "every day medical marijuana, oil or smoke    Sexual activity: Not Currently     Partners: Male     Birth control/protection: None   Other Topics Concern    None   Social History Narrative    None     Social Drivers of Health     Financial Resource Strain: High Risk (10/16/2023)    Overall Financial Resource Strain (CARDIA)     Difficulty of Paying Living Expenses: Hard   Food Insecurity: No Food Insecurity (10/21/2024)    Hunger Vital Sign     Worried About Running Out of Food in the Last Year: Never true     Ran Out of Food in the Last Year: Never true   Transportation Needs: No Transportation Needs (10/21/2024)    PRAPARE - Transportation     Lack of Transportation (Medical): No     Lack of Transportation (Non-Medical): No   Physical Activity: Inactive (4/24/2024)    Exercise Vital Sign     Days of Exercise per Week: 0 days     Minutes of Exercise per Session: 0 min   Stress: Stress Concern Present (10/28/2022)    [employer] [employer] [facility] - Occupational Stress Questionnaire     Feeling of Stress : Rather much   Social Connections: Not on file   Intimate Partner Violence: Not on file   Housing Stability: Low Risk  (10/21/2024)    Housing Stability Vital Sign     Unable to Pay for Housing in the Last Year: No     Number of Times Moved in the Last Year: 0     Homeless in the Last Year: No       Subjective         Objective    Physical Exam:   Assessment Type: Pre-treatment  General Appearance: Alert, Awake  Respiratory Pattern: Dyspnea with exertion  Chest Assessment: Chest expansion symmetrical  Bilateral Breath Sounds: Coarse  O2 Device: nc    Vitals:  Blood pressure 139/81, pulse 88, temperature 98 °F (36.7 °C), resp. rate 22, height 5' 2\" (1.575 m), weight 121 kg (267 lb 10.2 oz), SpO2 94%.          Imaging and other studies:     O2 Device: nc     Plan    Respiratory Plan: Mild Distress pathway        Resp Comments: patient diagnoseed with rsv and bronchitis, bbs scattered coarse wheezes, no pulm " history, states she has rescue inhaler for when she exerts herself and gets sob, no home O2, will continue nebs TID

## 2025-01-17 NOTE — ASSESSMENT & PLAN NOTE
Lab Results   Component Value Date    HGBA1C 7.8 (H) 01/16/2025       Recent Labs     01/16/25  1419 01/16/25  2112 01/17/25  0736 01/17/25  1152   POCGLU 219* 422* 205* 281*       Blood Sugar Average: Last 72 hrs:  (P) 281.75Hold oral hypoglycemic  Will continue with Lantus 50 units at bedtime which is patient's home regiment  Due to IV steroids patient has uncontrolled sugars and will start lispro 10 units with meals  Insulin sliding scale  Hypoglycemia protocol

## 2025-01-18 LAB
ANION GAP SERPL CALCULATED.3IONS-SCNC: 8 MMOL/L (ref 4–13)
BASOPHILS # BLD AUTO: 0.01 THOUSANDS/ΜL (ref 0–0.1)
BASOPHILS NFR BLD AUTO: 0 % (ref 0–1)
BUN SERPL-MCNC: 26 MG/DL (ref 5–25)
CALCIUM SERPL-MCNC: 9.4 MG/DL (ref 8.4–10.2)
CHLORIDE SERPL-SCNC: 102 MMOL/L (ref 96–108)
CO2 SERPL-SCNC: 31 MMOL/L (ref 21–32)
CREAT SERPL-MCNC: 0.72 MG/DL (ref 0.6–1.3)
EOSINOPHIL # BLD AUTO: 0.07 THOUSAND/ΜL (ref 0–0.61)
EOSINOPHIL NFR BLD AUTO: 1 % (ref 0–6)
ERYTHROCYTE [DISTWIDTH] IN BLOOD BY AUTOMATED COUNT: 14.5 % (ref 11.6–15.1)
GFR SERPL CREATININE-BSD FRML MDRD: 86 ML/MIN/1.73SQ M
GLUCOSE SERPL-MCNC: 189 MG/DL (ref 65–140)
GLUCOSE SERPL-MCNC: 205 MG/DL (ref 65–140)
GLUCOSE SERPL-MCNC: 218 MG/DL (ref 65–140)
GLUCOSE SERPL-MCNC: 221 MG/DL (ref 65–140)
GLUCOSE SERPL-MCNC: 256 MG/DL (ref 65–140)
HCT VFR BLD AUTO: 43.1 % (ref 34.8–46.1)
HGB BLD-MCNC: 13.6 G/DL (ref 11.5–15.4)
IMM GRANULOCYTES # BLD AUTO: 0.07 THOUSAND/UL (ref 0–0.2)
IMM GRANULOCYTES NFR BLD AUTO: 1 % (ref 0–2)
LYMPHOCYTES # BLD AUTO: 0.85 THOUSANDS/ΜL (ref 0.6–4.47)
LYMPHOCYTES NFR BLD AUTO: 8 % (ref 14–44)
MAGNESIUM SERPL-MCNC: 2.2 MG/DL (ref 1.9–2.7)
MCH RBC QN AUTO: 27.2 PG (ref 26.8–34.3)
MCHC RBC AUTO-ENTMCNC: 31.6 G/DL (ref 31.4–37.4)
MCV RBC AUTO: 86 FL (ref 82–98)
MONOCYTES # BLD AUTO: 0.32 THOUSAND/ΜL (ref 0.17–1.22)
MONOCYTES NFR BLD AUTO: 3 % (ref 4–12)
NEUTROPHILS # BLD AUTO: 9.6 THOUSANDS/ΜL (ref 1.85–7.62)
NEUTS SEG NFR BLD AUTO: 87 % (ref 43–75)
NRBC BLD AUTO-RTO: 0 /100 WBCS
PHOSPHATE SERPL-MCNC: 4.1 MG/DL (ref 2.3–4.1)
PLATELET # BLD AUTO: 218 THOUSANDS/UL (ref 149–390)
PMV BLD AUTO: 10.4 FL (ref 8.9–12.7)
POTASSIUM SERPL-SCNC: 3.4 MMOL/L (ref 3.5–5.3)
RBC # BLD AUTO: 5 MILLION/UL (ref 3.81–5.12)
SODIUM SERPL-SCNC: 141 MMOL/L (ref 135–147)
WBC # BLD AUTO: 10.92 THOUSAND/UL (ref 4.31–10.16)

## 2025-01-18 PROCEDURE — 94760 N-INVAS EAR/PLS OXIMETRY 1: CPT

## 2025-01-18 PROCEDURE — 82948 REAGENT STRIP/BLOOD GLUCOSE: CPT

## 2025-01-18 PROCEDURE — 99232 SBSQ HOSP IP/OBS MODERATE 35: CPT

## 2025-01-18 PROCEDURE — 85025 COMPLETE CBC W/AUTO DIFF WBC: CPT

## 2025-01-18 PROCEDURE — 84100 ASSAY OF PHOSPHORUS: CPT

## 2025-01-18 PROCEDURE — 80048 BASIC METABOLIC PNL TOTAL CA: CPT

## 2025-01-18 PROCEDURE — 83735 ASSAY OF MAGNESIUM: CPT

## 2025-01-18 PROCEDURE — 94640 AIRWAY INHALATION TREATMENT: CPT

## 2025-01-18 RX ORDER — PREDNISONE 20 MG/1
40 TABLET ORAL DAILY
Status: DISCONTINUED | OUTPATIENT
Start: 2025-01-19 | End: 2025-01-19 | Stop reason: HOSPADM

## 2025-01-18 RX ORDER — INSULIN GLARGINE 100 [IU]/ML
55 INJECTION, SOLUTION SUBCUTANEOUS
Status: DISCONTINUED | OUTPATIENT
Start: 2025-01-18 | End: 2025-01-19 | Stop reason: HOSPADM

## 2025-01-18 RX ORDER — LORATADINE 10 MG/1
10 TABLET ORAL DAILY
Status: DISCONTINUED | OUTPATIENT
Start: 2025-01-18 | End: 2025-01-19 | Stop reason: HOSPADM

## 2025-01-18 RX ORDER — POTASSIUM CHLORIDE 1500 MG/1
40 TABLET, EXTENDED RELEASE ORAL ONCE
Status: COMPLETED | OUTPATIENT
Start: 2025-01-18 | End: 2025-01-18

## 2025-01-18 RX ORDER — GUAIFENESIN 600 MG/1
1200 TABLET, EXTENDED RELEASE ORAL EVERY 12 HOURS SCHEDULED
Status: DISCONTINUED | OUTPATIENT
Start: 2025-01-18 | End: 2025-01-19 | Stop reason: HOSPADM

## 2025-01-18 RX ADMIN — LEVALBUTEROL HYDROCHLORIDE 1.25 MG: 1.25 SOLUTION RESPIRATORY (INHALATION) at 18:00

## 2025-01-18 RX ADMIN — INSULIN GLARGINE 55 UNITS: 100 INJECTION, SOLUTION SUBCUTANEOUS at 22:10

## 2025-01-18 RX ADMIN — INSULIN LISPRO 1 UNITS: 100 INJECTION, SOLUTION INTRAVENOUS; SUBCUTANEOUS at 12:23

## 2025-01-18 RX ADMIN — POTASSIUM CHLORIDE 40 MEQ: 1500 TABLET, EXTENDED RELEASE ORAL at 12:24

## 2025-01-18 RX ADMIN — INSULIN LISPRO 6 UNITS: 100 INJECTION, SOLUTION INTRAVENOUS; SUBCUTANEOUS at 22:10

## 2025-01-18 RX ADMIN — BUSPIRONE HYDROCHLORIDE 5 MG: 5 TABLET ORAL at 08:21

## 2025-01-18 RX ADMIN — INSULIN LISPRO 10 UNITS: 100 INJECTION, SOLUTION INTRAVENOUS; SUBCUTANEOUS at 12:24

## 2025-01-18 RX ADMIN — INSULIN LISPRO 10 UNITS: 100 INJECTION, SOLUTION INTRAVENOUS; SUBCUTANEOUS at 08:22

## 2025-01-18 RX ADMIN — LEVALBUTEROL HYDROCHLORIDE 1.25 MG: 1.25 SOLUTION RESPIRATORY (INHALATION) at 07:21

## 2025-01-18 RX ADMIN — BENZONATATE 200 MG: 100 CAPSULE ORAL at 08:21

## 2025-01-18 RX ADMIN — ATORVASTATIN CALCIUM 40 MG: 40 TABLET, FILM COATED ORAL at 08:21

## 2025-01-18 RX ADMIN — INSULIN LISPRO 1 UNITS: 100 INJECTION, SOLUTION INTRAVENOUS; SUBCUTANEOUS at 08:21

## 2025-01-18 RX ADMIN — INSULIN LISPRO 2 UNITS: 100 INJECTION, SOLUTION INTRAVENOUS; SUBCUTANEOUS at 17:09

## 2025-01-18 RX ADMIN — GABAPENTIN 600 MG: 300 CAPSULE ORAL at 08:21

## 2025-01-18 RX ADMIN — ENOXAPARIN SODIUM 40 MG: 40 INJECTION SUBCUTANEOUS at 17:09

## 2025-01-18 RX ADMIN — HYDROCODONE BITARTRATE AND HOMATROPINE METHYLBROMIDE 5 ML: 1.5; 5 SOLUTION ORAL at 22:07

## 2025-01-18 RX ADMIN — ENOXAPARIN SODIUM 40 MG: 40 INJECTION SUBCUTANEOUS at 08:23

## 2025-01-18 RX ADMIN — IPRATROPIUM BROMIDE 0.5 MG: 0.5 SOLUTION RESPIRATORY (INHALATION) at 14:02

## 2025-01-18 RX ADMIN — HYDROCHLOROTHIAZIDE 25 MG: 25 TABLET ORAL at 08:21

## 2025-01-18 RX ADMIN — BENZONATATE 200 MG: 100 CAPSULE ORAL at 15:02

## 2025-01-18 RX ADMIN — IPRATROPIUM BROMIDE 0.5 MG: 0.5 SOLUTION RESPIRATORY (INHALATION) at 18:01

## 2025-01-18 RX ADMIN — INSULIN LISPRO 10 UNITS: 100 INJECTION, SOLUTION INTRAVENOUS; SUBCUTANEOUS at 17:09

## 2025-01-18 RX ADMIN — IPRATROPIUM BROMIDE 0.5 MG: 0.5 SOLUTION RESPIRATORY (INHALATION) at 07:21

## 2025-01-18 RX ADMIN — GUAIFENESIN 1200 MG: 600 TABLET ORAL at 15:02

## 2025-01-18 RX ADMIN — LORATADINE 10 MG: 10 TABLET ORAL at 15:02

## 2025-01-18 RX ADMIN — LEVALBUTEROL HYDROCHLORIDE 1.25 MG: 1.25 SOLUTION RESPIRATORY (INHALATION) at 14:02

## 2025-01-18 RX ADMIN — BENZONATATE 200 MG: 100 CAPSULE ORAL at 22:06

## 2025-01-18 RX ADMIN — DULOXETINE HYDROCHLORIDE 60 MG: 60 CAPSULE, DELAYED RELEASE ORAL at 08:21

## 2025-01-18 RX ADMIN — METHYLPREDNISOLONE SODIUM SUCCINATE 40 MG: 40 INJECTION, POWDER, FOR SOLUTION INTRAMUSCULAR; INTRAVENOUS at 09:29

## 2025-01-18 NOTE — ASSESSMENT & PLAN NOTE
Body mass index is 48.95 kg/m².    Recommend incorporating a more whole foods plant-predominant diet along with decreasing consumption of red meats and processed foods  Per AHA guidelines, recommend moderate-vigorous intensity exercise for 30 minutes a day for 5 days a week or a total of 150 min/week

## 2025-01-18 NOTE — ASSESSMENT & PLAN NOTE
Lab Results   Component Value Date    HGBA1C 7.8 (H) 01/16/2025       Recent Labs     01/17/25  1627 01/17/25  2030 01/18/25  0740 01/18/25  1111   POCGLU 311* 395* 205* 189*       Blood Sugar Average: Last 72 hrs:  (P) 278.375Hold oral hypoglycemic  Increase Lantus to 55 units at bedtime started lispro 10 units with meals  Due to IV steroids patient has uncontrolled sugars and will start lispro 10 units with meals  Insulin sliding scale  Hypoglycemia protocol

## 2025-01-18 NOTE — ASSESSMENT & PLAN NOTE
RSV bronchitis  Chest x-ray did not show any infiltrate/pleural effusion  Plan:  Patient reports improvement in her symptoms.  Still reports shortness of breath and cough.  Patient continues to require 2 L of O2 and will wean as tolerated.  If patient continues to require O2 will to home O2 eval on discharge.  Tapered to oral steroids starting tomorrow.  Incentive spirometry  DuoNebs every 6 hourly  Started Hycodan given worsening cough as of this morning.  Patient reports Robitussin did not help at home.  Increase Tessalon Perles to 200 mg 3 times a day

## 2025-01-18 NOTE — PROGRESS NOTES
Progress Note - Hospitalist   Name: Hattie Patton 69 y.o. female I MRN: [id number]  Unit/Bed#: -01 I Date of Admission: 1/16/2025   Date of Service: 1/18/2025 I Hospital Day: 1    Assessment & Plan  Acute hypoxic respiratory failure (HCC)  RSV bronchitis  Chest x-ray did not show any infiltrate/pleural effusion  Plan:  Patient reports improvement in her symptoms.  Still reports shortness of breath and cough.  Patient continues to require 2 L of O2 and will wean as tolerated.  If patient continues to require O2 will to home O2 eval on discharge.  Tapered to oral steroids starting tomorrow.  Incentive spirometry  DuoNebs every 6 hourly  Started Hycodan given worsening cough as of this morning.  Patient reports Robitussin did not help at home.  Increase Tessalon Perles to 200 mg 3 times a day    Mixed hyperlipidemia  Continue with statins  Primary hypertension  Continue with hydrochlorothiazide 25 mg  Type 2 diabetes mellitus with diabetic polyneuropathy, with long-term current use of insulin ([facility])  Lab Results   Component Value Date    HGBA1C 7.8 (H) 01/16/2025       Recent Labs     01/17/25  1627 01/17/25  2030 01/18/25  0740 01/18/25  1111   POCGLU 311* 395* 205* 189*       Blood Sugar Average: Last 72 hrs:  (P) 278.375Hold oral hypoglycemic  Increase Lantus to 55 units at bedtime started lispro 10 units with meals  Due to IV steroids patient has uncontrolled sugars and will start lispro 10 units with meals  Insulin sliding scale  Hypoglycemia protocol    RSV bronchitis  Symptomatic management  Hypokalemia  P.o. KCl 40 mg x 1 given by ER  Will give another 80 mEq per oral  Morbidly obese (HCC)  Body mass index is 48.95 kg/m².    Recommend incorporating a more whole foods plant-predominant diet along with decreasing consumption of red meats and processed foods  Per AHA guidelines, recommend moderate-vigorous intensity exercise for 30 minutes a day for 5 days a week or a total of 150 min/week     VTE  Pharmacologic Prophylaxis:   Moderate Risk (Score 3-4) - Pharmacological DVT Prophylaxis Ordered: enoxaparin (Lovenox).    Mobility:   Basic Mobility Inpatient Raw Score: 18  JH-HLM Goal: 6: Walk 10 steps or more  JH-HLM Achieved: 7: Walk 25 feet or more  JH-HLM Goal achieved. Continue to encourage appropriate mobility.    Patient Centered Rounds: I performed bedside rounds with nursing staff today.   Discussions with Specialists or Other Care Team Provider: none    Education and Discussions with Family / Patient: Updated  () at bedside.    Current Length of Stay: 1 day(s)  Current Patient Status: Inpatient   Certification Statement: The patient will continue to require additional inpatient hospital stay due to acute respiratory failure  Discharge Plan: Anticipate discharge tomorrow to home.    Code Status: Level 1 - Full Code    Subjective   Slightly improvement compared to yesterday.  Decreasing cough spells with the help of the Hycodan.  Shortness of breath likely improving.    Objective :  Temp:  [97.9 °F (36.6 °C)-98.1 °F (36.7 °C)] 98.1 °F (36.7 °C)  HR:  [45-91] 60  BP: (119-175)/(54-81) 129/58  Resp:  [22] 22  SpO2:  [93 %-98 %] 93 %  O2 Device: Nasal cannula  Nasal Cannula O2 Flow Rate (L/min):  [2 L/min] 2 L/min    Body mass index is 48.95 kg/m².     Input and Output Summary (last 24 hours):     Intake/Output Summary (Last 24 hours) at 1/18/2025 1616  Last data filed at 1/18/2025 1200  Gross per 24 hour   Intake 910 ml   Output --   Net 910 ml       Physical Exam  Vitals and nursing note reviewed.   Constitutional:       General: She is not in acute distress.     Appearance: She is well-developed. She is obese.   HENT:      Head: Normocephalic and atraumatic.   Eyes:      Conjunctiva/sclera: Conjunctivae normal.   Cardiovascular:      Rate and Rhythm: Normal rate and regular rhythm.      Heart sounds: No murmur heard.  Pulmonary:      Effort: Pulmonary effort is normal. No respiratory  distress.      Breath sounds: Wheezing and rhonchi present.   Abdominal:      Palpations: Abdomen is soft.      Tenderness: There is no abdominal tenderness.   Musculoskeletal:         General: No swelling.      Cervical back: Neck supple.      Right lower leg: No edema.      Left lower leg: No edema.   Skin:     General: Skin is warm and dry.      Capillary Refill: Capillary refill takes less than 2 seconds.   Neurological:      Mental Status: She is alert.   Psychiatric:         Mood and Affect: Mood normal.           Lines/Drains:              Lab Results: I have reviewed the following results:   Results from last 7 days   Lab Units 01/18/25  0530   WBC Thousand/uL 10.92*   HEMOGLOBIN g/dL 13.6   HEMATOCRIT % 43.1   PLATELETS Thousands/uL 218   SEGS PCT % 87*   LYMPHO PCT % 8*   MONO PCT % 3*   EOS PCT % 1     Results from last 7 days   Lab Units 01/18/25  0530 01/16/25  1009   SODIUM mmol/L 141 140   POTASSIUM mmol/L 3.4* 3.0*   CHLORIDE mmol/L 102 103   CO2 mmol/L 31 26   BUN mg/dL 26* 18   CREATININE mg/dL 0.72 0.78   ANION GAP mmol/L 8 11   CALCIUM mg/dL 9.4 8.9   ALBUMIN g/dL  --  4.3   TOTAL BILIRUBIN mg/dL  --  0.68   ALK PHOS U/L  --  82   ALT U/L  --  24   AST U/L  --  25   GLUCOSE RANDOM mg/dL 218* 166*         Results from last 7 days   Lab Units 01/18/25  1111 01/18/25  0740 01/17/25  2030 01/17/25  1627 01/17/25  1152 01/17/25  0736 01/16/25  2112 01/16/25  1419   POC GLUCOSE mg/dl 189* 205* 395* 311* 281* 205* 422* 219*     Results from last 7 days   Lab Units 01/16/25  1426   HEMOGLOBIN A1C % 7.8*           Recent Cultures (last 7 days):         Imaging Results Review: No pertinent imaging studies reviewed.  Other Study Results Review: No additional pertinent studies reviewed.    Last 24 Hours Medication List:     Current Facility-Administered Medications:     albuterol (PROVENTIL HFA,VENTOLIN HFA) inhaler 2 puff, Q4H PRN    atorvastatin (LIPITOR) tablet 40 mg, Daily    benzonatate (TESSALON  PERLES) capsule 200 mg, TID    busPIRone (BUSPAR) tablet 5 mg, Daily    diazepam (VALIUM) tablet 5 mg, Q6H PRN    DULoxetine (CYMBALTA) delayed release capsule 60 mg, Daily    enoxaparin (LOVENOX) subcutaneous injection 40 mg, BID    gabapentin (NEURONTIN) capsule 600 mg, Daily    guaiFENesin (MUCINEX) 12 hr tablet 1,200 mg, Q12H KRISTINE    hydroCHLOROthiazide tablet 25 mg, Daily    HYDROcodone Bit-Homatrop MBr (HYCODAN) oral syrup 5 mL, Q4H PRN    insulin glargine (LANTUS) subcutaneous injection 50 Units 0.5 mL, HS    insulin lispro (HumALOG/ADMELOG) 100 units/mL subcutaneous injection 1-5 Units, TID AC **AND** [CANCELED] Fingerstick Glucose (POCT), TID AC    insulin lispro (HumALOG/ADMELOG) 100 units/mL subcutaneous injection 10 Units, TID With Meals    insulin lispro (HumALOG/ADMELOG) 100 units/mL subcutaneous injection 2-12 Units, HS    ipratropium (ATROVENT) 0.02 % inhalation solution 0.5 mg, TID    levalbuterol (XOPENEX) inhalation solution 1.25 mg, TID    loratadine (CLARITIN) tablet 10 mg, Daily    [START ON 1/19/2025] predniSONE tablet 40 mg, Daily    Insert peripheral IV, Once **AND** sodium chloride (PF) 0.9 % injection 3 mL, Q1H PRN    Administrative Statements   Today, Patient Was Seen By: Carolee Islas MD      **Please Note: This note may have been constructed using a voice recognition system.**

## 2025-01-19 VITALS
RESPIRATION RATE: 20 BRPM | HEIGHT: 62 IN | OXYGEN SATURATION: 95 % | WEIGHT: 267.64 LBS | SYSTOLIC BLOOD PRESSURE: 138 MMHG | BODY MASS INDEX: 49.25 KG/M2 | HEART RATE: 63 BPM | DIASTOLIC BLOOD PRESSURE: 66 MMHG | TEMPERATURE: 98 F

## 2025-01-19 LAB
ANION GAP SERPL CALCULATED.3IONS-SCNC: 6 MMOL/L (ref 4–13)
BUN SERPL-MCNC: 27 MG/DL (ref 5–25)
CALCIUM SERPL-MCNC: 9.1 MG/DL (ref 8.4–10.2)
CHLORIDE SERPL-SCNC: 104 MMOL/L (ref 96–108)
CO2 SERPL-SCNC: 34 MMOL/L (ref 21–32)
CREAT SERPL-MCNC: 0.68 MG/DL (ref 0.6–1.3)
GFR SERPL CREATININE-BSD FRML MDRD: 90 ML/MIN/1.73SQ M
GLUCOSE SERPL-MCNC: 111 MG/DL (ref 65–140)
GLUCOSE SERPL-MCNC: 91 MG/DL (ref 65–140)
POTASSIUM SERPL-SCNC: 3.2 MMOL/L (ref 3.5–5.3)
SODIUM SERPL-SCNC: 144 MMOL/L (ref 135–147)

## 2025-01-19 PROCEDURE — 82948 REAGENT STRIP/BLOOD GLUCOSE: CPT

## 2025-01-19 PROCEDURE — 99239 HOSP IP/OBS DSCHRG MGMT >30: CPT

## 2025-01-19 PROCEDURE — 94640 AIRWAY INHALATION TREATMENT: CPT

## 2025-01-19 PROCEDURE — 80048 BASIC METABOLIC PNL TOTAL CA: CPT

## 2025-01-19 PROCEDURE — 94760 N-INVAS EAR/PLS OXIMETRY 1: CPT

## 2025-01-19 RX ORDER — HYDROCODONE BITARTRATE AND HOMATROPINE METHYLBROMIDE ORAL SOLUTION 5; 1.5 MG/5ML; MG/5ML
5 LIQUID ORAL EVERY 4 HOURS PRN
Qty: 120 ML | Refills: 0 | Status: SHIPPED | OUTPATIENT
Start: 2025-01-19 | End: 2025-01-29

## 2025-01-19 RX ORDER — PREDNISONE 20 MG/1
40 TABLET ORAL DAILY
Qty: 8 TABLET | Refills: 0 | Status: SHIPPED | OUTPATIENT
Start: 2025-01-20 | End: 2025-01-24

## 2025-01-19 RX ORDER — BENZONATATE 200 MG/1
200 CAPSULE ORAL 3 TIMES DAILY
Qty: 20 CAPSULE | Refills: 0 | Status: SHIPPED | OUTPATIENT
Start: 2025-01-19 | End: 2025-01-26

## 2025-01-19 RX ORDER — GUAIFENESIN 1200 MG/1
1200 TABLET, EXTENDED RELEASE ORAL EVERY 12 HOURS SCHEDULED
Qty: 14 TABLET | Refills: 0 | Status: SHIPPED | OUTPATIENT
Start: 2025-01-19 | End: 2025-01-22 | Stop reason: SDUPTHER

## 2025-01-19 RX ORDER — POTASSIUM CHLORIDE 1500 MG/1
40 TABLET, EXTENDED RELEASE ORAL 2 TIMES DAILY
Status: DISCONTINUED | OUTPATIENT
Start: 2025-01-19 | End: 2025-01-19 | Stop reason: HOSPADM

## 2025-01-19 RX ADMIN — HYDROCHLOROTHIAZIDE 25 MG: 25 TABLET ORAL at 08:16

## 2025-01-19 RX ADMIN — INSULIN LISPRO 10 UNITS: 100 INJECTION, SOLUTION INTRAVENOUS; SUBCUTANEOUS at 09:33

## 2025-01-19 RX ADMIN — ATORVASTATIN CALCIUM 40 MG: 40 TABLET, FILM COATED ORAL at 08:16

## 2025-01-19 RX ADMIN — PREDNISONE 40 MG: 20 TABLET ORAL at 08:15

## 2025-01-19 RX ADMIN — POTASSIUM CHLORIDE 40 MEQ: 1500 TABLET, EXTENDED RELEASE ORAL at 08:16

## 2025-01-19 RX ADMIN — IPRATROPIUM BROMIDE 0.5 MG: 0.5 SOLUTION RESPIRATORY (INHALATION) at 08:08

## 2025-01-19 RX ADMIN — GUAIFENESIN 1200 MG: 600 TABLET ORAL at 08:15

## 2025-01-19 RX ADMIN — GABAPENTIN 600 MG: 300 CAPSULE ORAL at 08:16

## 2025-01-19 RX ADMIN — LORATADINE 10 MG: 10 TABLET ORAL at 08:15

## 2025-01-19 RX ADMIN — ENOXAPARIN SODIUM 40 MG: 40 INJECTION SUBCUTANEOUS at 08:16

## 2025-01-19 RX ADMIN — BUSPIRONE HYDROCHLORIDE 5 MG: 5 TABLET ORAL at 08:15

## 2025-01-19 RX ADMIN — BENZONATATE 200 MG: 100 CAPSULE ORAL at 08:15

## 2025-01-19 RX ADMIN — LEVALBUTEROL HYDROCHLORIDE 1.25 MG: 1.25 SOLUTION RESPIRATORY (INHALATION) at 08:08

## 2025-01-19 RX ADMIN — DULOXETINE HYDROCHLORIDE 60 MG: 60 CAPSULE, DELAYED RELEASE ORAL at 08:15

## 2025-01-19 NOTE — PLAN OF CARE
Problem: Potential for Falls  Goal: Patient will remain free of falls  Description: INTERVENTIONS:  - Educate patient/family on patient safety including physical limitations  - Instruct patient to call for assistance with activity   - Consult OT/PT to assist with strengthening/mobility   - Keep Call bell within reach  - Keep bed low and locked with side rails adjusted as appropriate  - Keep care items and personal belongings within reach  - Initiate and maintain comfort rounds  - Make Fall Risk Sign visible to staff  - Offer Toileting every 2 Hours, in advance of need  - Initiate/Maintain bed chair alarm  - Obtain necessary fall risk management equipment:   - Apply yellow socks and bracelet for high fall risk patients  - Consider moving patient to room near nurses station  Outcome: Adequate for Discharge     Problem: RESPIRATORY - ADULT  Goal: Achieves optimal ventilation and oxygenation  Description: INTERVENTIONS:  - Assess for changes in respiratory status  - Assess for changes in mentation and behavior  - Position to facilitate oxygenation and minimize respiratory effort  - Oxygen administered by appropriate delivery if ordered  - Initiate smoking cessation education as indicated  - Encourage broncho-pulmonary hygiene including cough, deep breathe, Incentive Spirometry  - Assess the need for suctioning and aspirate as needed  - Assess and instruct to report SOB or any respiratory difficulty  - Respiratory Therapy support as indicated  Outcome: Adequate for Discharge     Problem: PAIN - ADULT  Goal: Verbalizes/displays adequate comfort level or baseline comfort level  Description: Interventions:  - Encourage patient to monitor pain and request assistance  - Assess pain using appropriate pain scale  - Administer analgesics based on type and severity of pain and evaluate response  - Implement non-pharmacological measures as appropriate and evaluate response  - Consider cultural and social influences on pain and  pain management  - Notify physician/advanced practitioner if interventions unsuccessful or patient reports new pain  Outcome: Adequate for Discharge     Problem: INFECTION - ADULT  Goal: Absence or prevention of progression during hospitalization  Description: INTERVENTIONS:  - Assess and monitor for signs and symptoms of infection  - Monitor lab/diagnostic results  - Monitor all insertion sites, i.e. indwelling lines, tubes, and drains  - Monitor endotracheal if appropriate and nasal secretions for changes in amount and color  - Meredith appropriate cooling/warming therapies per order  - Administer medications as ordered  - Instruct and encourage patient and family to use good hand hygiene technique  - Identify and instruct in appropriate isolation precautions for identified infection/condition  Outcome: Adequate for Discharge     Problem: Prexisting or High Potential for Compromised Skin Integrity  Goal: Skin integrity is maintained or improved  Description: INTERVENTIONS:  - Identify patients at risk for skin breakdown  - Assess and monitor skin integrity  - Assess and monitor nutrition and hydration status  - Monitor labs   - Assess for incontinence   - Turn and reposition patient  - Assist with mobility/ambulation  - Relieve pressure over bony prominences  - Avoid friction and shearing  - Provide appropriate hygiene as needed including keeping skin clean and dry  - Evaluate need for skin moisturizer/barrier cream  - Collaborate with interdisciplinary team   - Patient/family teaching  - Consider wound care consult   Outcome: Adequate for Discharge

## 2025-01-20 ENCOUNTER — TRANSITIONAL CARE MANAGEMENT (OUTPATIENT)
Age: 70
End: 2025-01-20

## 2025-01-20 NOTE — ASSESSMENT & PLAN NOTE
RSV bronchitis  Chest x-ray did not show any infiltrate/pleural effusion  Plan:  Patient reports improvement in her symptoms.  Still reports shortness of breath and cough.  Patient weand off   If patient continues to require O2 will to home O2 eval on discharge.  Prednisone provided on dc for 4 more days.  Incentive spirometry  Recommended symptomatic care on discharge. Provided with Hycodan, Tessalon Perles to 200 mg 3 times a day and mucinex.   Recommended PCP follow up

## 2025-01-20 NOTE — DISCHARGE SUMMARY
Discharge Summary - Hospitalist   Name: Hattie Patton 69 y.o. female I MRN: [id number]  Unit/Bed#: -01 I Date of Admission: 1/16/2025   Date of Service: 1/19/2025 I Hospital Day: 2     Assessment & Plan  Acute hypoxic respiratory failure (HCC)  RSV bronchitis  Chest x-ray did not show any infiltrate/pleural effusion  Plan:  Patient reports improvement in her symptoms.  Still reports shortness of breath and cough.  Patient weand off   If patient continues to require O2 will to home O2 eval on discharge.  Prednisone provided on dc for 4 more days.  Incentive spirometry  Recommended symptomatic care on discharge. Provided with Hycodan, Tessalon Perles to 200 mg 3 times a day and mucinex.   Recommended PCP follow up    Mixed hyperlipidemia  Continue with statins  Primary hypertension  Continue with hydrochlorothiazide 25 mg  Type 2 diabetes mellitus with diabetic polyneuropathy, with long-term current use of insulin ([facility])  Lab Results   Component Value Date    HGBA1C 7.8 (H) 01/16/2025       Recent Labs     01/17/25  1627 01/17/25  2030 01/18/25  0740 01/18/25  1111   POCGLU 311* 395* 205* 189*       Blood Sugar Average: Last 72 hrs:  (P) 278.375Hold oral hypoglycemic  Increase Lantus to 55 units at bedtime started lispro 10 units with meals  Due to IV steroids patient has uncontrolled sugars and will start lispro 10 units with meals  Insulin sliding scale  Hypoglycemia protocol    RSV bronchitis  Symptomatic management  Hypokalemia  P.o. KCl 40 mg x 1 given by ER  Will give another 80 mEq per oral  Morbidly obese (HCC)  Body mass index is 48.95 kg/m².    Recommend incorporating a more whole foods plant-predominant diet along with decreasing consumption of red meats and processed foods  Per AHA guidelines, recommend moderate-vigorous intensity exercise for 30 minutes a day for 5 days a week or a total of 150 min/week      Medical Problems       Resolved Problems  Date Reviewed: 1/13/2025   None        Discharging Physician / Practitioner: Carolee Islas MD  PCP: Linda Patton DO  Admission Date:   Admission Orders (From admission, onward)       Ordered        01/17/25 0829  INPATIENT ADMISSION  Once            01/16/25 1232  Place in Observation  Once                          Discharge Date: 01/19/25    Consultations During Hospital Stay:  none    Procedures Performed:   none    Significant Findings / Test Results:   X-ray chest 1 view portable   Final Result by Tyson Mclaughlin MD (01/16 1151)      No acute cardiopulmonary disease.            Workstation performed: [id number]            Results Reviewed       Procedure Component Value Units Date/Time    Hemoglobin A1c w/EAG Estimation (Prechecked if no A1C within 90 days) [[id number]]  (Abnormal) Collected: 01/16/25 1426    Lab Status: Final result Specimen: Blood from Arm, Right Updated: 01/16/25 1910     Hemoglobin A1C 7.8 %       mg/dl     HS Troponin I 4hr [[id number]]  (Normal) Collected: 01/16/25 1426    Lab Status: Final result Specimen: Blood from Arm, Right Updated: 01/16/25 1457     hs TnI 4hr 12 ng/L      Delta 4hr hsTnI -4 ng/L     Platelet count [[id number]]  (Normal) Collected: 01/16/25 1426    Lab Status: Final result Specimen: Blood from Arm, Right Updated: 01/16/25 1431     Platelets 207 Thousands/uL      MPV 9.8 fL     Fingerstick Glucose (POCT) [[id number]]  (Abnormal) Collected: 01/16/25 1419    Lab Status: Final result Specimen: Blood Updated: 01/16/25 1420     POC Glucose 219 mg/dl     HS Troponin I 2hr [[id number]]  (Normal) Collected: 01/16/25 1209    Lab Status: Final result Specimen: Blood from Arm, Right Updated: 01/16/25 1237     hs TnI 2hr 12 ng/L      Delta 2hr hsTnI -4 ng/L     FLU/RSV/COVID - if FLU/RSV clinically relevant (2hr TAT) [[id number]]  (Abnormal) Collected: 01/16/25 1009    Lab Status: Final result Specimen: Nares from Nose Updated: 01/16/25 1101     SARS-CoV-2 Negative     INFLUENZA A PCR Negative      INFLUENZA B PCR Negative     RSV PCR Positive    Narrative:      This test has been performed using the CoV-2/Flu/RSV plus assay on the [employer] platform. This test has been validated by the  and verified by the performing laboratory.     This test is designed to amplify and detect the following: nucleocapsid (N), envelope (E), and RNA-dependent RNA polymerase (RdRP) genes of the SARS-CoV-2 genome; matrix (M), basic polymerase (PB2), and acidic protein (PA) segments of the influenza A genome; matrix (M) and non-structural protein (NS) segments of the influenza B genome, and the nucleocapsid genes of RSV A and RSV B.     Positive results are indicative of the presence of Flu A, Flu B, RSV, and/or SARS-CoV-2 RNA. Positive results for SARS-CoV-2 or suspected novel influenza should be reported to state, local, or federal health departments according to local reporting requirements.      All results should be assessed in conjunction with clinical presentation and other laboratory markers for clinical management.     FOR PEDIATRIC PATIENTS - copy/paste COVID Guidelines URL to browser: [email or web address]       HS Troponin 0hr (reflex protocol) [[id number]]  (Normal) Collected: 01/16/25 1009    Lab Status: Final result Specimen: Blood from Arm, Right Updated: 01/16/25 1039     hs TnI 0hr 16 ng/L     Comprehensive metabolic panel [[id number]]  (Abnormal) Collected: 01/16/25 1009    Lab Status: Final result Specimen: Blood from Arm, Right Updated: 01/16/25 1032     Sodium 140 mmol/L      Potassium 3.0 mmol/L      Chloride 103 mmol/L      CO2 26 mmol/L      ANION GAP 11 mmol/L      BUN 18 mg/dL      Creatinine 0.78 mg/dL      Glucose 166 mg/dL      Calcium 8.9 mg/dL      AST 25 U/L      ALT 24 U/L      Alkaline Phosphatase 82 U/L      Total Protein 7.5 g/dL      Albumin 4.3 g/dL      Total Bilirubin 0.68 mg/dL      eGFR 78 ml/min/1.73sq m      Narrative:      National Kidney Disease Foundation guidelines for Chronic Kidney Disease (CKD):     Stage 1 with normal or high GFR (GFR > 90 mL/min/1.73 square meters)    Stage 2 Mild CKD (GFR = 60-89 mL/min/1.73 square meters)    Stage 3A Moderate CKD (GFR = 45-59 mL/min/1.73 square meters)    Stage 3B Moderate CKD (GFR = 30-44 mL/min/1.73 square meters)    Stage 4 Severe CKD (GFR = 15-29 mL/min/1.73 square meters)    Stage 5 End Stage CKD (GFR <15 mL/min/1.73 square meters)  Note: GFR calculation is accurate only with a steady state creatinine    CBC and differential [[id number]]  (Abnormal) Collected: 01/16/25 1009    Lab Status: Final result Specimen: Blood from Arm, Right Updated: 01/16/25 1018     WBC 8.51 Thousand/uL      RBC 5.62 Million/uL      Hemoglobin 15.5 g/dL      Hematocrit 48.5 %      MCV 86 fL      MCH 27.6 pg      MCHC 32.0 g/dL      RDW 14.7 %      MPV 10.2 fL      Platelets 245 Thousands/uL      nRBC 0 /100 WBCs      Segmented % 61 %      Immature Grans % 1 %      Lymphocytes % 24 %      Monocytes % 11 %      Eosinophils Relative 2 %      Basophils Relative 1 %      Absolute Neutrophils 5.26 Thousands/µL      Absolute Immature Grans 0.04 Thousand/uL      Absolute Lymphocytes 2.05 Thousands/µL      Absolute Monocytes 0.96 Thousand/µL      Eosinophils Absolute 0.16 Thousand/µL      Basophils Absolute 0.04 Thousands/µL              Incidental Findings:   none       Test Results Pending at Discharge (will require follow up):   none     Outpatient Tests Requested:  none    Complications:  none    Reason for Admission: Shortness of breath, cough with difficulty breathing    Hospital Course:   Hattie Patton is a 69 y.o. female patient who originally presented to the hospital on 1/16/2025 due to progressive shortness of breath, cough. Patient was found to be hypoxic on admission, Patient tested positive for RSV, Patient was started on symptomatic care with IV steroids and nebulizes with improvement in  "hypoxia. For the cough patient found relief from hycodan. Given patient back to room air was deemed stable for discharge. Was provided with symptomatic management on dc.           Please see above list of diagnoses and related plan for additional information.     Condition at Discharge: good    Discharge Day Visit / Exam:   Subjective:  Patient reports in improvement in cough and dyspnea. Patient feels better and feels ready to be dc.  Vitals: Blood Pressure: 138/66 (01/19/25 0740)  Pulse: 63 (01/19/25 0740)  Temperature: 98 °F (36.7 °C) (01/19/25 0740)  Temp Source: Oral (01/16/25 2205)  Respirations: 20 (01/19/25 0740)  Height: 5' 2\" (157.5 cm) (01/16/25 1926)  Weight - Scale: 121 kg (267 lb 10.2 oz) (01/16/25 1926)  SpO2: 95 % (01/19/25 0809)  Physical Exam  Vitals and nursing note reviewed.   Constitutional:       General: She is not in acute distress.     Appearance: She is well-developed. She is obese.   HENT:      Head: Normocephalic and atraumatic.   Eyes:      Conjunctiva/sclera: Conjunctivae normal.   Cardiovascular:      Rate and Rhythm: Normal rate and regular rhythm.      Heart sounds: No murmur heard.  Pulmonary:      Effort: Pulmonary effort is normal. No respiratory distress.      Breath sounds: Rhonchi present. No wheezing or rales.   Abdominal:      General: Bowel sounds are normal.      Palpations: Abdomen is soft.      Tenderness: There is no abdominal tenderness.   Musculoskeletal:         General: No swelling.      Cervical back: Neck supple.      Right lower leg: No edema.      Left lower leg: No edema.   Skin:     General: Skin is warm and dry.      Capillary Refill: Capillary refill takes less than 2 seconds.   Neurological:      Mental Status: She is alert.   Psychiatric:         Mood and Affect: Mood normal.        Discussion with Family: Patient declined call to .     Discharge instructions/Information to patient and family:   See after visit summary for information provided " to patient and family.      Provisions for Follow-Up Care:  See after visit summary for information related to follow-up care and any pertinent home health orders.      Mobility at time of Discharge:   Basic Mobility Inpatient Raw Score: 18  JH-HLM Goal: 6: Walk 10 steps or more  JH-HLM Achieved: 7: Walk 25 feet or more  HLM Goal achieved. Continue to encourage appropriate mobility.     Disposition:   Home    Planned Readmission: none    Discharge Medications:  See after visit summary for reconciled discharge medications provided to patient and/or family.      Administrative Statements   Discharge Statement:  I have spent a total time of 60 minutes in caring for this patient on the day of the visit/encounter. >30 minutes of time was spent on: Diagnostic results, Prognosis, Risks and benefits of tx options, Instructions for management, Patient and family education, Importance of tx compliance, Risk factor reductions, Impressions, Counseling / Coordination of care, Documenting in the medical record, and Reviewing / ordering tests, medicine, procedures  .    **Please Note: This note may have been constructed using a voice recognition system**

## 2025-01-22 ENCOUNTER — OFFICE VISIT (OUTPATIENT)
Age: 70
End: 2025-01-22
Payer: MEDICARE

## 2025-01-22 VITALS
HEIGHT: 62 IN | OXYGEN SATURATION: 96 % | BODY MASS INDEX: 49.76 KG/M2 | SYSTOLIC BLOOD PRESSURE: 140 MMHG | HEART RATE: 71 BPM | DIASTOLIC BLOOD PRESSURE: 70 MMHG | WEIGHT: 270.4 LBS | RESPIRATION RATE: 20 BRPM | TEMPERATURE: 97.7 F

## 2025-01-22 DIAGNOSIS — E11.42 TYPE 2 DIABETES MELLITUS WITH DIABETIC POLYNEUROPATHY, WITH LONG-TERM CURRENT USE OF INSULIN (HCC): ICD-10-CM

## 2025-01-22 DIAGNOSIS — I10 PRIMARY HYPERTENSION: ICD-10-CM

## 2025-01-22 DIAGNOSIS — Z79.4 TYPE 2 DIABETES MELLITUS WITH DIABETIC POLYNEUROPATHY, WITH LONG-TERM CURRENT USE OF INSULIN (HCC): ICD-10-CM

## 2025-01-22 DIAGNOSIS — J20.5 RSV BRONCHITIS: Primary | ICD-10-CM

## 2025-01-22 PROCEDURE — 99495 TRANSJ CARE MGMT MOD F2F 14D: CPT | Performed by: FAMILY MEDICINE

## 2025-01-22 RX ORDER — FLUTICASONE PROPIONATE 50 MCG
1 SPRAY, SUSPENSION (ML) NASAL
Qty: 11.1 ML | Refills: 0 | Status: SHIPPED | OUTPATIENT
Start: 2025-01-22

## 2025-01-22 RX ORDER — GUAIFENESIN 1200 MG/1
1200 TABLET, EXTENDED RELEASE ORAL EVERY 12 HOURS SCHEDULED
Qty: 28 TABLET | Refills: 0 | Status: SHIPPED | OUTPATIENT
Start: 2025-01-22 | End: 2025-02-05

## 2025-01-22 RX ORDER — LISINOPRIL 5 MG/1
5 TABLET ORAL DAILY
Qty: 30 TABLET | Refills: 1 | Status: SHIPPED | OUTPATIENT
Start: 2025-01-22

## 2025-01-22 NOTE — ASSESSMENT & PLAN NOTE
Fair control. Blood sugars elevated due to steroid use. Pt to increase basal therapy.   Lab Results   Component Value Date    HGBA1C 7.8 (H) 01/16/2025

## 2025-01-22 NOTE — PROGRESS NOTES
Transition of Care Visit  Name: Hatite Patton      : 1955      MRN: [id number]  Encounter Provider: Linda Patton DO  Encounter Date: 2025   Encounter department: [facility] [place]    Assessment & Plan  RSV bronchitis  Slow resolution. Pt to finish prednisone taper. Use albuterol q4 hours for wheezing. Flonase to help with PND and rhinitis   Orders:    fluticasone (FLONASE) 50 mcg/act nasal spray; 1 spray into each nostril daily at bedtime    Type 2 diabetes mellitus with diabetic polyneuropathy, with long-term current use of insulin (HCC)  Fair control. Blood sugars elevated due to steroid use. Pt to increase basal therapy.   Lab Results   Component Value Date    HGBA1C 7.8 (H) 2025            Primary hypertension  HCTZ no longer appropriate and wasn't working well for monotherapy any longer. Will switch to ACE.   Orders:    lisinopril (ZESTRIL) 5 mg tablet; Take 1 tablet (5 mg total) by mouth daily         History of Present Illness     Transitional Care Management Review:   Hattie Patton is a 69 y.o. female here for TCM follow up.     During the TCM phone call patient stated:  TCM Call       Date and time call was made  2025  1:18 PM    Hospital care reviewed  Records reviewed    Patient was hospitialized at  [facility]    Date of Admission  25    Date of discharge  25    Diagnosis  respitory failure -    Disposition  Home    Were the patients medications reviewed and updated  Yes    Current Symptoms  Cough; Shortness of breath    Cough Severity  Mild    Shortness of breath severity  Mild          TCM Call       Post hospital issues  None    Should patient be enrolled in anticoag monitoring?  No    Scheduled for follow up?  Yes    Did you obtain your prescribed medications  Yes    Do you need help managing your prescriptions or medications  No    Is transportation to your appointment needed  No    I have advised the patient to call PCP  "with any new or worsening symptoms  Mary Horn- admin    Living Arrangements  Spouse or Significiant other    Are you recieving any outpatient services  No    Are you recieving home care services  No    Are you using any community resources  No    Current waiver services  No    Have you fallen in the last 12 months  No    Interperter language line needed  No    Counseling  Patient    Counseling topics  patient and family education    Comments  wants to speak to dr about HCTZ and her sugars          Pt presents for hospital f/u  Reports feeling a little bit better but still SOB   Using mucinex whtihc is helping with her cough      Review of Systems   Constitutional:  Positive for fatigue. Negative for fever.   Respiratory:  Positive for cough, shortness of breath and wheezing.    Musculoskeletal:  Positive for gait problem.     Objective   /70 (BP Location: Left arm, Patient Position: Sitting, Cuff Size: Standard)   Pulse 71   Temp 97.7 °F (36.5 °C) (Tympanic)   Resp 20   Ht 5' 2\" (1.575 m)   Wt 123 kg (270 lb 6.4 oz)   SpO2 96%   BMI 49.46 kg/m²     Physical Exam  HENT:      Head: Normocephalic.   Cardiovascular:      Rate and Rhythm: Normal rate and regular rhythm.   Pulmonary:      Breath sounds: Examination of the right-upper field reveals wheezing. Examination of the left-upper field reveals wheezing. Examination of the right-middle field reveals wheezing. Examination of the left-middle field reveals wheezing. Examination of the right-lower field reveals decreased breath sounds and wheezing. Examination of the left-lower field reveals decreased breath sounds and wheezing. Decreased breath sounds and wheezing present.   Musculoskeletal:      Right lower leg: No edema.      Left lower leg: No edema.   Neurological:      Mental Status: She is alert and oriented to person, place, and time.      Gait: Gait abnormal.   Psychiatric:         Mood and Affect: Mood is anxious.       Medications have been " reviewed by provider in current encounter

## 2025-01-22 NOTE — ASSESSMENT & PLAN NOTE
Slow resolution. Pt to finish prednisone taper. Use albuterol q4 hours for wheezing. Flonase to help with PND and rhinitis   Orders:    fluticasone (FLONASE) 50 mcg/act nasal spray; 1 spray into each nostril daily at bedtime

## 2025-01-22 NOTE — ASSESSMENT & PLAN NOTE
HCTZ no longer appropriate and wasn't working well for monotherapy any longer. Will switch to ACE.   Orders:    lisinopril (ZESTRIL) 5 mg tablet; Take 1 tablet (5 mg total) by mouth daily

## 2025-01-23 DIAGNOSIS — F32.A ANXIETY AND DEPRESSION: ICD-10-CM

## 2025-01-23 DIAGNOSIS — F41.9 ANXIETY AND DEPRESSION: ICD-10-CM

## 2025-01-23 RX ORDER — BUSPIRONE HYDROCHLORIDE 5 MG/1
5 TABLET ORAL 2 TIMES DAILY
Qty: 60 TABLET | Refills: 5 | Status: SHIPPED | OUTPATIENT
Start: 2025-01-23

## 2025-02-10 DIAGNOSIS — E11.42 TYPE 2 DIABETES MELLITUS WITH DIABETIC POLYNEUROPATHY, WITH LONG-TERM CURRENT USE OF INSULIN (HCC): ICD-10-CM

## 2025-02-10 DIAGNOSIS — Z79.4 TYPE 2 DIABETES MELLITUS WITH DIABETIC POLYNEUROPATHY, WITH LONG-TERM CURRENT USE OF INSULIN (HCC): ICD-10-CM

## 2025-02-11 ENCOUNTER — RA CDI HCC (OUTPATIENT)
Dept: OTHER | Facility: HOSPITAL | Age: 70
End: 2025-02-11

## 2025-02-11 RX ORDER — INSULIN GLARGINE 300 U/ML
50 INJECTION, SOLUTION SUBCUTANEOUS
Qty: 12 ML | Refills: 1 | Status: SHIPPED | OUTPATIENT
Start: 2025-02-11

## 2025-02-15 PROBLEM — J20.5 RSV BRONCHITIS: Status: RESOLVED | Noted: 2025-01-16 | Resolved: 2025-02-15

## 2025-02-17 DIAGNOSIS — I10 PRIMARY HYPERTENSION: ICD-10-CM

## 2025-02-18 ENCOUNTER — OFFICE VISIT (OUTPATIENT)
Age: 70
End: 2025-02-18
Payer: MEDICARE

## 2025-02-18 VITALS
HEIGHT: 62 IN | TEMPERATURE: 98.4 F | BODY MASS INDEX: 53.37 KG/M2 | DIASTOLIC BLOOD PRESSURE: 70 MMHG | OXYGEN SATURATION: 98 % | WEIGHT: 290 LBS | HEART RATE: 68 BPM | RESPIRATION RATE: 16 BRPM | SYSTOLIC BLOOD PRESSURE: 128 MMHG

## 2025-02-18 DIAGNOSIS — M79.89 MASS OF SOFT TISSUE OF LOWER LEG: ICD-10-CM

## 2025-02-18 DIAGNOSIS — Z79.4 TYPE 2 DIABETES MELLITUS WITH DIABETIC POLYNEUROPATHY, WITH LONG-TERM CURRENT USE OF INSULIN (HCC): ICD-10-CM

## 2025-02-18 DIAGNOSIS — M79.604 BILATERAL LEG AND FOOT PAIN: ICD-10-CM

## 2025-02-18 DIAGNOSIS — E66.01 MORBIDLY OBESE (HCC): ICD-10-CM

## 2025-02-18 DIAGNOSIS — J30.2 SEASONAL ALLERGIES: Primary | ICD-10-CM

## 2025-02-18 DIAGNOSIS — E11.42 TYPE 2 DIABETES MELLITUS WITH DIABETIC POLYNEUROPATHY, WITH LONG-TERM CURRENT USE OF INSULIN (HCC): ICD-10-CM

## 2025-02-18 DIAGNOSIS — M79.671 BILATERAL LEG AND FOOT PAIN: ICD-10-CM

## 2025-02-18 DIAGNOSIS — M79.672 BILATERAL LEG AND FOOT PAIN: ICD-10-CM

## 2025-02-18 DIAGNOSIS — I10 PRIMARY HYPERTENSION: Primary | ICD-10-CM

## 2025-02-18 DIAGNOSIS — Z12.11 COLON CANCER SCREENING: ICD-10-CM

## 2025-02-18 DIAGNOSIS — M79.605 BILATERAL LEG AND FOOT PAIN: ICD-10-CM

## 2025-02-18 PROBLEM — J96.01 ACUTE HYPOXIC RESPIRATORY FAILURE (HCC): Status: RESOLVED | Noted: 2024-02-13 | Resolved: 2025-02-18

## 2025-02-18 PROCEDURE — G2211 COMPLEX E/M VISIT ADD ON: HCPCS | Performed by: FAMILY MEDICINE

## 2025-02-18 PROCEDURE — 99214 OFFICE O/P EST MOD 30 MIN: CPT | Performed by: FAMILY MEDICINE

## 2025-02-18 RX ORDER — LISINOPRIL 5 MG/1
5 TABLET ORAL DAILY
Qty: 30 TABLET | Refills: 5 | Status: SHIPPED | OUTPATIENT
Start: 2025-02-18 | End: 2025-02-18 | Stop reason: SDUPTHER

## 2025-02-18 RX ORDER — LISINOPRIL 5 MG/1
5 TABLET ORAL DAILY
Qty: 90 TABLET | Refills: 2 | Status: SHIPPED | OUTPATIENT
Start: 2025-02-18

## 2025-02-18 RX ORDER — TIRZEPATIDE 2.5 MG/.5ML
2.5 INJECTION, SOLUTION SUBCUTANEOUS WEEKLY
Qty: 2 ML | Refills: 0 | Status: SHIPPED | OUTPATIENT
Start: 2025-02-18

## 2025-02-18 NOTE — PROGRESS NOTES
"Name: Hattie Patton      : 1955      MRN: [id number]  Encounter Provider: Linda Patton DO  Encounter Date: 2025   Encounter department: [facility] PRIMARY C[facility] [place]  :  Assessment & Plan  Primary hypertension  Unable to tolerate HCTZ.  Will DC and add ACE therapy  Orders:    lisinopril (ZESTRIL) 5 mg tablet; Take 1 tablet (5 mg total) by mouth daily    Type 2 diabetes mellitus with diabetic polyneuropathy, with long-term current use of insulin (HCC)  A1c still not at goal.  Patient on glipizide and insulin q. nightly.  Will add GLP-1 agonist  Lab Results   Component Value Date    HGBA1C 7.8 (H) 2025       Orders:    Tirzepatide (Mounjaro) 2.5 MG/0.5ML SOAJ; Inject 2.5 mg under the skin once a week    Bilateral leg and foot pain  Persistent lower extremity pain with activity.  Alleviated with rest.  Denies paresthesias.  Will evaluate for peripheral arterial disease  Orders:    VAS PIYUSH and waveform analysis, multiple levels; Future    Mass of soft tissue of lower leg  Probable lipoma within the left popliteal fossa.  Will evaluate with imaging further and patient will consider removal  Orders:    US MSK limited; Future    Morbidly obese (HCC)    Patient has made dietary changes.  Unable to exercise intensively due to chronic pain.  Has been making more efforts to move around the house.  Is appropriate for GLP-1 agonist see above       Colon cancer screening  Due for screening  Orders:    Cologuard           History of Present Illness   Patient presents for follow-up.          Objective   /70 (BP Location: Right arm, Patient Position: Sitting, Cuff Size: Large)   Pulse 68   Temp 98.4 °F (36.9 °C) (Tympanic)   Resp 16   Ht 5' 2\" (1.575 m)   Wt 132 kg (290 lb)   SpO2 98%   BMI 53.04 kg/m²      Physical Exam  Constitutional:       Appearance: She is obese.   HENT:      Head: Normocephalic.      Right Ear: External ear normal.      Left Ear: External ear normal. "   Eyes:      Conjunctiva/sclera: Conjunctivae normal.   Cardiovascular:      Rate and Rhythm: Normal rate and regular rhythm.   Abdominal:      General: Bowel sounds are normal.      Palpations: Abdomen is soft.   Musculoskeletal:      Comments: Compressible mobile, nontender enlargement near the left popliteal fossa   Neurological:      Mental Status: She is alert and oriented to person, place, and time.      Gait: Gait abnormal.   Psychiatric:         Attention and Perception: Attention normal.         Mood and Affect: Mood is anxious. Affect is labile.         Behavior: Behavior is cooperative.

## 2025-02-18 NOTE — ASSESSMENT & PLAN NOTE
Unable to tolerate HCTZ.  Will DC and add ACE therapy  Orders:    lisinopril (ZESTRIL) 5 mg tablet; Take 1 tablet (5 mg total) by mouth daily

## 2025-02-18 NOTE — ASSESSMENT & PLAN NOTE
A1c still not at goal.  Patient on glipizide and insulin q. nightly.  Will add GLP-1 agonist  Lab Results   Component Value Date    HGBA1C 7.8 (H) 01/16/2025       Orders:    Tirzepatide (Mounjaro) 2.5 MG/0.5ML SOAJ; Inject 2.5 mg under the skin once a week

## 2025-02-19 RX ORDER — FLUTICASONE PROPIONATE 50 MCG
SPRAY, SUSPENSION (ML) NASAL
Qty: 16 ML | Refills: 3 | Status: SHIPPED | OUTPATIENT
Start: 2025-02-19

## 2025-02-24 NOTE — ASSESSMENT & PLAN NOTE
Patient has made dietary changes.  Unable to exercise intensively due to chronic pain.  Has been making more efforts to move around the house.  Is appropriate for GLP-1 agonist see above

## 2025-02-25 ENCOUNTER — HOSPITAL ENCOUNTER (OUTPATIENT)
Dept: ULTRASOUND IMAGING | Facility: CLINIC | Age: 70
Discharge: HOME | End: 2025-02-25
Payer: MEDICARE

## 2025-02-25 DIAGNOSIS — M79.89 MASS OF SOFT TISSUE OF LOWER LEG: ICD-10-CM

## 2025-02-25 PROCEDURE — 76882 US LMTD JT/FCL EVL NVASC XTR: CPT

## 2025-03-01 ENCOUNTER — PATIENT MESSAGE (OUTPATIENT)
Age: 70
End: 2025-03-01

## 2025-03-01 DIAGNOSIS — E11.42 TYPE 2 DIABETES MELLITUS WITH DIABETIC POLYNEUROPATHY, WITH LONG-TERM CURRENT USE OF INSULIN (HCC): Primary | ICD-10-CM

## 2025-03-01 DIAGNOSIS — Z79.4 TYPE 2 DIABETES MELLITUS WITH DIABETIC POLYNEUROPATHY, WITH LONG-TERM CURRENT USE OF INSULIN (HCC): Primary | ICD-10-CM

## 2025-03-03 ENCOUNTER — RESULTS FOLLOW-UP (OUTPATIENT)
Age: 70
End: 2025-03-03

## 2025-03-03 RX ORDER — INSULIN GLARGINE 100 [IU]/ML
50 INJECTION, SOLUTION SUBCUTANEOUS
Qty: 15 ML | Refills: 1 | Status: SHIPPED | OUTPATIENT
Start: 2025-03-03 | End: 2025-03-14 | Stop reason: SDUPTHER

## 2025-03-05 ENCOUNTER — RESULTS FOLLOW-UP (OUTPATIENT)
Age: 70
End: 2025-03-05

## 2025-03-05 DIAGNOSIS — R19.5 POSITIVE COLORECTAL CANCER SCREENING USING COLOGUARD TEST: Primary | ICD-10-CM

## 2025-03-05 LAB — COLOGUARD RESULT REPORTABLE: POSITIVE

## 2025-03-05 NOTE — TELEPHONE ENCOUNTER
----- Message from Linda Patton DO sent at 3/5/2025 11:47 AM EST -----  Please notify patient that her Cologuard colon cancer screening was positive.  At this time I recommend a follow-up colonoscopy for further evaluation.  I have placed referral to gastroenterology

## 2025-03-05 NOTE — TELEPHONE ENCOUNTER
Informed pt, she stated she will not do the colonoscopy if it involves drinking the substance for the prep the day before she asked if there was another option and I advised pt schedule a consult with GI and go over options with GI

## 2025-03-13 DIAGNOSIS — Z79.4 TYPE 2 DIABETES MELLITUS WITH DIABETIC POLYNEUROPATHY, WITH LONG-TERM CURRENT USE OF INSULIN (HCC): ICD-10-CM

## 2025-03-13 DIAGNOSIS — E11.42 TYPE 2 DIABETES MELLITUS WITH DIABETIC POLYNEUROPATHY, WITH LONG-TERM CURRENT USE OF INSULIN (HCC): ICD-10-CM

## 2025-03-13 RX ORDER — TIRZEPATIDE 2.5 MG/.5ML
2.5 INJECTION, SOLUTION SUBCUTANEOUS WEEKLY
Qty: 2 ML | Refills: 0 | Status: CANCELLED | OUTPATIENT
Start: 2025-03-13

## 2025-03-14 DIAGNOSIS — Z79.4 TYPE 2 DIABETES MELLITUS WITH DIABETIC POLYNEUROPATHY, WITH LONG-TERM CURRENT USE OF INSULIN (HCC): ICD-10-CM

## 2025-03-14 DIAGNOSIS — E11.42 TYPE 2 DIABETES MELLITUS WITH DIABETIC POLYNEUROPATHY, WITH LONG-TERM CURRENT USE OF INSULIN (HCC): ICD-10-CM

## 2025-03-14 RX ORDER — TIRZEPATIDE 5 MG/.5ML
5 INJECTION, SOLUTION SUBCUTANEOUS WEEKLY
Qty: 2 ML | Refills: 0 | Status: SHIPPED | OUTPATIENT
Start: 2025-03-14

## 2025-03-14 RX ORDER — INSULIN GLARGINE 100 [IU]/ML
50 INJECTION, SOLUTION SUBCUTANEOUS
Qty: 15 ML | Refills: 0 | Status: SHIPPED | OUTPATIENT
Start: 2025-03-14

## 2025-03-19 ENCOUNTER — PATIENT MESSAGE (OUTPATIENT)
Age: 70
End: 2025-03-19

## 2025-03-20 ENCOUNTER — PATIENT MESSAGE (OUTPATIENT)
Age: 70
End: 2025-03-20

## 2025-03-20 DIAGNOSIS — Z79.4 TYPE 2 DIABETES MELLITUS WITH DIABETIC POLYNEUROPATHY, WITH LONG-TERM CURRENT USE OF INSULIN (HCC): Primary | ICD-10-CM

## 2025-03-20 DIAGNOSIS — E11.42 TYPE 2 DIABETES MELLITUS WITH DIABETIC POLYNEUROPATHY, WITH LONG-TERM CURRENT USE OF INSULIN (HCC): Primary | ICD-10-CM

## 2025-03-20 RX ORDER — PEN NEEDLE, DIABETIC 32GX 5/32"
NEEDLE, DISPOSABLE MISCELLANEOUS
Qty: 100 EACH | Refills: 3 | Status: SHIPPED | OUTPATIENT
Start: 2025-03-20

## 2025-03-28 ENCOUNTER — PATIENT MESSAGE (OUTPATIENT)
Age: 70
End: 2025-03-28

## 2025-03-28 DIAGNOSIS — Z79.4 TYPE 2 DIABETES MELLITUS WITH DIABETIC POLYNEUROPATHY, WITH LONG-TERM CURRENT USE OF INSULIN (HCC): Primary | ICD-10-CM

## 2025-03-28 DIAGNOSIS — E11.42 TYPE 2 DIABETES MELLITUS WITH DIABETIC POLYNEUROPATHY, WITH LONG-TERM CURRENT USE OF INSULIN (HCC): Primary | ICD-10-CM

## 2025-04-01 RX ORDER — INSULIN GLARGINE 100 [IU]/ML
50 INJECTION, SOLUTION SUBCUTANEOUS
Qty: 15 ML | Refills: 1 | Status: SHIPPED | OUTPATIENT
Start: 2025-04-01

## 2025-04-11 ENCOUNTER — TELEPHONE (OUTPATIENT)
Age: 70
End: 2025-04-11

## 2025-04-11 DIAGNOSIS — E11.42 TYPE 2 DIABETES MELLITUS WITH DIABETIC POLYNEUROPATHY, WITH LONG-TERM CURRENT USE OF INSULIN (HCC): ICD-10-CM

## 2025-04-11 DIAGNOSIS — Z79.4 TYPE 2 DIABETES MELLITUS WITH DIABETIC POLYNEUROPATHY, WITH LONG-TERM CURRENT USE OF INSULIN (HCC): ICD-10-CM

## 2025-04-11 DIAGNOSIS — M47.816 LUMBAR FACET ARTHROPATHY: ICD-10-CM

## 2025-04-11 RX ORDER — GABAPENTIN 300 MG/1
600 CAPSULE ORAL DAILY
Qty: 180 CAPSULE | Refills: 0 | Status: SHIPPED | OUTPATIENT
Start: 2025-04-11

## 2025-04-11 RX ORDER — TIRZEPATIDE 5 MG/.5ML
5 INJECTION, SOLUTION SUBCUTANEOUS WEEKLY
Qty: 2 ML | Refills: 0 | Status: CANCELLED | OUTPATIENT
Start: 2025-04-11

## 2025-04-11 RX ORDER — TIRZEPATIDE 7.5 MG/.5ML
7.5 INJECTION, SOLUTION SUBCUTANEOUS WEEKLY
Qty: 2 ML | Refills: 0 | Status: SHIPPED | OUTPATIENT
Start: 2025-04-11 | End: 2025-04-16

## 2025-04-11 RX ORDER — TIRZEPATIDE 7.5 MG/.5ML
7.5 INJECTION, SOLUTION SUBCUTANEOUS WEEKLY
Refills: 0 | OUTPATIENT
Start: 2025-04-11

## 2025-04-14 RX ORDER — TIRZEPATIDE 7.5 MG/.5ML
7.5 INJECTION, SOLUTION SUBCUTANEOUS WEEKLY
Refills: 0 | OUTPATIENT
Start: 2025-04-14

## 2025-04-14 NOTE — TELEPHONE ENCOUNTER
PA for (Zepbound) 7.5 mg/0.5 mL auto-injector SUBMITTED to     via    []CMM-KEY:   [x]Surescripts-Case ID # [id number]   []Availity-Auth ID # NDC #   []Faxed to plan   []Other website   []Phone call Case ID #     [x]PA sent as URGENT    All office notes, labs and other pertaining documents and studies sent. Clinical questions answered. Awaiting determination from insurance company.     Turnaround time for your insurance to make a decision on your Prior Authorization can take 7-21 business days.

## 2025-04-14 NOTE — TELEPHONE ENCOUNTER
PA for Zepbound) 7.5 mg/0.5 mL auto-injector  EXCLUDED from plan       Reason:(Screenshot if applicable)        Message sent to office clinical pool Yes    Appeal is not warranted unless the following is met:    DX of EVAN and the below cardiac events:   You have established cardiovascular disease as evidenced by one of the following:    (1) Prior myocardial infarction    (2) Prior ischemic or hemorrhagic stroke    (3) Symptomatic peripheral arterial disease evidenced by one of the following:     (A) Intermittent claudication with ankle-brachial index less than 0.85 (at rest)     (B) Peripheral arterial revascularization procedure     (C) Amputation due to atherosclerotic disease

## 2025-04-15 ENCOUNTER — PATIENT MESSAGE (OUTPATIENT)
Age: 70
End: 2025-04-15

## 2025-04-15 DIAGNOSIS — E11.42 TYPE 2 DIABETES MELLITUS WITH DIABETIC POLYNEUROPATHY, WITH LONG-TERM CURRENT USE OF INSULIN (HCC): ICD-10-CM

## 2025-04-15 DIAGNOSIS — E11.42 TYPE 2 DIABETES MELLITUS WITH DIABETIC POLYNEUROPATHY, WITH LONG-TERM CURRENT USE OF INSULIN (HCC): Primary | ICD-10-CM

## 2025-04-15 DIAGNOSIS — Z79.4 TYPE 2 DIABETES MELLITUS WITH DIABETIC POLYNEUROPATHY, WITH LONG-TERM CURRENT USE OF INSULIN (HCC): Primary | ICD-10-CM

## 2025-04-15 DIAGNOSIS — Z79.4 TYPE 2 DIABETES MELLITUS WITH DIABETIC POLYNEUROPATHY, WITH LONG-TERM CURRENT USE OF INSULIN (HCC): ICD-10-CM

## 2025-04-16 RX ORDER — TIRZEPATIDE 7.5 MG/.5ML
7.5 INJECTION, SOLUTION SUBCUTANEOUS WEEKLY
Qty: 2 ML | Refills: 0 | Status: SHIPPED | OUTPATIENT
Start: 2025-04-16

## 2025-04-16 RX ORDER — DULOXETIN HYDROCHLORIDE 60 MG/1
60 CAPSULE, DELAYED RELEASE ORAL DAILY
Qty: 90 CAPSULE | Refills: 1 | Status: SHIPPED | OUTPATIENT
Start: 2025-04-16

## 2025-04-16 RX ORDER — GLIPIZIDE 10 MG/1
20 TABLET, FILM COATED, EXTENDED RELEASE ORAL DAILY
Qty: 180 TABLET | Refills: 1 | Status: SHIPPED | OUTPATIENT
Start: 2025-04-16

## 2025-04-17 RX ORDER — INSULIN GLARGINE 300 U/ML
48 INJECTION, SOLUTION SUBCUTANEOUS
OUTPATIENT
Start: 2025-04-17

## 2025-04-17 RX ORDER — INSULIN GLARGINE 100 [IU]/ML
50 INJECTION, SOLUTION SUBCUTANEOUS
Qty: 30 ML | Refills: 1 | Status: SHIPPED | OUTPATIENT
Start: 2025-04-17

## 2025-05-05 ENCOUNTER — PATIENT MESSAGE (OUTPATIENT)
Age: 70
End: 2025-05-05

## 2025-05-05 DIAGNOSIS — M17.0 PRIMARY OSTEOARTHRITIS OF BOTH KNEES: Primary | ICD-10-CM

## 2025-05-07 RX ORDER — CELECOXIB 200 MG/1
200 CAPSULE ORAL 2 TIMES DAILY
Qty: 60 CAPSULE | Refills: 0 | Status: SHIPPED | OUTPATIENT
Start: 2025-05-07

## 2025-05-10 DIAGNOSIS — E11.42 TYPE 2 DIABETES MELLITUS WITH DIABETIC POLYNEUROPATHY, WITH LONG-TERM CURRENT USE OF INSULIN (HCC): ICD-10-CM

## 2025-05-10 DIAGNOSIS — I10 PRIMARY HYPERTENSION: ICD-10-CM

## 2025-05-10 DIAGNOSIS — Z79.4 TYPE 2 DIABETES MELLITUS WITH DIABETIC POLYNEUROPATHY, WITH LONG-TERM CURRENT USE OF INSULIN (HCC): ICD-10-CM

## 2025-05-10 RX ORDER — TIRZEPATIDE 7.5 MG/.5ML
7.5 INJECTION, SOLUTION SUBCUTANEOUS WEEKLY
Qty: 2 ML | Refills: 0 | Status: CANCELLED | OUTPATIENT
Start: 2025-05-10

## 2025-05-11 RX ORDER — LISINOPRIL 5 MG/1
5 TABLET ORAL DAILY
Qty: 90 TABLET | Refills: 1 | Status: SHIPPED | OUTPATIENT
Start: 2025-05-11

## 2025-05-12 RX ORDER — TIRZEPATIDE 10 MG/.5ML
10 INJECTION, SOLUTION SUBCUTANEOUS WEEKLY
Qty: 2 ML | Refills: 0 | Status: SHIPPED | OUTPATIENT
Start: 2025-05-12

## 2025-06-03 DIAGNOSIS — M17.0 PRIMARY OSTEOARTHRITIS OF BOTH KNEES: ICD-10-CM

## 2025-06-04 RX ORDER — CELECOXIB 200 MG/1
200 CAPSULE ORAL 2 TIMES DAILY
Qty: 60 CAPSULE | Refills: 0 | Status: SHIPPED | OUTPATIENT
Start: 2025-06-04

## 2025-06-11 DIAGNOSIS — Z79.4 TYPE 2 DIABETES MELLITUS WITH DIABETIC POLYNEUROPATHY, WITH LONG-TERM CURRENT USE OF INSULIN (HCC): ICD-10-CM

## 2025-06-11 DIAGNOSIS — E11.42 TYPE 2 DIABETES MELLITUS WITH DIABETIC POLYNEUROPATHY, WITH LONG-TERM CURRENT USE OF INSULIN (HCC): ICD-10-CM

## 2025-06-12 RX ORDER — TIRZEPATIDE 10 MG/.5ML
INJECTION, SOLUTION SUBCUTANEOUS
Qty: 2 ML | Refills: 0 | Status: SHIPPED | OUTPATIENT
Start: 2025-06-12

## 2025-06-22 DIAGNOSIS — E78.2 MIXED HYPERLIPIDEMIA: ICD-10-CM

## 2025-06-22 RX ORDER — ATORVASTATIN CALCIUM 40 MG/1
40 TABLET, FILM COATED ORAL DAILY
Qty: 90 TABLET | Refills: 1 | Status: SHIPPED | OUTPATIENT
Start: 2025-06-22

## 2025-06-30 DIAGNOSIS — M17.0 PRIMARY OSTEOARTHRITIS OF BOTH KNEES: ICD-10-CM

## 2025-07-01 RX ORDER — CELECOXIB 200 MG/1
200 CAPSULE ORAL 2 TIMES DAILY
Qty: 60 CAPSULE | Refills: 5 | Status: SHIPPED | OUTPATIENT
Start: 2025-07-01

## 2025-07-08 ENCOUNTER — OFFICE VISIT (OUTPATIENT)
Age: 70
End: 2025-07-08
Payer: MEDICARE

## 2025-07-08 VITALS
SYSTOLIC BLOOD PRESSURE: 142 MMHG | HEIGHT: 62 IN | RESPIRATION RATE: 16 BRPM | DIASTOLIC BLOOD PRESSURE: 80 MMHG | TEMPERATURE: 98.3 F | OXYGEN SATURATION: 98 % | BODY MASS INDEX: 53.04 KG/M2 | HEART RATE: 80 BPM

## 2025-07-08 DIAGNOSIS — B02.9 HERPES ZOSTER WITHOUT COMPLICATION: Primary | ICD-10-CM

## 2025-07-08 DIAGNOSIS — E11.42 TYPE 2 DIABETES MELLITUS WITH DIABETIC POLYNEUROPATHY, WITH LONG-TERM CURRENT USE OF INSULIN (HCC): ICD-10-CM

## 2025-07-08 DIAGNOSIS — Z79.4 TYPE 2 DIABETES MELLITUS WITH DIABETIC POLYNEUROPATHY, WITH LONG-TERM CURRENT USE OF INSULIN (HCC): ICD-10-CM

## 2025-07-08 PROCEDURE — 99214 OFFICE O/P EST MOD 30 MIN: CPT

## 2025-07-08 RX ORDER — TIRZEPATIDE 10 MG/.5ML
10 INJECTION, SOLUTION SUBCUTANEOUS WEEKLY
Qty: 2 ML | Refills: 0 | Status: SHIPPED | OUTPATIENT
Start: 2025-07-08 | End: 2025-07-11

## 2025-07-08 RX ORDER — VALACYCLOVIR HYDROCHLORIDE 1 G/1
1000 TABLET, FILM COATED ORAL 3 TIMES DAILY
Qty: 21 TABLET | Refills: 0 | Status: SHIPPED | OUTPATIENT
Start: 2025-07-08 | End: 2025-07-15

## 2025-07-08 NOTE — PROGRESS NOTES
"Name: Hattie Patton      : 1955      MRN: [id number]  Encounter Provider: Aliza Davis PA-C  Encounter Date: 2025   Encounter department: [facility]IMARY [facility] [place]  :  Assessment & Plan  Herpes zoster without complication  Given information about shingles including avoiding pregnant and unvaccinated people (has nieces who are not vaccinated).  She already went up on her gabapentin on her own, so she was advised to continue this until the pain starts to subside.  Given samples of lidocaine patches 4% and she can use these as well.  Discussed postherpetic neuralgia and the need to increase gabapentin at that time.  She should follow up if 600 mg three times a day still does not control the pain.  Advised to obtain Shingles vaccine a few months after rash resolves.    Start Valttrex 1,000 mg 3 times a day for 7 days.  Orders:  •  valACYclovir (VALTREX) 1,000 mg tablet; Take 1 tablet (1,000 mg total) by mouth 3 (three) times a day for 7 days    Type 2 diabetes mellitus with diabetic polyneuropathy, with long-term current use of insulin (HCC)  She needs refll of her Mounjaro.  Has AWV scheduled in two weeks with her PCP.  Lab Results   Component Value Date    HGBA1C 7.8 (H) 2025       Orders:  •  Tirzepatide (Mounjaro) 10 MG/0.5ML SOAJ; Inject 10 mg under the skin once a week           History of Present Illness   HPI    Pt is here for suspected shingles.  Pain started about a week ago.  Rash started a day ago and has becomes blistered.  She already increased her gabapentin to 600 mg three times a day due to the pain, which is helping.    Review of Systems   Constitutional:  Negative for fever.   Cardiovascular:  Positive for chest pain (superficial).   Skin:  Positive for rash (painful, left chest).       Objective   /80 (BP Location: Left arm, Patient Position: Sitting, Cuff Size: Large)   Pulse 80   Temp 98.3 °F (36.8 °C) (Tympanic)   Resp 16   Ht 5' 2\" (1.575 m)   " SpO2 98%   BMI 53.04 kg/m²      Physical Exam  Constitutional:       General: She is not in acute distress.     Appearance: Normal appearance.     Cardiovascular:      Rate and Rhythm: Normal rate.   Pulmonary:      Effort: Pulmonary effort is normal. No respiratory distress.   Chest:        Comments: Blistering rash from left axilla to mid clavicular line along the T2-3 dermatome.  No crusting formation yet.  Tender to touch.  Abdominal:      Palpations: Abdomen is soft.      Tenderness: There is no abdominal tenderness.     Musculoskeletal:         General: Normal range of motion.     Skin:     General: Skin is warm and dry.      Findings: Rash present. Rash is vesicular.     Neurological:      Mental Status: She is alert and oriented to person, place, and time.     Psychiatric:         Mood and Affect: Mood normal.

## 2025-07-08 NOTE — ASSESSMENT & PLAN NOTE
She needs refll of her Mounjaro.  Has AWV scheduled in two weeks with her PCP.  Lab Results   Component Value Date    HGBA1C 7.8 (H) 01/16/2025       Orders:  •  Tirzepatide (Mounjaro) 10 MG/0.5ML SOAJ; Inject 10 mg under the skin once a week

## 2025-07-09 DIAGNOSIS — E11.42 TYPE 2 DIABETES MELLITUS WITH DIABETIC POLYNEUROPATHY, WITH LONG-TERM CURRENT USE OF INSULIN (HCC): ICD-10-CM

## 2025-07-09 DIAGNOSIS — Z79.4 TYPE 2 DIABETES MELLITUS WITH DIABETIC POLYNEUROPATHY, WITH LONG-TERM CURRENT USE OF INSULIN (HCC): ICD-10-CM

## 2025-07-09 DIAGNOSIS — M47.816 LUMBAR FACET ARTHROPATHY: ICD-10-CM

## 2025-07-10 ENCOUNTER — PATIENT MESSAGE (OUTPATIENT)
Age: 70
End: 2025-07-10

## 2025-07-10 DIAGNOSIS — E11.42 TYPE 2 DIABETES MELLITUS WITH DIABETIC POLYNEUROPATHY, WITH LONG-TERM CURRENT USE OF INSULIN (HCC): Primary | ICD-10-CM

## 2025-07-10 DIAGNOSIS — Z79.4 TYPE 2 DIABETES MELLITUS WITH DIABETIC POLYNEUROPATHY, WITH LONG-TERM CURRENT USE OF INSULIN (HCC): Primary | ICD-10-CM

## 2025-07-10 RX ORDER — GABAPENTIN 300 MG/1
600 CAPSULE ORAL DAILY
Qty: 180 CAPSULE | Refills: 1 | Status: SHIPPED | OUTPATIENT
Start: 2025-07-10

## 2025-07-11 RX ORDER — TIRZEPATIDE 12.5 MG/.5ML
12.5 INJECTION, SOLUTION SUBCUTANEOUS WEEKLY
Qty: 2 ML | Refills: 0 | Status: SHIPPED | OUTPATIENT
Start: 2025-07-11

## 2025-07-16 ENCOUNTER — RA CDI HCC (OUTPATIENT)
Dept: OTHER | Facility: HOSPITAL | Age: 70
End: 2025-07-16

## 2025-07-23 ENCOUNTER — OFFICE VISIT (OUTPATIENT)
Age: 70
End: 2025-07-23
Payer: MEDICARE

## 2025-07-23 VITALS
RESPIRATION RATE: 18 BRPM | OXYGEN SATURATION: 97 % | BODY MASS INDEX: 51.71 KG/M2 | HEIGHT: 62 IN | HEART RATE: 66 BPM | WEIGHT: 281 LBS | SYSTOLIC BLOOD PRESSURE: 128 MMHG | TEMPERATURE: 98.2 F | DIASTOLIC BLOOD PRESSURE: 76 MMHG

## 2025-07-23 DIAGNOSIS — B02.9 HERPES ZOSTER WITHOUT COMPLICATION: ICD-10-CM

## 2025-07-23 DIAGNOSIS — E78.2 MIXED HYPERLIPIDEMIA: ICD-10-CM

## 2025-07-23 DIAGNOSIS — E11.42 TYPE 2 DIABETES MELLITUS WITH DIABETIC POLYNEUROPATHY, WITH LONG-TERM CURRENT USE OF INSULIN (HCC): Primary | ICD-10-CM

## 2025-07-23 DIAGNOSIS — Z79.4 TYPE 2 DIABETES MELLITUS WITH DIABETIC POLYNEUROPATHY, WITH LONG-TERM CURRENT USE OF INSULIN (HCC): Primary | ICD-10-CM

## 2025-07-23 LAB — SL AMB POCT HEMOGLOBIN AIC: 6.3 (ref ?–6.5)

## 2025-07-23 PROCEDURE — 99214 OFFICE O/P EST MOD 30 MIN: CPT | Performed by: FAMILY MEDICINE

## 2025-07-23 PROCEDURE — 83036 HEMOGLOBIN GLYCOSYLATED A1C: CPT | Performed by: FAMILY MEDICINE

## 2025-07-23 PROCEDURE — G2211 COMPLEX E/M VISIT ADD ON: HCPCS | Performed by: FAMILY MEDICINE

## 2025-07-23 NOTE — ASSESSMENT & PLAN NOTE
Well-controlled with tirzepatide and glipizide 10 mg daily.  Patient ACE and statin.  Will continue to monitor  Lab Results   Component Value Date    HGBA1C 6.3 07/23/2025       Orders:    POCT hemoglobin A1c    Albumin / creatinine urine ratio; Future    Comprehensive metabolic panel; Future    Hemoglobin A1C; Future

## 2025-07-23 NOTE — PROGRESS NOTES
"Name: Hattie Patton      : 1955      MRN: [id number]  Encounter Provider: Linda Patton DO  Encounter Date: 2025   Encounter department: [facility] [place]  :  Assessment & Plan  Type 2 diabetes mellitus with diabetic polyneuropathy, with long-term current use of insulin (HCC)  Well-controlled with tirzepatide and glipizide 10 mg daily.  Patient ACE and statin.  Will continue to monitor  Lab Results   Component Value Date    HGBA1C 6.3 2025       Orders:    POCT hemoglobin A1c    Albumin / creatinine urine ratio; Future    Comprehensive metabolic panel; Future    Hemoglobin A1C; Future    Mixed hyperlipidemia  Stable on atorvastatin 5 mg daily  Orders:    Lipid Panel with Direct LDL reflex; Future    Herpes zoster without complication  Resolving               History of Present Illness   Patient presents for follow-up.  Discussed diabetes.  Discussed recent shingles infection.  Lesions have resolved.  Mild irritation at site continues.          Objective   /76 (BP Location: Left arm, Patient Position: Sitting, Cuff Size: Large)   Pulse 66   Temp 98.2 °F (36.8 °C) (Tympanic)   Resp 18   Ht 5' 2\" (1.575 m)   Wt 127 kg (281 lb)   SpO2 97%   BMI 51.40 kg/m²      Physical Exam  Constitutional:       Appearance: She is obese.   HENT:      Head: Normocephalic.      Right Ear: External ear normal.      Left Ear: External ear normal.     Eyes:      Conjunctiva/sclera: Conjunctivae normal.       Cardiovascular:      Rate and Rhythm: Normal rate and regular rhythm.      Pulses: Pulses are weak.           Dorsalis pedis pulses are 1+ on the right side and 1+ on the left side.   Pulmonary:      Effort: Pulmonary effort is normal.      Breath sounds: Normal breath sounds.   Abdominal:      Palpations: Abdomen is soft.     Musculoskeletal:      Right lower leg: Edema present.      Left lower leg: Edema present.   Feet:      Right foot:      Skin integrity: Dry skin " present. No ulcer, skin breakdown, erythema, warmth or callus.      Left foot:      Skin integrity: Dry skin present. No ulcer, skin breakdown, erythema, warmth or callus.     Neurological:      Mental Status: She is alert and oriented to person, place, and time.      Gait: Gait abnormal.     Psychiatric:         Attention and Perception: Attention normal.         Mood and Affect: Mood is depressed.         Behavior: Behavior is cooperative.         Thought Content: Thought content does not include suicidal ideation.         Diabetic Foot Exam    Patient's shoes and socks removed.    Right Foot/Ankle   Right Foot Inspection  Skin Exam: skin normal, skin intact and dry skin. No warmth, no callus, no erythema, no maceration, no abnormal color, no pre-ulcer, no ulcer and no callus.     Toe Exam: ROM and strength within normal limits and swelling.     Sensory   Monofilament testing: intact    Vascular  Capillary refills: elevated  The right DP pulse is 1+.     Left Foot/Ankle  Left Foot Inspection  Skin Exam: skin normal, skin intact and dry skin. No warmth, no erythema, no maceration, normal color, no pre-ulcer, no ulcer and no callus.     Toe Exam: ROM and strength within normal limits and swelling.     Sensory   Monofilament testing: intact    Vascular  Capillary refills: elevated  The left DP pulse is 1+.     Assign Risk Category  Deformity present  Loss of protective sensation  Weak pulses  Risk: 2

## 2025-07-26 DIAGNOSIS — F41.9 ANXIETY AND DEPRESSION: ICD-10-CM

## 2025-07-26 DIAGNOSIS — F32.A ANXIETY AND DEPRESSION: ICD-10-CM

## 2025-07-28 RX ORDER — BUSPIRONE HYDROCHLORIDE 5 MG/1
5 TABLET ORAL 2 TIMES DAILY
Qty: 60 TABLET | Refills: 0 | Status: SHIPPED | OUTPATIENT
Start: 2025-07-28

## 2025-08-01 DIAGNOSIS — J30.2 SEASONAL ALLERGIES: ICD-10-CM

## 2025-08-01 RX ORDER — FLUTICASONE PROPIONATE 50 MCG
SPRAY, SUSPENSION (ML) NASAL
Qty: 48 ML | Refills: 2 | Status: SHIPPED | OUTPATIENT
Start: 2025-08-01